# Patient Record
Sex: FEMALE | Race: WHITE | NOT HISPANIC OR LATINO | Employment: UNEMPLOYED | URBAN - METROPOLITAN AREA
[De-identification: names, ages, dates, MRNs, and addresses within clinical notes are randomized per-mention and may not be internally consistent; named-entity substitution may affect disease eponyms.]

---

## 2017-06-19 ENCOUNTER — ALLSCRIPTS OFFICE VISIT (OUTPATIENT)
Dept: OTHER | Facility: OTHER | Age: 27
End: 2017-06-19

## 2017-07-19 ENCOUNTER — ALLSCRIPTS OFFICE VISIT (OUTPATIENT)
Dept: OTHER | Facility: OTHER | Age: 27
End: 2017-07-19

## 2017-07-19 LAB
BILIRUB UR QL STRIP: NEGATIVE
CLARITY UR: NORMAL
COLOR UR: YELLOW
GLUCOSE (HISTORICAL): NORMAL
HGB UR QL STRIP.AUTO: NEGATIVE
KETONES UR STRIP-MCNC: NEGATIVE MG/DL
LEUKOCYTE ESTERASE UR QL STRIP: NEGATIVE
NITRITE UR QL STRIP: NEGATIVE
PH UR STRIP.AUTO: 7 [PH]
PROT UR STRIP-MCNC: NEGATIVE MG/DL
SP GR UR STRIP.AUTO: 1.01
UROBILINOGEN UR QL STRIP.AUTO: NORMAL

## 2018-01-11 NOTE — PROGRESS NOTES
Chief Complaint  Patient is here today to have a tetanus shot, L  Peoples/LPN      Active Problems    1  Adult body mass index 26 0-26 9 (V85 22) (Z68 26)   2  Allergic rhinitis (477 9) (J30 9)   3  Anxiety (300 00) (F41 9)   4  Back pain (724 5) (M54 9)   5  Chronic bilateral thoracic back pain (724 1,338 29) (M54 6,G89 29)   6  History of supraventricular tachycardia (V12 59) (Z86 79)   7  Need for diphtheria-tetanus-pertussis (Tdap) vaccine, adult/adolescent (V06 1) (Z23)   8  Palpitations (785 1) (R00 2)   9  Raynauds syndrome (443 0) (I73 00)    Current Meds   1  Meloxicam 15 MG Oral Tablet; take 1 tablet by mouth once daily; Therapy: 58DSK0517 to (Evaluate:23Nov2016); Last Rx:03Kaa5578 Ordered   2  Metoprolol Succinate ER 25 MG Oral Tablet Extended Release 24 Hour; TAKE 1 TABLET   ONCE DAILY; Therapy: 78YKC8008 to (Evaluate:22Jun2016); Last Rx:56Iva7327 Ordered   3  Oxycodone-Acetaminophen 5-325 MG Oral Tablet; TAKE 1 TABLET 4 times daily PRN for   severe pain; Therapy: 29PZP2264 to (Complete:31Jul2016); Last Rx:09Cje1961 Ordered   4  ValACYclovir HCl - 500 MG Oral Tablet; TAKE 1 TABLET TWICE DAILY; Therapy: 63ULM8301 to (Charanjit Abreu)  Requested for: 23Feb2016; Last   Rx:90Ese5781 Ordered   5  Vaniqa 13 9 % External Cream; use as directed; Therapy: 42KUQ2538 to (Evaluate:24Mar2016); Last Rx:86Hyh2907 Ordered    Allergies    1   PredniSONE TABS    Signatures   Electronically signed by : Mahad Carey MD; Jul 28 2016  4:57PM EST                       (Author)

## 2018-01-12 NOTE — RESULT NOTES
Message   PLEASE CALL   REVIEVED BW   BLOOD COUNT, LIVER FUNCTION, KIDNEY FUNCTION, THYROID CHECK WERE ALL NORMAL   CHOL WAS GOOD  [facility]        Verified Results  (1) COMPREHENSIVE METABOLIC PANEL 52CDW3933 00:50RQ [facility]     Test Name Result Flag Reference   GLUCOSE 92 mg/dL  65-99   Fasting reference interval   UREA NITROGEN (BUN) 12 mg/dL  7-25   CREATININE 0 69 mg/dL  0 50-1 10   eGFR NON-AFR  AMERICAN 123 mL/min/1 73m2  > OR = 60   eGFR AFRICAN AMERICAN 142 mL/min/1 73m2  > OR = 60   BUN/CREATININE RATIO   1-13   NOT APPLICABLE (calc)   SODIUM 139 mmol/L  135-146   POTASSIUM 3 8 mmol/L  3 5-5 3   CHLORIDE 106 mmol/L     CARBON DIOXIDE 21 mmol/L  19-30   CALCIUM 9 3 mg/dL  8 6-10 2   PROTEIN, TOTAL 7 1 g/dL  6 1-8 1   ALBUMIN 4 2 g/dL  3 6-5 1   GLOBULIN 2 9 g/dL (calc)  1 9-3 7   ALBUMIN/GLOBULIN RATIO 1 4 (calc)  1 0-2 5   BILIRUBIN, TOTAL 0 4 mg/dL  0 2-1 2   ALKALINE PHOSPHATASE 72 U/L     AST 14 U/L  10-30   ALT 9 U/L  6-29     (1) LIPID PANEL, FASTING 90Trf0470 12:00AM [facility]     Test Name Result Flag Reference   CHOLESTEROL, TOTAL 158 mg/dL  125-200   HDL CHOLESTEROL 43 mg/dL L > OR = 46   TRIGLICERIDES 441 mg/dL  <150   LDL-CHOLESTEROL 94 mg/dL (calc)  <130   Desirable range <100 mg/dL for patients with CHD or  diabetes and <70 mg/dL for diabetic patients with  known heart disease  CHOL/HDLC RATIO 3 7 (calc)  < OR = 5 0   NON HDL CHOLESTEROL 115 mg/dL (calc)     Target for non-HDL cholesterol is 30 mg/dL higher than   LDL cholesterol target       (Q) SED RATE BY MODIFIED Sagar Hays 67PHR2022 12:00AM [facility]     Test Name Result Flag Reference   SED RATE BY MODIFIEDJORDANA 6 mm/h  < OR = 20     (Q) CBC (H/H, RBC, INDICES, WBC, PLT) 39CGX1936 12:00AM [facility]     Test Name Result Flag Reference   WHITE BLOOD CELL COUNT 6 0 Thousand/uL  3 8-10 8   RED BLOOD CELL COUNT 5 19 Million/uL H 3 80-5 10   HEMOGLOBIN 13 3 g/dL  11 7-15 5   HEMATOCRIT 42 6 % 35 0-45 0   MCV 82 2 fL  80 0-100 0   MCH 25 7 pg L 27 0-33 0   MCHC 31 3 g/dL L 32 0-36 0   RDW 14 9 %  11 0-15 0   PLATELET COUNT 480 Thousand/uL  140-400   MPV 10 8 fL  7 5-11 5     *(Q) VITAMIN D, 25-HYDROXY, LC/MS/MS 76Sfo9922 12:00AM [facility]     Test Name Result Flag Reference   VITAMIN D, 25-OH, TOTAL 27 ng/mL L    Vitamin D Status         25-OH Vitamin D:     Deficiency:                    <20 ng/mL  Insufficiency:             20 - 29 ng/mL  Optimal:                 > or = 30 ng/mL     For 25-OH Vitamin D testing on patients on   D2-supplementation and patients for whom quantitation   of D2 and D3 fractions is required, the QuestAssureD(TM)  25-OH VIT D, (D2,D3), LC/MS/MS is recommended: order   code 71928 (patients >2yrs)  For more information on this test, go to:  http://[employer]/faq/YQS194  (This link is being provided for   informational/educational purposes only )     (1) C-REACTIVE PROTEIN 62Kjr8624 12:00AM [email or web address]     Test Name Result Flag Reference   C-REACTIVE PROTEIN <0 10 mg/dL  <0 80   Please be advised that patients taking Carboxypenicillins  may exhibit falsely decreased C-Reactive Protein levels  due to an analytical interference in this assay  (Q) TSH, 3RD GENERATION 94Fyi7274 12:00AM [employer]     Test Name Result Flag Reference   TSH 1 11 mIU/L     Reference Range                         > or = 20 Years  0 40-4 50                              Pregnancy Ranges            First trimester    0 26-2 66            Second trimester   0 55-2 73            Third trimester    0 43-2 91     (1) KIMMY SCREEN, (INC  PATTERN IF INDICATED) 22DXJ1750 12:00AM [email or web address]     Test Name Result Flag Reference   ANACHOICE(R) SCREEN NEGATIVE  NEGATIVE   A negative ANAchoice(R) indicates the absence of  detectable antibodies to component analytes  consisting of dsDNA, Chromatin, RNP, Sm/RNP, Sm, SSA,   SSB, Ly-1, Centromere B, Scl-70 and Ribosomal P       A negative ANAchoice(R) should be interpreted in the  context of the clinical and laboratory findings, and  does not rule out autoimmune disease characterized by   other autoantibody specificities including autoimmune   hepatitis and primary biliary cirrhosis

## 2018-01-13 NOTE — PROGRESS NOTES
Assessment   1  Encounter for preventive health examination (V70 0) (Z00 00)  2  Allergic rhinitis (477 9) (J30 9)  3  Raynauds syndrome (443 0) (I73 00)  4  Anxiety (300 00) (F41 9)  5  Palpitations (785 1) (R00 2)  6  History of supraventricular tachycardia (V12 59) (Z86 79)  7  Adult body mass index 26 0-26 9 (V85 22) (Z68 26)1      1 Amended By: Kay Torres; Feb 23 2016 1:26 PM EST    Plan  Health Maintenance    · (1) CBC/ PLT (NO DIFF); Status:Active; Requested for:97Yxd6303;    · (1) COMPREHENSIVE METABOLIC PANEL; Status:Active; Requested for:51Sjw7099;    · (1) LIPID PANEL, FASTING; Status:Active; Requested for:00Pgq8160;    · (1) TSH; Status:Active; Requested for:32Evr7243;    · (1) VITAMIN D 25-HYDROXY; Status:Active; Requested for:86Owb0156;    · EKG/ECG- POC; Status:Active; Requested for:03Kjp5773;    · Urine Dip Automated- POC; Status:Resulted - Requires Verification;   Done: 00VON7284  08:44AM   · Follow-up visit in 1 year Evaluation and Treatment  Follow-up  Status: Hold For -  Scheduling  Requested for: 22Feb2016   · Always use a seat belt and shoulder strap when riding or driving a motor vehicle ;  Status:Complete;   Done: 48RTA8329   · Begin a limited exercise program ; Status:Complete;   Done: 68ZBT2235   · Drink plenty of fluids ; Status:Complete;   Done: 59TDV5435   · Eat a low fat and low cholesterol diet ; Status:Complete;   Done: 13QIE3653   · Use a sun block product with an SPF of 15 or more ; Status:Complete;   Done:  54AKX8183   · We encourage all of our patients to exercise regularly    30 minutes of exercise or physical  activity five or more days a week is recommended for children and adults ;  Status:Complete;   Done: 18QCP6748   · We recommend routine visits to a dentist ; Status:Complete;   Done: 27DWH0357   · We recommend that you bring your body mass index down to 26 ; Status:Complete;    Done: 77AJI9787  History of supraventricular tachycardia, Palpitations    · Start: Metoprolol Succinate ER 25 MG Oral Tablet Extended Release 24 Hour; TAKE 1  TABLET ONCE DAILY   · Cardiology Referral Other Physician Referral  Consult  Status: Hold For - Scheduling   Requested for: 02EQG0370  YOU are Referring to a non- Preferred Provider : Established Patient  (MU) Care Summary provided  : Yes  PMH: History of herpes labialis    · Renew: ValACYclovir HCl - 500 MG Oral Tablet; TAKE 1 TABLET TWICE DAILY2   Raynauds syndrome    · (1) KIMMY SCREEN, (INC  PATTERN IF INDICATED); Status:Active; Requested  for:49Znz6453; 1    · (1) C-REACTIVE PROTEIN; Status:Active; Requested for:45Tyv1362; 1    · (1) SED RATE; Status:Active; Requested for:59Ouy7402; 1      1 Amended By: Rach Connolly; Feb 23 2016 9:08 AM EST   2 Amended By: Rach Connolly; Feb 23 2016 9:08 AM EST    Discussion/Summary  health maintenance visit Currently, she eats a healthy diet  cervical cancer screening is current Breast cancer screening: self breast exam technique was taught  Colorectal cancer screening: colorectal cancer screening is not indicated  Osteoporosis screening: bone mineral density testing is not indicated  Screening lab work includes hemoglobin, glucose, lipid profile, thyroid function testing, 25-hydroxyvitamin D and urinalysis  The immunizations are up to date  Advice and education were given regarding sunscreen use and seat belt use  Patient discussion: discussed with the patient  DISCUSSED HEALTH MAINTENANCE ISSUES  BW WILL BE OBTAINED  CARDIOLOGY CONSULT ORDERED  CONSIDER TONIC WATER WITH QUININE - 4 OZ BEFORE BED FOR RESTLESS LEGS  REVISIT IN 1 YR FOR ANNUAL EXAM       Chief Complaint  Patient is here today for a complete physical exam; no PAP  lb/lpn      History of Present Illness  HM, Adult Female: The patient is being seen for a health maintenance evaluation  General Health: The patient's health since the last visit is described as good  She has regular dental visits  She denies vision problems   She denies hearing loss  Immunizations status: up to date  Lifestyle:  She consumes a diverse and healthy diet  She does not have any weight concerns  She does not exercise regularly  She does not use tobacco  She denies alcohol use  Screening: cancer screening reviewed and current  metabolic screening reviewed and current  risk screening reviewed and current  HPI: [address] [place] [address] Rd RECORD  NO MAJOR CONCERNS  RESTLESS LEGS  PALPITATIONS      Review of Systems    Constitutional: no fever, no chills and not feeling tired  Eyes: no eyesight problems  ENT: no sore throat and no nasal discharge  Cardiovascular: palpitations, but no chest pain, the heart rate was not fast and no lower extremity edema  Respiratory: no shortness of breath, no cough, no wheezing and no shortness of breath during exertion  Gastrointestinal: no abdominal pain, no nausea, no vomiting, no diarrhea and no blood in stools  Genitourinary: no dysuria, no incontinence and no unexplained vaginal bleeding  Musculoskeletal: no arthralgias, no joint swelling, no myalgias and no joint stiffness  Integumentary: no rashes  Neurological: no headache, no numbness, no tingling and no dizziness  Psychiatric: no anxiety and no depression  Endocrine: no muscle weakness  Hematologic/Lymphatic: no swollen glands in the neck  Active Problems   1  Allergic rhinitis (477 9) (J30 9)  2   Anxiety (300 00) (F41 9)  3  Raynauds syndrome (443 0) (I73 00)    Past Medical History    · History of Abdominal pain (789 00) (R10 9)   · History of Abscess or cellulitis of face (682 0) (L03 211,L02 01)   · History of Backache (724 5) (M54 9)   · History of Bunion of great toe of right foot (727 1) (M20 11)   · History of Contact dermatitis due to plants, except food (692 6) (L25 5)   · History of Disorder of female genital organs (629 9) (N94 9)   · History of Flatulence, eructation and gas pain (787 3) (R14 3,R14 1,R14 2) · History of acne (V13 3) (Z87 2)   · History of atopic dermatitis (V13 3) (Z87 2)   · History of chronic sinusitis (V12 69) (Z87 09)   · History of constipation (V12 79) (Z87 19)   · History of fatigue (V13 89) (N34 278)   · History of headache (V13 89) (T55 259)   · History of herpes labialis (V12 09) (Z86 19)   · History of pregnancy (V13 29)   · History of urinary incontinence (V13 09) (T74 736)   · History of Kidney stone (592 0) (N20 0)   · History of Lower leg pain (729 5) (M79 669)   · History of Myalgia and myositis (729 1) (M79 1,M60 9)   · History of Other ovarian cyst (620 2) (N83 29)   · History of Otitis externa (380 10) (H60 90)   · History of Peptic ulcer (533 90) (K27 9)   · History of PPD screening test (V74 1) (Z11 1)   · History of Pyuria (791 9) (N39 0)   · History of Restless legs (333 94) (G25 81)   · History of Seen in hospital outpatient department   · History of Thoracic back pain (724 1) (M54 6)   · History of Weight gain (783 1) (R63 5)    Surgical History    · History of  Section   · History of Dental Surgery   · History of Sinus Surgery    Family History    · Family history of hyperlipidemia (V18 19) (Z83 49)   · Family history of hypertension (V17 49) (Z82 49)    Social History    · Caffeine use (V4 89) (F15 90)   · Cultural background   · NON-   · Never a smoker   · Occasional alcohol use   · Patient ingests cola containing caffeine (V49 89) (Z78 9)   · Primary spoken language English   · Tea    Current Meds  1  1   2  Metoprolol Succinate ER 25 MG Oral Tablet Extended Release 24 Hour; TAKE 1 TABLET   ONCE DAILY; Therapy: 10ISF0222 to (Evaluate:2016); Last Rx:68Trw4646 Ordered1   3  ValACYclovir HCl - 500 MG Oral Tablet; TAKE 1 TABLET TWICE DAILY;    Therapy: 64TCR3713 to ([facility])  Requested for:1  84VXW1332;2  Last Rx:10Djh1759 Ordered1      1 Amended By: Prisca Sanders; 2016 9:08 AM EST   2 Amended By: Prisca Sanders; 2016 9:09 AM EST    Allergies   1  PredniSONE TABS    Vitals   Recorded: 84Jex3067 08:23AM   Temperature 97 2 F, Tympanic    Heart Rate 76, L Radial    Pulse Quality Normal, L Radial    Respiration 16    Respiration Quality Normal    Systolic 568, LUE, Sitting    Diastolic 82, LUE, Sitting    Height 5 ft 4 5 in    Weight 159 lb     BMI Calculated 26 87    BSA Calculated 1 78    Patient Refused Height No No   Patient Refused Weight No No   LMP 61ERI9615      Physical Exam    Constitutional   General appearance: No acute distress, well appearing and well nourished  Head and Face   Head and face: Normal     Palpation of the face and sinuses: No sinus tenderness  Eyes   Conjunctiva and lids: No swelling, erythema or discharge  Pupils and irises: Equal, round, reactive to light  Ophthalmoscopic examination: Normal fundi and optic discs  Ears, Nose, Mouth, and Throat   External inspection of ears and nose: Normal     Otoscopic examination: Tympanic membranes translucent with normal light reflex  Canals patent without erythema  Nasal mucosa, septum, and turbinates: Normal without edema or erythema  Lips, teeth, and gums: Normal, good dentition  Oropharynx: Normal with no erythema, edema, exudate or lesions  Neck   Neck: Supple, symmetric, trachea midline, no masses  Thyroid: Normal, no thyromegaly  Pulmonary   Respiratory effort: No increased work of breathing or signs of respiratory distress  Auscultation of lungs: Clear to auscultation  Cardiovascular   Auscultation of heart: Normal rate and rhythm, normal S1 and S2, no murmurs  Carotid pulses: 2+ bilaterally  Abdominal aorta: Normal     Femoral pulses: 2+ bilaterally  Pedal pulses: 2+ bilaterally  Peripheral vascular exam: Normal     Examination of extremities for edema and/or varicosities: Normal     Chest   Palpation of breasts and axillae: Normal, no masses palpated  Abdomen   Abdomen: Non-tender, no masses      Liver and spleen: No hepatomegaly or splenomegaly  Examination for hernias: No hernia appreciated  Lymphatic   Palpation of lymph nodes in neck: No lymphadenopathy  Palpation of lymph nodes in axillae: No lymphadenopathy  Musculoskeletal   Gait and station: Normal     Digits and nails: Normal without clubbing or cyanosis  Joints, bones, and muscles: Normal     Range of motion: Normal     Stability: Normal     Muscle strength/tone: Normal     Skin   Skin and subcutaneous tissue: Normal without rashes or lesions  Palpation of skin and subcutaneous tissue: Normal turgor  Neurologic   Cranial nerves: Cranial nerves II-XII intact  Cortical function: Normal mental status  Reflexes: 2+ and symmetric  Sensation: No sensory loss  Coordination: Normal finger to nose and heel to shin      Psychiatric   Judgment and insight: Normal     Orientation to person, place, and time: Normal     Mood and affect: Normal        Results/Data  Urine Dip Automated- POC 82LLO5075 08:44AM Yuri George     Test Name Result Flag Reference   Color Yellow     Clarity Transparent     Leukocytes Negative     Nitrite Negative     Blood Negative     Bilirubin Negative     Urobilinogen Normal     Protein Negative     Ph 5 0     Specific Gravity 1 030     Ketone Negative     Glucose Normal       PHQ-9 Adult Depression Screening 31Sse1490 08:21AM User, [facility]     Test Name Result Flag Reference   PHQ-9 Adult Depression Score 5     Q1: 0, Q2: 1, Q3: 0, Q4: 3, Q5: 0, Q6: 1, Q7: 0, Q8: 0, Q9: 0   PHQ-9 Adult Depression Screening Negative     PHQ-9 Difficulty Level Somewhat difficult     PHQ-9 Severity Mild Depression       Falls Risk Assessment (Dx V80 09 Screen for Neurologic Disorder) 33ETG2700 08:19AM User, [facility]     Test Name Result Flag Reference   Falls Risk      No falls in the past year       Signatures   Electronically signed by : Melissa Davis MD; Feb 23 2016  1:26PM EST                       (Author)

## 2018-01-14 VITALS
HEIGHT: 65 IN | DIASTOLIC BLOOD PRESSURE: 70 MMHG | OXYGEN SATURATION: 99 % | WEIGHT: 170 LBS | SYSTOLIC BLOOD PRESSURE: 110 MMHG | BODY MASS INDEX: 28.32 KG/M2 | TEMPERATURE: 98.3 F | RESPIRATION RATE: 16 BRPM | HEART RATE: 94 BPM

## 2018-01-14 VITALS
OXYGEN SATURATION: 99 % | TEMPERATURE: 98.4 F | HEART RATE: 84 BPM | SYSTOLIC BLOOD PRESSURE: 118 MMHG | HEIGHT: 65 IN | RESPIRATION RATE: 16 BRPM | DIASTOLIC BLOOD PRESSURE: 70 MMHG | BODY MASS INDEX: 27.99 KG/M2 | WEIGHT: 168 LBS

## 2018-01-17 NOTE — RESULT NOTES
Message   PLEASE CALL   BACK X RAY WAS OK   SOME MILD SCOLIOSIS     Verified Results  XR SPINE THORACIC 2 VIEW 13Wte5827 11:01AM Jaz [place] Order Number: [id number]     Test Name Result Flag Reference   XR SPINE THORACIC 2 VW (Report)     THORACIC SPINE     INDICATION: Back pain  COMPARISON: None     VIEWS: AP and lateral projections; 3 images     FINDINGS:     Thoracic vertebrae demonstrate normal stature  A mild scoliosis of the thoracic spine is present convexity to the right  There is no fracture or pathologic bone lesion  There is no displacement of the paraspinal line  The pedicles are intact  IMPRESSION:     Mild scoliosis of the thoracic spine convexity to the right         Workstation performed: [id number]     Signed by:   Karthik Devine MD   7/28/16

## 2018-01-18 NOTE — PROGRESS NOTES
Assessment    1  Encounter for preventive health examination (V70 0) (Z00 00)   2  Raynauds syndrome (443 0) (I73 00)   3  Chronic bilateral thoracic back pain (724 1,338 29) (M54 6,G89 29)   4  Allergic rhinitis (477 9) (J30 9)   5  History of supraventricular tachycardia (V12 59) (Z86 79)    Plan  Allergic rhinitis, Chronic bilateral thoracic back pain, Health Maintenance, History of  supraventricular tachycardia, Raynauds syndrome    · Urine Dip Automated- POC; Status:Active - Perform Order; Requested for:[id number]; Health Maintenance    · Follow-up visit in 1 year Evaluation and Treatment  Follow-up  Status: Hold For -  Scheduling  Requested for: 76SQN1815   · Always use a seat belt and shoulder strap when riding or driving a motor vehicle ;  Status:Complete;   Done: 44CNF3113   · Begin a limited exercise program ; Status:Complete;   Done: 73KXU4573   · Drink plenty of fluids ; Status:Complete;   Done: 77NRA9381   · Eat a low fat and low cholesterol diet ; Status:Complete;   Done: 28PSY9794   · Eat a normal well-balanced diet ; Status:Complete;   Done: 65FYF9290   · Use a sun block product with an SPF of 15 or more ; Status:Complete;   Done: 12FQJ5387   · We encourage all of our patients to exercise regularly  30 minutes of exercise or physical  activity five or more days a week is recommended for children and adults ;  Status:Complete;   Done: 79GQD7828   · We recommend routine visits to a dentist ; Status:Complete;   Done: 98SHM4243   · We recommend that you bring your body mass index down to 26 ; Status:Complete;    Done: 68THS1984    Discussion/Summary  health maintenance visit Currently, she eats a healthy diet  cervical cancer screening is current Breast cancer screening: self breast exam technique was taught  Colorectal cancer screening: colorectal cancer screening is not indicated  Osteoporosis screening: bone mineral density testing is not indicated  The immunizations are up to date   Advice and education were given regarding sunscreen use and seat belt use  Patient discussion: discussed with the patient  DISCUSSED HEALTH MAINTENANCE ISSUES  BW WILL BE OBTAINED AFTER PREGNANCY  REVISIT IN 1 YR FOR ANNUAL EXAM    The treatment plan was reviewed with the patient/guardian  The patient/guardian understands and agrees with the treatment plan      Chief Complaint  Patient is here today for her annual physical exam, L  Peoples/VOLODYMYR      History of Present Illness  HM, Adult Female: The patient is being seen for a health maintenance evaluation  General Health: The patient's health since the last visit is described as good  She has regular dental visits  She denies vision problems  She denies hearing loss  Immunizations status: up to date  Lifestyle:  She consumes a diverse and healthy diet  She does not have any weight concerns  She does not exercise regularly  She does not use tobacco  She denies alcohol use  Screening: cancer screening reviewed and current  metabolic screening reviewed and current  risk screening reviewed and current  HPI: [address] [place] [address] Rd RECORD  NO MAJOR CONCERNS       Review of Systems    Constitutional: no fever, no chills and not feeling tired  Eyes: no eyesight problems  ENT: no sore throat and no nasal discharge  Cardiovascular: no chest pain, the heart rate was not fast, no palpitations and no lower extremity edema  Respiratory: no shortness of breath, no cough, no wheezing and no shortness of breath during exertion  Gastrointestinal: no abdominal pain, no nausea, no vomiting, no diarrhea and no blood in stools  Genitourinary: no dysuria, no incontinence and no unexplained vaginal bleeding  Musculoskeletal: no arthralgias, no joint swelling, no myalgias and no joint stiffness  Integumentary: no rashes  Neurological: no headache, no numbness, no tingling and no dizziness  Psychiatric: no anxiety and no depression     Endocrine: no muscle weakness  Hematologic/Lymphatic: no swollen glands in the neck  ROS reviewed  Active Problems    1  Adult body mass index 26 0-26 9 (V85 22) (Z68 26)   2  Allergic rhinitis (477 9) (J30 9)   3  Anxiety (300 00) (F41 9)   4  Chronic bilateral thoracic back pain (724 1,338 29) (M54 6,G89 29)   5  History of supraventricular tachycardia (V12 59) (Z86 79)   6  Raynauds syndrome (443 0) (I73 00)    Past Medical History    · History of Bunion of great toe of right foot (727 1) (M21 611)   · History of atopic dermatitis (V13 3) (Z87 2)   · History of chronic sinusitis (V12 69) (Z87 09)   · History of herpes labialis (V12 09) (Z86 19)   · History of pregnancy (V13 29)   · History of Kidney stone (592 0) (N20 0)   · History of Other ovarian cyst (620 2) (N83 299)   · History of Peptic ulcer (533 90) (K27 9)   · History of Thoracic back pain (724 1) (M54 6)    Surgical History    · History of  Section   · History of Dental Surgery   · History of Sinus Surgery    Family History  Father    · Family history of hyperlipidemia (V18 19) (Z83 49)   · Family history of hypertension (V17 49) (Z82 49)  Family History    · Denied: FH: mental illness    Social History    · Caffeine use (V49 89) (F15 90)   · Cultural background   · NON-   · Dental care, regularly   · Never a smoker   · Occasional alcohol use   · Patient ingests cola containing caffeine (V49 89) (Z78 9)   · Primary spoken language English   · Tea    Current Meds   1  No Reported Medications Recorded    Allergies    1  PredniSONE TABS   2   Vicodin TABS    Vitals   Recorded: 88QYB3416 01:15PM   Temperature 98 3 F, Temporal   Heart Rate 94, R Radial   Pulse Quality Normal, R Radial   Respiration Quality Normal   Respiration 16   Systolic 060, LUE, Sitting   Diastolic 70, LUE, Sitting   Height 5 ft 5 in   Weight 170 lb    BMI Calculated 28 29   BSA Calculated 1 85   O2 Saturation 99     Physical Exam    Constitutional   General appearance: No acute distress, well appearing and well nourished  Head and Face   Head and face: Normal     Eyes   Conjunctiva and lids: No swelling, erythema or discharge  Pupils and irises: Equal, round, reactive to light  Ophthalmoscopic examination: Normal fundi and optic discs  Ears, Nose, Mouth, and Throat   External inspection of ears and nose: Normal     Otoscopic examination: Tympanic membranes translucent with normal light reflex  Canals patent without erythema  Nasal mucosa, septum, and turbinates: Normal without edema or erythema  Lips, teeth, and gums: Normal, good dentition  Oropharynx: Normal with no erythema, edema, exudate or lesions  Neck   Neck: Supple, symmetric, trachea midline, no masses  Thyroid: Normal, no thyromegaly  Pulmonary   Respiratory effort: No increased work of breathing or signs of respiratory distress  Auscultation of lungs: Clear to auscultation  Cardiovascular   Auscultation of heart: Normal rate and rhythm, normal S1 and S2, no murmurs  Carotid pulses: 2+ bilaterally  Abdominal aorta: Normal     Femoral pulses: 2+ bilaterally  Pedal pulses: 2+ bilaterally  Peripheral vascular exam: Normal     Examination of extremities for edema and/or varicosities: Normal     Abdomen   Abdomen: Non-tender, no masses  IUP AT 28 WEEKS  Liver and spleen: No hepatomegaly or splenomegaly  Examination for hernias: No hernia appreciated  Lymphatic   Palpation of lymph nodes in neck: No lymphadenopathy  Musculoskeletal   Gait and station: Normal     Digits and nails: Normal without clubbing or cyanosis  Joints, bones, and muscles: Normal     Range of motion: Normal     Stability: Normal     Muscle strength/tone: Normal     Skin   Skin and subcutaneous tissue: Normal without rashes or lesions  Palpation of skin and subcutaneous tissue: Normal turgor  Neurologic   Cranial nerves: Cranial nerves II-XII intact      Cortical function: Normal mental status  Reflexes: 2+ and symmetric  Sensation: No sensory loss  Coordination: Normal finger to nose and heel to shin      Psychiatric   Judgment and insight: Normal     Orientation to person, place, and time: Normal     Mood and affect: Normal        Signatures   Electronically signed by : Madonna Ignacio MD; Jul 19 2017  1:43PM EST                       (Author)

## 2018-02-02 ENCOUNTER — OFFICE VISIT (OUTPATIENT)
Dept: FAMILY MEDICINE CLINIC | Facility: CLINIC | Age: 28
End: 2018-02-02
Payer: COMMERCIAL

## 2018-02-02 VITALS
TEMPERATURE: 99.6 F | OXYGEN SATURATION: 99 % | BODY MASS INDEX: 24.92 KG/M2 | WEIGHT: 146 LBS | HEART RATE: 72 BPM | DIASTOLIC BLOOD PRESSURE: 70 MMHG | HEIGHT: 64 IN | SYSTOLIC BLOOD PRESSURE: 122 MMHG | RESPIRATION RATE: 16 BRPM

## 2018-02-02 DIAGNOSIS — G89.29 CHRONIC BILATERAL THORACIC BACK PAIN: Primary | ICD-10-CM

## 2018-02-02 DIAGNOSIS — M54.6 CHRONIC BILATERAL THORACIC BACK PAIN: Primary | ICD-10-CM

## 2018-02-02 DIAGNOSIS — F41.9 ANXIETY: ICD-10-CM

## 2018-02-02 DIAGNOSIS — L98.9 FACIAL SKIN LESION: ICD-10-CM

## 2018-02-02 PROCEDURE — 99214 OFFICE O/P EST MOD 30 MIN: CPT | Performed by: FAMILY MEDICINE

## 2018-02-02 RX ORDER — OXYCODONE HYDROCHLORIDE AND ACETAMINOPHEN 5; 325 MG/1; MG/1
1 TABLET ORAL EVERY 4 HOURS PRN
COMMUNITY
End: 2018-05-21

## 2018-02-02 RX ORDER — METAXALONE 800 MG/1
800 TABLET ORAL 3 TIMES DAILY
Qty: 90 TABLET | Refills: 1 | Status: SHIPPED | OUTPATIENT
Start: 2018-02-02 | End: 2019-07-01 | Stop reason: ALTCHOICE

## 2018-02-02 NOTE — PATIENT INSTRUCTIONS
REST  WARM SHOWERS  BACK STRETCHING EXERCISES  TRIAL OF SKELAXIN  CALL IN 1 MONTH WITH UPDATE, SOONER PRN    RV IF SHE WOULD LIKE SKIN LESION REMOVED    MAY NEED MRI AND ORTHO CONSULT FOR FURTHER EVAL

## 2018-02-02 NOTE — ASSESSMENT & PLAN NOTE
SMALL AREA ON HER UPPER LIP  PRESENT FOR MONTHS  NOT IMPROVING  DENIES ANY PAIN OR ITCH  NO BLEEDING

## 2018-02-02 NOTE — ASSESSMENT & PLAN NOTE
HAS BEEN ANXIOUS LATELY  NO REAL STRESSORS  SLEEPING OK EXCEPT FOR THE PAST SEVERAL NIGHTS  DISCUSSED OPTIONS FOR CARE

## 2018-02-02 NOTE — PROGRESS NOTES
Assessment/Plan:    Anxiety  HAS BEEN ANXIOUS LATELY  NO REAL STRESSORS  SLEEPING OK EXCEPT FOR THE PAST SEVERAL NIGHTS  DISCUSSED OPTIONS FOR CARE    Chronic bilateral thoracic back pain  BACK CONTINUES TO GIVE ISSUES  SEEMS TO FLARE UP  UPPER BACK  RADIATES TO THE FRONT  DENIES ANY NUMBNESS OR TINGLING  OCC FEELS NAUSEOUS    REVIEWED PREVIOUS X RAY RESULTS - MILD SCOLIOSIS  NO RECENT OR PREV TRAUMA  NO CHANGE IN STOOLS  DENIES ANY URINARY FREQ OR BURNING    Facial skin lesion  SMALL AREA ON HER UPPER LIP  PRESENT FOR MONTHS  NOT IMPROVING  DENIES ANY PAIN OR ITCH  NO BLEEDING       Diagnoses and all orders for this visit:    Chronic bilateral thoracic back pain  -     metaxalone (SKELAXIN) 800 mg tablet; Take 1 tablet (800 mg total) by mouth 3 (three) times a day    Anxiety    Facial skin lesion    Other orders  -     oxyCODONE-acetaminophen (PERCOCET) 5-325 mg per tablet; Take 1 tablet by mouth every 4 (four) hours as needed for moderate pain          Subjective:      Patient ID: Kelly Alex is a 24 y o  female  HPI    The following portions of the patient's history were reviewed and updated as appropriate: allergies, current medications, past family history, past medical history, past social history, past surgical history and problem list     Review of Systems   Constitutional: Negative for chills, fatigue and fever  HENT: Negative for congestion, ear discharge, ear pain, mouth sores, postnasal drip, sore throat and trouble swallowing  Eyes: Negative for pain, discharge and visual disturbance  Respiratory: Negative for cough, shortness of breath and wheezing  Cardiovascular: Negative for chest pain, palpitations and leg swelling  Gastrointestinal: Positive for nausea  Negative for abdominal distention, abdominal pain, blood in stool, diarrhea and vomiting  Endocrine: Negative for polydipsia, polyphagia and polyuria  Genitourinary: Negative for dysuria, frequency, hematuria and urgency  Musculoskeletal: Positive for arthralgias, back pain and neck stiffness  Negative for gait problem and joint swelling  Skin: Negative for pallor and rash  SKIN LESION ON UPPER LIP   Neurological: Negative for dizziness, syncope, speech difficulty, weakness, light-headedness, numbness and headaches  Hematological: Negative for adenopathy  Psychiatric/Behavioral: Negative for behavioral problems, confusion and sleep disturbance  The patient is not nervous/anxious  Objective:     Physical Exam   Constitutional: She is oriented to person, place, and time  She appears well-developed and well-nourished  HENT:   Head: Normocephalic and atraumatic  Right Ear: External ear normal    Left Ear: External ear normal    Nose: Nose normal    Mouth/Throat: Oropharynx is clear and moist    Eyes: Conjunctivae and EOM are normal  Pupils are equal, round, and reactive to light  Right eye exhibits no discharge  Left eye exhibits no discharge  Neck: Normal range of motion  Neck supple  No thyromegaly present  Cardiovascular: Normal rate, regular rhythm, normal heart sounds and intact distal pulses  No murmur heard  Pulmonary/Chest: Effort normal and breath sounds normal  She has no wheezes  She has no rales  Abdominal: Soft  Bowel sounds are normal  She exhibits no distension and no mass  There is no tenderness  There is no rebound and no guarding  Genitourinary: No breast swelling, tenderness or discharge  Musculoskeletal: Normal range of motion  She exhibits tenderness  She exhibits no edema or deformity  MILD TENDERNESS UPPER THORACIC SPINE, MINIMAL PARAVERTEBRAL TENDERNESS   Lymphadenopathy:     She has no cervical adenopathy  Neurological: She is alert and oriented to person, place, and time  No cranial nerve deficit  She exhibits normal muscle tone  Coordination normal    Skin: Skin is warm and dry  No rash noted  No erythema     SMALL PAPULAR LESION UPPER R LIP  APPROX 2 MM IN DIAMETER  NO SCALE OR BLEEDING   Psychiatric: She has a normal mood and affect  Her behavior is normal  Judgment and thought content normal    Vitals reviewed

## 2018-02-02 NOTE — ASSESSMENT & PLAN NOTE
BACK CONTINUES TO GIVE ISSUES  SEEMS TO FLARE UP  UPPER BACK  RADIATES TO THE FRONT  DENIES ANY NUMBNESS OR TINGLING  OCC FEELS NAUSEOUS    REVIEWED PREVIOUS X RAY RESULTS - MILD SCOLIOSIS  NO RECENT OR PREV TRAUMA  NO CHANGE IN STOOLS  DENIES ANY URINARY FREQ OR BURNING

## 2018-02-13 ENCOUNTER — TELEPHONE (OUTPATIENT)
Dept: FAMILY MEDICINE CLINIC | Facility: CLINIC | Age: 28
End: 2018-02-13

## 2018-03-12 ENCOUNTER — OFFICE VISIT (OUTPATIENT)
Dept: FAMILY MEDICINE CLINIC | Facility: CLINIC | Age: 28
End: 2018-03-12
Payer: COMMERCIAL

## 2018-03-12 VITALS
WEIGHT: 149 LBS | OXYGEN SATURATION: 99 % | HEART RATE: 64 BPM | DIASTOLIC BLOOD PRESSURE: 68 MMHG | RESPIRATION RATE: 16 BRPM | HEIGHT: 65 IN | SYSTOLIC BLOOD PRESSURE: 104 MMHG | BODY MASS INDEX: 24.83 KG/M2 | TEMPERATURE: 99 F

## 2018-03-12 DIAGNOSIS — M54.6 CHRONIC BILATERAL THORACIC BACK PAIN: Primary | ICD-10-CM

## 2018-03-12 DIAGNOSIS — F41.9 ANXIETY: ICD-10-CM

## 2018-03-12 DIAGNOSIS — L98.9 FACIAL SKIN LESION: ICD-10-CM

## 2018-03-12 DIAGNOSIS — G89.29 CHRONIC BILATERAL THORACIC BACK PAIN: Primary | ICD-10-CM

## 2018-03-12 LAB
SL AMB  POCT GLUCOSE, UA: NORMAL
SL AMB LEUKOCYTE ESTERASE,UA: 0
SL AMB POCT BILIRUBIN,UA: 0
SL AMB POCT BLOOD,UA: 250
SL AMB POCT CLARITY,UA: CLEAR
SL AMB POCT COLOR,UA: YELLOW
SL AMB POCT KETONES,UA: 0
SL AMB POCT NITRITE,UA: 0
SL AMB POCT PH,UA: 6.5
SL AMB POCT SPECIFIC GRAVITY,UA: 1.03
SL AMB POCT URINE PROTEIN: 0
SL AMB POCT UROBILINOGEN: NORMAL

## 2018-03-12 PROCEDURE — 99213 OFFICE O/P EST LOW 20 MIN: CPT | Performed by: FAMILY MEDICINE

## 2018-03-12 PROCEDURE — 87086 URINE CULTURE/COLONY COUNT: CPT | Performed by: FAMILY MEDICINE

## 2018-03-12 PROCEDURE — 81003 URINALYSIS AUTO W/O SCOPE: CPT | Performed by: FAMILY MEDICINE

## 2018-03-12 NOTE — PROGRESS NOTES
Assessment/Plan:    Facial skin lesion  NO REAL CHANGES  STABLE    Anxiety  IMPROVED      Chronic bilateral thoracic back pain  BACK PAIN IS IMPROVED  SKELAXIN SEEMS TO BE HELPING WELL    ENCOURAGE PATIENT TO CONTINUE BACK EXERCISES  PATIENT HAPPY WITH PROGRESS AT THIS TIME       Diagnoses and all orders for this visit:    Chronic bilateral thoracic back pain    Facial skin lesion    Anxiety          Subjective:      Patient ID: Randi Ahuja is a 24 y o  female      HPI    The following portions of the patient's history were reviewed and updated as appropriate: allergies, current medications, past family history, past medical history, past social history, past surgical history and problem list     Review of Systems      Objective:      /68 (BP Location: Left arm, Patient Position: Sitting, Cuff Size: Standard)   Pulse 64   Temp 99 °F (37 2 °C) (Tympanic)   Resp 16   Ht 5' 5" (1 651 m)   Wt 67 6 kg (149 lb)   SpO2 99%   BMI 24 79 kg/m²          Physical Exam      NO PE WAS PREFORMED  LENGTH OF VISIT - 20 MIN  LENGTH OF  - 20 MIN

## 2018-03-12 NOTE — ASSESSMENT & PLAN NOTE
BACK PAIN IS IMPROVED  SKELAXIN SEEMS TO BE HELPING WELL    ENCOURAGE PATIENT TO CONTINUE BACK EXERCISES  PATIENT HAPPY WITH PROGRESS AT THIS TIME

## 2018-03-14 ENCOUNTER — TELEPHONE (OUTPATIENT)
Dept: FAMILY MEDICINE CLINIC | Facility: CLINIC | Age: 28
End: 2018-03-14

## 2018-03-14 NOTE — TELEPHONE ENCOUNTER
Please call Mercedes back from [facility] re the order that was placed for Skippy Clamp      [phone]  Thanks

## 2018-03-15 LAB — BACTERIA UR CULT: NORMAL

## 2018-05-21 ENCOUNTER — OFFICE VISIT (OUTPATIENT)
Dept: FAMILY MEDICINE CLINIC | Facility: CLINIC | Age: 28
End: 2018-05-21
Payer: COMMERCIAL

## 2018-05-21 VITALS
RESPIRATION RATE: 16 BRPM | SYSTOLIC BLOOD PRESSURE: 108 MMHG | HEART RATE: 78 BPM | HEIGHT: 65 IN | TEMPERATURE: 98.7 F | WEIGHT: 147 LBS | DIASTOLIC BLOOD PRESSURE: 70 MMHG | OXYGEN SATURATION: 99 % | BODY MASS INDEX: 24.49 KG/M2

## 2018-05-21 DIAGNOSIS — F41.9 ANXIETY: ICD-10-CM

## 2018-05-21 DIAGNOSIS — R51.9 ACUTE NONINTRACTABLE HEADACHE, UNSPECIFIED HEADACHE TYPE: Primary | ICD-10-CM

## 2018-05-21 PROCEDURE — 99213 OFFICE O/P EST LOW 20 MIN: CPT | Performed by: FAMILY MEDICINE

## 2018-05-21 RX ORDER — NAPROXEN 500 MG/1
500 TABLET ORAL 2 TIMES DAILY WITH MEALS
Qty: 42 TABLET | Refills: 1 | Status: SHIPPED | OUTPATIENT
Start: 2018-05-21 | End: 2018-12-11

## 2018-05-21 NOTE — PROGRESS NOTES
Assessment/Plan:    Problem List Items Addressed This Visit     Anxiety    Relevant Medications    sertraline (ZOLOFT) 50 mg tablet    Acute nonintractable headache - Primary    Relevant Medications    naproxen (EC NAPROSYN) 500 MG EC tablet          Patient Instructions   PLENTY OF FLUIDS  SYMPTOM DIARY  MEDICATIONS AS DIRECTED  RV 2 WEEKS, SOONER PRN  MAY NEED TO CONSIDER BW THEN      Return in about 2 weeks (around 6/4/2018) for Recheck  Subjective:      Patient ID: Wilfrid Lindquist is a 24 y o  female  Chief Complaint   Patient presents with    Headache    Fatigue       PATIENT RETURNS  COMPLAINS OF NOT FEELING WELL  HAD COLD LIKE SYMPTOMS 2 WEEKS AGO  NOW FEELS TIRED  OCC NAUSEOUS  HEADACHES OFF AND ON THROUGHOUT THE DAY  DENIES ANY FEVER OR CHILLS  NO VD  NO RASHES    HAS BEEN FEELING SOMEWHAT ANXIOUS AND BLUE  HAPPENED AFTER HER LAST PREGNANCY ALSO      Fatigue   Associated symptoms include fatigue, headaches and nausea  Pertinent negatives include no abdominal pain, arthralgias, chest pain, congestion, fever, joint swelling, numbness, sore throat or vomiting  The following portions of the patient's history were reviewed and updated as appropriate: allergies, current medications, past family history, past medical history, past social history, past surgical history and problem list     Review of Systems   Constitutional: Positive for fatigue  Negative for fever  HENT: Negative for congestion and sore throat  Eyes: Negative for discharge  Respiratory: Negative for chest tightness  Cardiovascular: Negative for chest pain and palpitations  Gastrointestinal: Positive for nausea  Negative for abdominal pain, diarrhea and vomiting  Musculoskeletal: Negative for arthralgias and joint swelling  Neurological: Positive for headaches  Negative for numbness  Psychiatric/Behavioral: Positive for dysphoric mood  The patient is nervous/anxious            Current Outpatient Prescriptions   Medication Sig Dispense Refill    metaxalone (SKELAXIN) 800 mg tablet Take 1 tablet (800 mg total) by mouth 3 (three) times a day 90 tablet 1    naproxen (EC NAPROSYN) 500 MG EC tablet Take 1 tablet (500 mg total) by mouth 2 (two) times a day with meals 42 tablet 1    sertraline (ZOLOFT) 50 mg tablet Take 1 tablet (50 mg total) by mouth daily 30 tablet 1     No current facility-administered medications for this visit  Objective:    /70   Pulse 78   Temp 98 7 °F (37 1 °C) (Temporal)   Resp 16   Ht 5' 4 5" (1 638 m)   Wt 66 7 kg (147 lb)   SpO2 99%   BMI 24 84 kg/m²        Physical Exam   Constitutional: She is oriented to person, place, and time  She appears well-developed and well-nourished  HENT:   Head: Normocephalic and atraumatic  Eyes: Conjunctivae and EOM are normal  Pupils are equal, round, and reactive to light  Right eye exhibits no discharge  Left eye exhibits no discharge  FUNDI WNL   Neck: Normal range of motion  Neck supple  No thyromegaly present  Cardiovascular: Normal rate, regular rhythm and normal heart sounds  No murmur heard  Pulmonary/Chest: Effort normal and breath sounds normal  No respiratory distress  She has no wheezes  She has no rales  Abdominal: Soft  Bowel sounds are normal  There is no tenderness  Musculoskeletal: Normal range of motion  She exhibits no edema or tenderness  Lymphadenopathy:     She has no cervical adenopathy  Neurological: She is alert and oriented to person, place, and time  She has normal reflexes  She displays normal reflexes  No cranial nerve deficit  She exhibits normal muscle tone  Coordination normal    Skin: Skin is warm and dry  No rash noted  No erythema  Psychiatric: She has a normal mood and affect   Her behavior is normal  Judgment and thought content normal               Juni Silva MD

## 2018-05-21 NOTE — PATIENT INSTRUCTIONS
PLENTY OF FLUIDS  SYMPTOM DIARY  MEDICATIONS AS DIRECTED  RV 2 WEEKS, SOONER PRN  MAY NEED TO CONSIDER BW THEN

## 2018-06-08 DIAGNOSIS — F41.9 ANXIETY: Primary | ICD-10-CM

## 2018-06-08 RX ORDER — ALPRAZOLAM 0.25 MG/1
0.25 TABLET ORAL DAILY PRN
Qty: 30 TABLET | Refills: 0 | Status: SHIPPED | OUTPATIENT
Start: 2018-06-08 | End: 2018-12-07 | Stop reason: SDUPTHER

## 2018-06-28 ENCOUNTER — TELEPHONE (OUTPATIENT)
Dept: FAMILY MEDICINE CLINIC | Facility: CLINIC | Age: 28
End: 2018-06-28

## 2018-07-24 ENCOUNTER — OFFICE VISIT (OUTPATIENT)
Dept: FAMILY MEDICINE CLINIC | Facility: CLINIC | Age: 28
End: 2018-07-24
Payer: COMMERCIAL

## 2018-07-24 VITALS
RESPIRATION RATE: 16 BRPM | SYSTOLIC BLOOD PRESSURE: 118 MMHG | HEART RATE: 84 BPM | OXYGEN SATURATION: 99 % | WEIGHT: 151.8 LBS | BODY MASS INDEX: 25.29 KG/M2 | DIASTOLIC BLOOD PRESSURE: 70 MMHG | HEIGHT: 65 IN | TEMPERATURE: 98.9 F

## 2018-07-24 DIAGNOSIS — I73.00 RAYNAUD'S DISEASE WITHOUT GANGRENE: ICD-10-CM

## 2018-07-24 DIAGNOSIS — Z13.29 SCREENING FOR HYPOTHYROIDISM: ICD-10-CM

## 2018-07-24 DIAGNOSIS — J30.1 SEASONAL ALLERGIC RHINITIS DUE TO POLLEN: ICD-10-CM

## 2018-07-24 DIAGNOSIS — M54.6 CHRONIC BILATERAL THORACIC BACK PAIN: ICD-10-CM

## 2018-07-24 DIAGNOSIS — Z00.00 ROUTINE GENERAL MEDICAL EXAMINATION AT A HEALTH CARE FACILITY: Primary | ICD-10-CM

## 2018-07-24 DIAGNOSIS — G89.29 CHRONIC BILATERAL THORACIC BACK PAIN: ICD-10-CM

## 2018-07-24 DIAGNOSIS — Z13.6 SCREENING FOR HYPERTENSION: ICD-10-CM

## 2018-07-24 DIAGNOSIS — Z13.220 SCREENING FOR HYPERLIPIDEMIA: ICD-10-CM

## 2018-07-24 DIAGNOSIS — N92.6 MENSTRUAL ABNORMALITY: ICD-10-CM

## 2018-07-24 PROCEDURE — 36415 COLL VENOUS BLD VENIPUNCTURE: CPT | Performed by: FAMILY MEDICINE

## 2018-07-24 PROCEDURE — 93000 ELECTROCARDIOGRAM COMPLETE: CPT | Performed by: FAMILY MEDICINE

## 2018-07-24 PROCEDURE — 99395 PREV VISIT EST AGE 18-39: CPT | Performed by: FAMILY MEDICINE

## 2018-07-24 NOTE — PATIENT INSTRUCTIONS
DISCUSSED HEALTH ISSUES  HEALTHY DIET AND EXERCISE  BW WILL BE OBTAINED  RECOMMEND CALCIUM 9274-5147 MG DAILY  VITAMIN D3  1000 IU DAILY  RV IN 1 YEAR FOR ANNUAL EXAM, SOONER IF NEEDED

## 2018-07-24 NOTE — PROGRESS NOTES
FAMILY PRACTICE HEALTH MAINTENANCE OFFICE VISIT  [facility]    NAME: Carl Smith  AGE: 24 y o  SEX: female  : 1990     DATE: 2018    Assessment and Plan     Problem List Items Addressed This Visit        Respiratory    Allergic rhinitis       Cardiovascular and Mediastinum    Raynauds syndrome    Relevant Orders    Sedimentation rate, automated    C-reactive protein       Other    Chronic bilateral thoracic back pain    Routine general medical examination at a health care facility - Primary    Relevant Orders    POCT ECG    POCT urine dip auto non-scope    CBC and differential    Comprehensive metabolic panel    Lipid panel    TSH, 3rd generation with Free T4 reflex    Sedimentation rate, automated    C-reactive protein    FSH and LH    Screening for hypothyroidism    Relevant Orders    Lipid panel    TSH, 3rd generation with Free T4 reflex    Screening for hyperlipidemia    Relevant Orders    Lipid panel    TSH, 3rd generation with Free T4 reflex    Screening for hypertension    Relevant Orders    POCT ECG    POCT urine dip auto non-scope    Comprehensive metabolic panel      Other Visit Diagnoses     Menstrual abnormality        Relevant Orders    CBC and differential    TSH, 3rd generation with Free T4 reflex    FSH and LH               Return in about 1 year (around 2019) for Annual physical         Chief Complaint     Chief Complaint   Patient presents with    Physical Exam       History of Present Illness     Julia Gonzalez  NO CONCERNS AT THIS TIME          Well Adult Physical   Patient here for a comprehensive physical exam       Diet and Physical Activity  Diet: well balanced diet  Weight concerns: Patient is overweight (BMI 25 0-29  9)  Exercise: frequently      Depression Screen  PHQ-9 Depression Screening    PHQ-9:    Frequency of the following problems over the past two weeks:       Little interest or pleasure in doing things:  1 - several days  Feeling down, depressed, or hopeless:  1 - several days  PHQ-2 Score:  2          General Health  Hearing: Normal:  bilateral  Vision: no vision problems  Dental: regular dental visits    Reproductive Health          The following portions of the patient's history were reviewed and updated as appropriate: allergies, current medications, past family history, past medical history, past social history, past surgical history and problem list     Review of Systems     Review of Systems   Constitutional: Negative for chills, fatigue and fever  HENT: Negative for congestion, ear discharge, ear pain, mouth sores, postnasal drip, sore throat and trouble swallowing  Eyes: Negative for pain, discharge and visual disturbance  Respiratory: Negative for cough, shortness of breath and wheezing  Cardiovascular: Negative for chest pain, palpitations and leg swelling  Gastrointestinal: Negative for abdominal distention, abdominal pain, blood in stool, diarrhea and nausea  Endocrine: Negative for polydipsia, polyphagia and polyuria  Genitourinary: Negative for dysuria, frequency, hematuria and urgency  Musculoskeletal: Negative for arthralgias, gait problem and joint swelling  Skin: Negative for pallor and rash  Neurological: Negative for dizziness, syncope, speech difficulty, weakness, light-headedness, numbness and headaches  Hematological: Negative for adenopathy  Psychiatric/Behavioral: Negative for behavioral problems, confusion and sleep disturbance  The patient is not nervous/anxious          Past Medical History     Past Medical History:   Diagnosis Date    Atopic dermatitis     Resolved: 22Feb2016    Bunion of great toe of right foot     Last Assessed: 28Sep2015    Chronic sinusitis     Resolved: 22Feb2016    Herpes labialis     Resolved: 22Feb2016    Kidney stone     Ovarian cyst     Peptic ulcer        Past Surgical History     Past Surgical History:   Procedure Laterality Date     SECTION  2015     SECTION  10/20/2017    SINUS SURGERY      WISDOM TOOTH EXTRACTION         Social History     Social History     Social History    Marital status: /Civil Union     Spouse name: N/A    Number of children: N/A    Years of education: N/A     Social History Main Topics    Smoking status: Never Smoker    Smokeless tobacco: Never Used    Alcohol use Yes      Comment: occasional    Drug use: No    Sexual activity: Not Asked     Other Topics Concern    None     Social History Narrative    Caffeine use    Cultural backgroun: Non-    Dental care, regularly    Patient ingests cola containing caffeine    Primary spoken language English    Tea       Family History     Family History   Problem Relation Age of Onset    Hyperlipidemia Father     Hypertension Father     Asthma Mother     Substance Abuse Neg Hx     Mental illness Neg Hx        Current Medications       Current Outpatient Prescriptions:     ALPRAZolam (XANAX) 0 25 mg tablet, Take 1 tablet (0 25 mg total) by mouth daily as needed for anxiety, Disp: 30 tablet, Rfl: 0    metaxalone (SKELAXIN) 800 mg tablet, Take 1 tablet (800 mg total) by mouth 3 (three) times a day, Disp: 90 tablet, Rfl: 1    naproxen (EC NAPROSYN) 500 MG EC tablet, Take 1 tablet (500 mg total) by mouth 2 (two) times a day with meals, Disp: 42 tablet, Rfl: 1    sertraline (ZOLOFT) 50 mg tablet, Take 1 tablet (50 mg total) by mouth daily, Disp: 30 tablet, Rfl: 1     Allergies     Allergies   Allergen Reactions    Prednisone GI Intolerance    Tramadol Itching    Vicodin [Hydrocodone-Acetaminophen] Itching       Objective     /70 (BP Location: Left arm, Patient Position: Sitting, Cuff Size: Standard)   Pulse 84   Temp 98 9 °F (37 2 °C) (Temporal)   Resp 16   Ht 5' 4 5" (1 638 m)   Wt 68 9 kg (151 lb 12 8 oz)   SpO2 99%   BMI 25 65 kg/m²      Physical Exam   Constitutional: She is oriented to person, place, and time  She appears well-developed and well-nourished  HENT:   Head: Normocephalic and atraumatic  Right Ear: External ear normal    Left Ear: External ear normal    Nose: Nose normal    Mouth/Throat: Oropharynx is clear and moist    Eyes: Conjunctivae and EOM are normal  Pupils are equal, round, and reactive to light  Right eye exhibits no discharge  Left eye exhibits no discharge  Neck: Normal range of motion  Neck supple  No thyromegaly present  Cardiovascular: Normal rate, regular rhythm, normal heart sounds and intact distal pulses  No murmur heard  Pulmonary/Chest: Effort normal and breath sounds normal  She has no wheezes  She has no rales  Abdominal: Soft  Bowel sounds are normal  She exhibits no distension and no mass  There is no tenderness  There is no rebound and no guarding  Genitourinary: No breast swelling, tenderness or discharge  Musculoskeletal: Normal range of motion  She exhibits no edema, tenderness or deformity  Lymphadenopathy:     She has no cervical adenopathy  Neurological: She is alert and oriented to person, place, and time  She has normal reflexes  No cranial nerve deficit  She exhibits normal muscle tone  Coordination normal    Skin: Skin is warm and dry  No rash noted  No erythema  Psychiatric: She has a normal mood and affect  Her behavior is normal  Judgment and thought content normal    Vitals reviewed          No exam data present    Health Maintenance     Health Maintenance   Topic Date Due    Depression Screening PHQ-9  1990    INFLUENZA VACCINE  09/01/2018    HIV SCREENING  03/12/2021    DTaP,Tdap,and Td Vaccines (7 - Td) 07/28/2026     Immunization History   Administered Date(s) Administered    DTP 02/08/1993, 04/07/1993, 06/28/1993, 06/14/1994, 08/25/1998    Hib (PRP-OMP) 02/08/1993, 04/07/1993, 12/10/1993    IPV 02/08/1993, 04/07/1993, 06/14/1994, 08/25/1998    MMR 03/17/1994, 08/25/1998    Meningococcal, Unknown Serogroups 05/04/2010    Td (adult), adsorbed 06/21/2007    Tdap 07/28/2016    Tuberculin Skin Test 09/09/1993    Tuberculin Skin Test-PPD Intradermal 12/04/2014       Piter Sigala MD  [facility]

## 2018-07-26 LAB
ALBUMIN SERPL-MCNC: 4.5 G/DL (ref 3.6–5.1)
ALBUMIN/GLOB SERPL: 1.6 (CALC) (ref 1–2.5)
ALP SERPL-CCNC: 68 U/L (ref 33–115)
ALT SERPL-CCNC: 10 U/L (ref 6–29)
AST SERPL-CCNC: 17 U/L (ref 10–30)
BASOPHILS # BLD AUTO: 42 CELLS/UL (ref 0–200)
BASOPHILS NFR BLD AUTO: 0.8 %
BILIRUB SERPL-MCNC: 0.4 MG/DL (ref 0.2–1.2)
BUN SERPL-MCNC: 15 MG/DL (ref 7–25)
BUN/CREAT SERPL: ABNORMAL (CALC) (ref 6–22)
CALCIUM SERPL-MCNC: 9.6 MG/DL (ref 8.6–10.2)
CHLORIDE SERPL-SCNC: 101 MMOL/L (ref 98–110)
CHOLEST SERPL-MCNC: 186 MG/DL
CHOLEST/HDLC SERPL: 3.5 (CALC)
CO2 SERPL-SCNC: 19 MMOL/L (ref 20–31)
CREAT SERPL-MCNC: 0.75 MG/DL (ref 0.5–1.1)
CRP SERPL-MCNC: 1 MG/L
EOSINOPHIL # BLD AUTO: 80 CELLS/UL (ref 15–500)
EOSINOPHIL NFR BLD AUTO: 1.5 %
ERYTHROCYTE [DISTWIDTH] IN BLOOD BY AUTOMATED COUNT: 13.6 % (ref 11–15)
ERYTHROCYTE [SEDIMENTATION RATE] IN BLOOD BY WESTERGREN METHOD: 11 MM/H
FSH SERPL-ACNC: 4.5 MIU/ML
GLOBULIN SER CALC-MCNC: 2.9 G/DL (CALC) (ref 1.9–3.7)
GLUCOSE SERPL-MCNC: 88 MG/DL (ref 65–99)
HCT VFR BLD AUTO: 38 % (ref 35–45)
HDLC SERPL-MCNC: 53 MG/DL
HGB BLD-MCNC: 12 G/DL (ref 11.7–15.5)
LDLC SERPL CALC-MCNC: 113 MG/DL (CALC)
LH SERPL-ACNC: 6 MIU/ML
LYMPHOCYTES # BLD AUTO: 1844 CELLS/UL (ref 850–3900)
LYMPHOCYTES NFR BLD AUTO: 34.8 %
MCH RBC QN AUTO: 25.5 PG (ref 27–33)
MCHC RBC AUTO-ENTMCNC: 31.6 G/DL (ref 32–36)
MCV RBC AUTO: 80.9 FL (ref 80–100)
MONOCYTES # BLD AUTO: 360 CELLS/UL (ref 200–950)
MONOCYTES NFR BLD AUTO: 6.8 %
NEUTROPHILS # BLD AUTO: 2973 CELLS/UL (ref 1500–7800)
NEUTROPHILS NFR BLD AUTO: 56.1 %
NONHDLC SERPL-MCNC: 133 MG/DL (CALC)
PLATELET # BLD AUTO: 345 THOUSAND/UL (ref 140–400)
PMV BLD REES-ECKER: 12 FL (ref 7.5–12.5)
POTASSIUM SERPL-SCNC: 4 MMOL/L (ref 3.5–5.3)
PROT SERPL-MCNC: 7.4 G/DL (ref 6.1–8.1)
RBC # BLD AUTO: 4.7 MILLION/UL (ref 3.8–5.1)
SL AMB EGFR AFRICAN AMERICAN: 128 ML/MIN/1.73M2
SL AMB EGFR NON AFRICAN AMERICAN: 111 ML/MIN/1.73M2
SODIUM SERPL-SCNC: 139 MMOL/L (ref 135–146)
TRIGL SERPL-MCNC: 102 MG/DL
TSH SERPL-ACNC: 1.39 MIU/L
WBC # BLD AUTO: 5.3 THOUSAND/UL (ref 3.8–10.8)

## 2018-07-29 DIAGNOSIS — B37.49 CANDIDA INFECTION OF GENITAL REGION: Primary | ICD-10-CM

## 2018-07-29 RX ORDER — FLUCONAZOLE 150 MG/1
TABLET ORAL
Qty: 2 TABLET | Refills: 0 | Status: SHIPPED | OUTPATIENT
Start: 2018-07-29 | End: 2018-08-01

## 2018-08-07 DIAGNOSIS — F41.9 ANXIETY: Primary | ICD-10-CM

## 2018-08-07 RX ORDER — ESCITALOPRAM OXALATE 10 MG/1
10 TABLET ORAL DAILY
Qty: 30 TABLET | Refills: 3 | Status: SHIPPED | OUTPATIENT
Start: 2018-08-07 | End: 2018-12-11

## 2018-09-08 DIAGNOSIS — A09 DIARRHEA OF INFECTIOUS ORIGIN: Primary | ICD-10-CM

## 2018-10-22 ENCOUNTER — OFFICE VISIT (OUTPATIENT)
Dept: FAMILY MEDICINE CLINIC | Facility: CLINIC | Age: 28
End: 2018-10-22
Payer: COMMERCIAL

## 2018-10-22 VITALS
HEIGHT: 64 IN | OXYGEN SATURATION: 98 % | RESPIRATION RATE: 14 BRPM | TEMPERATURE: 98.4 F | WEIGHT: 154.02 LBS | HEART RATE: 86 BPM | SYSTOLIC BLOOD PRESSURE: 118 MMHG | BODY MASS INDEX: 26.29 KG/M2 | DIASTOLIC BLOOD PRESSURE: 84 MMHG

## 2018-10-22 DIAGNOSIS — Z23 NEED FOR INFLUENZA VACCINATION: ICD-10-CM

## 2018-10-22 DIAGNOSIS — N60.01 CYST OF RIGHT BREAST: ICD-10-CM

## 2018-10-22 DIAGNOSIS — M79.604 PAIN OF RIGHT LOWER EXTREMITY: Primary | ICD-10-CM

## 2018-10-22 PROBLEM — Z00.00 ROUTINE GENERAL MEDICAL EXAMINATION AT A HEALTH CARE FACILITY: Status: RESOLVED | Noted: 2018-07-24 | Resolved: 2018-10-22

## 2018-10-22 PROBLEM — Z13.6 SCREENING FOR HYPERTENSION: Status: RESOLVED | Noted: 2018-07-24 | Resolved: 2018-10-22

## 2018-10-22 PROBLEM — Z13.220 SCREENING FOR HYPERLIPIDEMIA: Status: RESOLVED | Noted: 2018-07-24 | Resolved: 2018-10-22

## 2018-10-22 PROBLEM — Z13.29 SCREENING FOR HYPOTHYROIDISM: Status: RESOLVED | Noted: 2018-07-24 | Resolved: 2018-10-22

## 2018-10-22 PROCEDURE — 90686 IIV4 VACC NO PRSV 0.5 ML IM: CPT

## 2018-10-22 PROCEDURE — 99213 OFFICE O/P EST LOW 20 MIN: CPT | Performed by: FAMILY MEDICINE

## 2018-10-22 PROCEDURE — 90471 IMMUNIZATION ADMIN: CPT

## 2018-10-22 PROCEDURE — 3008F BODY MASS INDEX DOCD: CPT | Performed by: FAMILY MEDICINE

## 2018-10-22 PROCEDURE — 96372 THER/PROPH/DIAG INJ SC/IM: CPT | Performed by: FAMILY MEDICINE

## 2018-10-22 RX ORDER — METHYLPREDNISOLONE ACETATE 40 MG/ML
40 INJECTION, SUSPENSION INTRA-ARTICULAR; INTRALESIONAL; INTRAMUSCULAR; SOFT TISSUE ONCE
Status: COMPLETED | OUTPATIENT
Start: 2018-10-22 | End: 2018-10-22

## 2018-10-22 RX ORDER — DICLOFENAC POTASSIUM 50 MG/1
TABLET, FILM COATED ORAL
Refills: 2 | COMMUNITY
Start: 2018-09-11 | End: 2019-07-01

## 2018-10-22 RX ORDER — NALTREXONE HYDROCHLORIDE 50 MG/1
TABLET, FILM COATED ORAL
Refills: 1 | COMMUNITY
Start: 2018-09-11 | End: 2018-12-11

## 2018-10-22 RX ADMIN — METHYLPREDNISOLONE ACETATE 40 MG: 40 INJECTION, SUSPENSION INTRA-ARTICULAR; INTRALESIONAL; INTRAMUSCULAR; SOFT TISSUE at 10:33

## 2018-10-22 NOTE — PROGRESS NOTES
Assessment/Plan:    Problem List Items Addressed This Visit        Other    Cyst of right breast    Relevant Orders    US breast right limited (diagnostic)    Pain of right lower extremity - Primary    Relevant Medications    methylPREDNISolone acetate (DEPO-MEDROL) injection 40 mg      Other Visit Diagnoses     Need for influenza vaccination        Relevant Orders    SYRINGE/SINGLE-DOSE VIAL: influenza vaccine, 9640-8167, quadrivalent, 0 5 mL, preservative-free, for patients 3+ yr (FLUZONE)          Patient Instructions   REST  WARM COMPRESS TO LEG  TRIAL OF DEPOMEDROL  MAY NEED FURTHER IMAGING IN NOT BETTER    R BREAST US FOR SMALL CYST    CALL IN 2-3 WEEKS WITH UPDATE, SOONER PRN          Return if symptoms worsen or fail to improve  Subjective:      Patient ID: Umair June is a 24 y o  female  Chief Complaint   Patient presents with    calf     Lt, x1 month ago    Flu Vaccine       CINTIA RETURNS FOR 2 ISSUES    APPROX 1 MONTH AGO  NOTED SENSATION IN HER L CALF    DEVELOPED PAIN - WENT TO THE ED  WORKUP INCLUDED US TO R/O DVT    PAIN HAD IMPROVED  SENSATION REMAINS  NOT POSITIONAL  COMES AND GOES    CONCERNED ABOUT SMALL LUMP IN R BREAST  NO NIPPLE DISCHARGE          The following portions of the patient's history were reviewed and updated as appropriate: allergies, current medications, past family history, past medical history, past social history, past surgical history and problem list     Review of Systems   Constitutional: Negative for fever  HENT: Negative for congestion and sore throat  Eyes: Negative for discharge  Respiratory: Negative for chest tightness  Cardiovascular: Negative for chest pain and palpitations  Gastrointestinal: Negative for abdominal pain, diarrhea, nausea and vomiting  Musculoskeletal: Negative for arthralgias and joint swelling  Neurological: Negative for numbness  Psychiatric/Behavioral: The patient is not nervous/anxious            Current Outpatient Prescriptions   Medication Sig Dispense Refill    ALPRAZolam (XANAX) 0 25 mg tablet Take 1 tablet (0 25 mg total) by mouth daily as needed for anxiety 30 tablet 0    diclofenac potassium (CATAFLAM) 50 mg tablet TK 1 T TID FOR UP TO 5 DAYS DURING MENSES  MAY TAKE 2 TS AS INITIAL DOSE  2    metaxalone (SKELAXIN) 800 mg tablet Take 1 tablet (800 mg total) by mouth 3 (three) times a day 90 tablet 1    naltrexone (REVIA) 50 mg tablet TK 1 T PO QD  1    RaNITidine HCl (ZANTAC 75 PO) Take by mouth as needed      escitalopram (LEXAPRO) 10 mg tablet Take 1 tablet (10 mg total) by mouth daily (Patient not taking: Reported on 10/22/2018 ) 30 tablet 3    naproxen (EC NAPROSYN) 500 MG EC tablet Take 1 tablet (500 mg total) by mouth 2 (two) times a day with meals (Patient not taking: Reported on 10/22/2018 ) 42 tablet 1     Current Facility-Administered Medications   Medication Dose Route Frequency Provider Last Rate Last Dose    methylPREDNISolone acetate (DEPO-MEDROL) injection 40 mg  40 mg Intramuscular Once Leo Collins MD           Objective:    /84 (BP Location: Left arm, Patient Position: Sitting, Cuff Size: Standard)   Pulse 86   Temp 98 4 °F (36 9 °C) (Temporal)   Resp 14   Ht 5' 4" (1 626 m)   Wt 69 9 kg (154 lb 0 3 oz)   SpO2 98%   BMI 26 44 kg/m²        Physical Exam   Constitutional: She is oriented to person, place, and time  She appears well-developed and well-nourished  HENT:   Head: Normocephalic and atraumatic  Eyes: Pupils are equal, round, and reactive to light  Conjunctivae and EOM are normal  Right eye exhibits no discharge  Left eye exhibits no discharge  Neck: Normal range of motion  Neck supple  No thyromegaly present  Cardiovascular: Normal rate, regular rhythm and normal heart sounds  No murmur heard  Pulmonary/Chest: Effort normal and breath sounds normal  No respiratory distress  She has no wheezes  She has no rales  Abdominal: Soft   Bowel sounds are normal  There is no tenderness  Musculoskeletal: Normal range of motion  She exhibits no edema or tenderness  Lymphadenopathy:     She has no cervical adenopathy  Neurological: She is alert and oriented to person, place, and time  Skin: Skin is warm and dry  No rash noted  No erythema  SMALL CYST LIKE STRUCTURE  R UPPER OUTER QUADRANT OF BREAST  APPROX 3-4 MM  FREELY MOBILE  NON TENDER   Psychiatric: She has a normal mood and affect   Her behavior is normal  Judgment and thought content normal               Roger Garcia MD

## 2018-10-22 NOTE — PROGRESS NOTES
Seen at [facility] ED 1 5 weeks ago, Ultrasound Negative,   Pinpoint pains, tingling, bubble sensation  No redness, No swelling, No injury

## 2018-10-24 DIAGNOSIS — B37.9 YEAST INFECTION: Primary | ICD-10-CM

## 2018-10-24 RX ORDER — FLUCONAZOLE 150 MG/1
150 TABLET ORAL ONCE
Qty: 1 TABLET | Refills: 0 | Status: SHIPPED | OUTPATIENT
Start: 2018-10-24 | End: 2018-10-24

## 2018-11-13 DIAGNOSIS — N64.4 BREAST TENDERNESS IN FEMALE: Primary | ICD-10-CM

## 2018-11-28 ENCOUNTER — TELEPHONE (OUTPATIENT)
Dept: FAMILY MEDICINE CLINIC | Facility: CLINIC | Age: 28
End: 2018-11-28

## 2018-11-28 NOTE — TELEPHONE ENCOUNTER
Spoke with pt  She did have US done at [facility] 2 weeks ago  Called her GYN, Stalin Humphrey MD at [phone]  They are faxing US breast results

## 2018-11-30 DIAGNOSIS — N64.4 BREAST TENDERNESS IN FEMALE: ICD-10-CM

## 2018-12-07 DIAGNOSIS — F41.9 ANXIETY: ICD-10-CM

## 2018-12-07 RX ORDER — ALPRAZOLAM 0.25 MG/1
TABLET ORAL
Qty: 30 TABLET | Refills: 0 | Status: SHIPPED | OUTPATIENT
Start: 2018-12-07 | End: 2019-01-04 | Stop reason: SDUPTHER

## 2018-12-11 ENCOUNTER — TELEPHONE (OUTPATIENT)
Dept: FAMILY MEDICINE CLINIC | Facility: CLINIC | Age: 28
End: 2018-12-11

## 2018-12-11 ENCOUNTER — OFFICE VISIT (OUTPATIENT)
Dept: FAMILY MEDICINE CLINIC | Facility: CLINIC | Age: 28
End: 2018-12-11
Payer: COMMERCIAL

## 2018-12-11 VITALS
WEIGHT: 161 LBS | TEMPERATURE: 99.2 F | BODY MASS INDEX: 27.49 KG/M2 | RESPIRATION RATE: 16 BRPM | HEART RATE: 78 BPM | SYSTOLIC BLOOD PRESSURE: 124 MMHG | HEIGHT: 64 IN | DIASTOLIC BLOOD PRESSURE: 80 MMHG

## 2018-12-11 DIAGNOSIS — R07.9 CHEST PAIN, UNSPECIFIED TYPE: ICD-10-CM

## 2018-12-11 DIAGNOSIS — F41.9 ANXIETY: ICD-10-CM

## 2018-12-11 DIAGNOSIS — R00.2 PALPITATIONS: Primary | ICD-10-CM

## 2018-12-11 PROBLEM — L98.9 FACIAL SKIN LESION: Status: RESOLVED | Noted: 2018-02-02 | Resolved: 2018-12-11

## 2018-12-11 PROBLEM — M79.604 PAIN OF RIGHT LOWER EXTREMITY: Status: RESOLVED | Noted: 2018-10-22 | Resolved: 2018-12-11

## 2018-12-11 PROBLEM — R51.9 ACUTE NONINTRACTABLE HEADACHE: Status: RESOLVED | Noted: 2018-05-21 | Resolved: 2018-12-11

## 2018-12-11 PROBLEM — Z23 NEED FOR INFLUENZA VACCINATION: Status: RESOLVED | Noted: 2018-10-22 | Resolved: 2018-12-11

## 2018-12-11 LAB — ECG INTERP DURING EX: NORMAL MS

## 2018-12-11 PROCEDURE — 36415 COLL VENOUS BLD VENIPUNCTURE: CPT | Performed by: FAMILY MEDICINE

## 2018-12-11 PROCEDURE — 1036F TOBACCO NON-USER: CPT | Performed by: FAMILY MEDICINE

## 2018-12-11 PROCEDURE — 3008F BODY MASS INDEX DOCD: CPT | Performed by: FAMILY MEDICINE

## 2018-12-11 PROCEDURE — 93000 ELECTROCARDIOGRAM COMPLETE: CPT | Performed by: FAMILY MEDICINE

## 2018-12-11 PROCEDURE — 99214 OFFICE O/P EST MOD 30 MIN: CPT | Performed by: FAMILY MEDICINE

## 2018-12-11 RX ORDER — OXYCODONE HYDROCHLORIDE AND ACETAMINOPHEN 5; 325 MG/1; MG/1
TABLET ORAL
Refills: 0 | COMMUNITY
Start: 2018-12-05 | End: 2019-08-12 | Stop reason: SDUPTHER

## 2018-12-11 NOTE — PROGRESS NOTES
Assessment/Plan:    Problem List Items Addressed This Visit        Other    Anxiety    Relevant Orders    TSH, 3rd generation    CBC and differential    Comprehensive metabolic panel    Lyme disease, western blot    Palpitations - Primary    Relevant Orders    POCT ECG (Completed)    TSH, 3rd generation    CBC and differential    Comprehensive metabolic panel    Lyme disease, western blot    Chest pain    Relevant Orders    TSH, 3rd generation    CBC and differential    Comprehensive metabolic panel    Lyme disease, western blot          Patient Instructions   PLENTY OF FLUIDS  HYDRATION  BW WILL BE OBTAINED  MEDICATION AS DIRECTED    WILL TRY TO OBTAIN ECHO RESULT FROM 2 YRS AGO  RV 2 WEEKS FOR FOLLOW UP      No Follow-up on file  Subjective:      Patient ID: Bhavana Regalado is a 34 y o  female  Chief Complaint   Patient presents with    palpatations     x 2 months       PATIENT FOR THE LAST 2 MONTHS HAS COMPLAINED OF LOCALIZED L CHEST DISCOMFORT  USUALLY NOT ASSOCIATED WITH EXERTION  SOME PALPITATIONS WHICH LAST FOR SEVERAL MINUTES  AND SOME PATEL  NOTES NO DIZZINESS  NO NVD  NO EDEMA    HAS BEEN UNDER SOME STRESS, NOT OVERWHELMING  HAS HAD A HX OF ANXIETY          The following portions of the patient's history were reviewed and updated as appropriate: allergies, current medications, past family history, past medical history, past social history, past surgical history and problem list     Review of Systems   Constitutional: Negative for chills, fatigue and fever  HENT: Negative for congestion, ear discharge, ear pain, mouth sores, postnasal drip, sore throat and trouble swallowing  Eyes: Negative for pain, discharge and visual disturbance  Respiratory: Positive for shortness of breath  Negative for cough and wheezing  Cardiovascular: Positive for chest pain and palpitations  Negative for leg swelling     Gastrointestinal: Negative for abdominal distention, abdominal pain, blood in stool, diarrhea and nausea  Endocrine: Negative for polydipsia, polyphagia and polyuria  Genitourinary: Negative for dysuria, frequency, hematuria and urgency  Musculoskeletal: Negative for arthralgias, gait problem and joint swelling  Skin: Negative for pallor and rash  Neurological: Negative for dizziness, syncope, speech difficulty, weakness, light-headedness, numbness and headaches  Hematological: Negative for adenopathy  Psychiatric/Behavioral: Negative for behavioral problems, confusion and sleep disturbance  The patient is nervous/anxious  Current Outpatient Prescriptions   Medication Sig Dispense Refill    ALPRAZolam (XANAX) 0 25 mg tablet TAKE 1 TABLET(0 25 MG) BY MOUTH DAILY AS NEEDED FOR ANXIETY 30 tablet 0    metaxalone (SKELAXIN) 800 mg tablet Take 1 tablet (800 mg total) by mouth 3 (three) times a day 90 tablet 1    oxyCODONE-acetaminophen (PERCOCET) 5-325 mg per tablet TK 1 T PO Q 4 H PRN  0    RaNITidine HCl (ZANTAC 75 PO) Take by mouth as needed      diclofenac potassium (CATAFLAM) 50 mg tablet TK 1 T TID FOR UP TO 5 DAYS DURING MENSES  MAY TAKE 2 TS AS INITIAL DOSE  2     No current facility-administered medications for this visit  Objective:    /80   Pulse 78   Temp 99 2 °F (37 3 °C) (Temporal)   Resp 16   Ht 5' 4" (1 626 m)   Wt 73 kg (161 lb)   BMI 27 64 kg/m²        Physical Exam   Constitutional: She is oriented to person, place, and time  She appears well-developed and well-nourished  HENT:   Head: Normocephalic and atraumatic  Eyes: Pupils are equal, round, and reactive to light  Conjunctivae and EOM are normal  Right eye exhibits no discharge  Left eye exhibits no discharge  Neck: Normal range of motion  Neck supple  No thyromegaly present  Cardiovascular: Normal rate, regular rhythm and normal heart sounds  No murmur heard  Pulmonary/Chest: Effort normal and breath sounds normal  No respiratory distress  She has no wheezes  She has no rales  Abdominal: Soft  Bowel sounds are normal  There is no tenderness  Musculoskeletal: Normal range of motion  She exhibits no edema or tenderness  Lymphadenopathy:     She has no cervical adenopathy  Neurological: She is alert and oriented to person, place, and time  Skin: Skin is warm and dry  No rash noted  No erythema  Psychiatric: She has a normal mood and affect   Her behavior is normal  Judgment and thought content normal               Alley Burns MD

## 2018-12-11 NOTE — PATIENT INSTRUCTIONS
PLENTY OF FLUIDS  HYDRATION  BW WILL BE OBTAINED  MEDICATION AS DIRECTED    WILL TRY TO OBTAIN ECHO RESULT FROM 2 YRS AGO  RV 2 WEEKS FOR FOLLOW UP

## 2018-12-11 NOTE — TELEPHONE ENCOUNTER
Her cardiologist is Abigail Rey in [facility] [phone] and fax [phone]

## 2018-12-12 ENCOUNTER — TELEPHONE (OUTPATIENT)
Dept: FAMILY MEDICINE CLINIC | Facility: CLINIC | Age: 28
End: 2018-12-12

## 2018-12-12 DIAGNOSIS — F41.9 ANXIETY: Primary | ICD-10-CM

## 2018-12-12 RX ORDER — ESCITALOPRAM OXALATE 10 MG/1
5 TABLET ORAL DAILY
Qty: 30 TABLET | Refills: 3 | Status: SHIPPED | OUTPATIENT
Start: 2018-12-12 | End: 2019-07-01 | Stop reason: ALTCHOICE

## 2018-12-12 NOTE — TELEPHONE ENCOUNTER
Was in yesterday to see you  Patient states you were going to prescribe her Lexipro? Nothing has been sent yet  [facility] in [place]

## 2018-12-13 LAB
ALBUMIN SERPL-MCNC: 4.4 G/DL (ref 3.6–5.1)
ALBUMIN/GLOB SERPL: 1.5 (CALC) (ref 1–2.5)
ALP SERPL-CCNC: 55 U/L (ref 33–115)
ALT SERPL-CCNC: 10 U/L (ref 6–29)
AST SERPL-CCNC: 15 U/L (ref 10–30)
B BURGDOR IGG SER QL IB: NEGATIVE
B BURGDOR IGM SER QL IB: NEGATIVE
B BURGDOR18KD IGG SER QL IB: ABNORMAL
B BURGDOR23KD IGG SER QL IB: ABNORMAL
B BURGDOR23KD IGM SER QL IB: ABNORMAL
B BURGDOR28KD IGG SER QL IB: ABNORMAL
B BURGDOR30KD IGG SER QL IB: ABNORMAL
B BURGDOR39KD IGG SER QL IB: ABNORMAL
B BURGDOR39KD IGM SER QL IB: ABNORMAL
B BURGDOR41KD IGG SER QL IB: REACTIVE
B BURGDOR41KD IGM SER QL IB: REACTIVE
B BURGDOR45KD IGG SER QL IB: ABNORMAL
B BURGDOR58KD IGG SER QL IB: ABNORMAL
B BURGDOR66KD IGG SER QL IB: ABNORMAL
B BURGDOR93KD IGG SER QL IB: ABNORMAL
BASOPHILS # BLD AUTO: 51 CELLS/UL (ref 0–200)
BASOPHILS NFR BLD AUTO: 0.7 %
BILIRUB SERPL-MCNC: 0.4 MG/DL (ref 0.2–1.2)
BUN SERPL-MCNC: 15 MG/DL (ref 7–25)
BUN/CREAT SERPL: NORMAL (CALC) (ref 6–22)
CALCIUM SERPL-MCNC: 9.6 MG/DL (ref 8.6–10.2)
CHLORIDE SERPL-SCNC: 106 MMOL/L (ref 98–110)
CO2 SERPL-SCNC: 25 MMOL/L (ref 20–32)
CREAT SERPL-MCNC: 0.61 MG/DL (ref 0.5–1.1)
EOSINOPHIL # BLD AUTO: 58 CELLS/UL (ref 15–500)
EOSINOPHIL NFR BLD AUTO: 0.8 %
ERYTHROCYTE [DISTWIDTH] IN BLOOD BY AUTOMATED COUNT: 13.8 % (ref 11–15)
GLOBULIN SER CALC-MCNC: 2.9 G/DL (CALC) (ref 1.9–3.7)
GLUCOSE SERPL-MCNC: 84 MG/DL (ref 65–99)
HCT VFR BLD AUTO: 37.6 % (ref 35–45)
HGB BLD-MCNC: 12.5 G/DL (ref 11.7–15.5)
LYMPHOCYTES # BLD AUTO: 1935 CELLS/UL (ref 850–3900)
LYMPHOCYTES NFR BLD AUTO: 26.5 %
MCH RBC QN AUTO: 26.7 PG (ref 27–33)
MCHC RBC AUTO-ENTMCNC: 33.2 G/DL (ref 32–36)
MCV RBC AUTO: 80.2 FL (ref 80–100)
MONOCYTES # BLD AUTO: 358 CELLS/UL (ref 200–950)
MONOCYTES NFR BLD AUTO: 4.9 %
NEUTROPHILS # BLD AUTO: 4898 CELLS/UL (ref 1500–7800)
NEUTROPHILS NFR BLD AUTO: 67.1 %
PLATELET # BLD AUTO: 296 THOUSAND/UL (ref 140–400)
PMV BLD REES-ECKER: 12.1 FL (ref 7.5–12.5)
POTASSIUM SERPL-SCNC: 4.2 MMOL/L (ref 3.5–5.3)
PROT SERPL-MCNC: 7.3 G/DL (ref 6.1–8.1)
RBC # BLD AUTO: 4.69 MILLION/UL (ref 3.8–5.1)
SL AMB EGFR AFRICAN AMERICAN: 145 ML/MIN/1.73M2
SL AMB EGFR NON AFRICAN AMERICAN: 125 ML/MIN/1.73M2
SODIUM SERPL-SCNC: 139 MMOL/L (ref 135–146)
TSH SERPL-ACNC: 1.28 MIU/L
WBC # BLD AUTO: 7.3 THOUSAND/UL (ref 3.8–10.8)

## 2018-12-13 NOTE — TELEPHONE ENCOUNTER
[phone]  Called their office to request reports  Agnes White has not been there since 2016, but they will fax over whatever they have on her

## 2018-12-24 DIAGNOSIS — B00.9 HERPES SIMPLEX: Primary | ICD-10-CM

## 2018-12-24 RX ORDER — VALACYCLOVIR HYDROCHLORIDE 1 G/1
1000 TABLET, FILM COATED ORAL 3 TIMES DAILY
Qty: 21 TABLET | Refills: 0 | Status: SHIPPED | OUTPATIENT
Start: 2018-12-24 | End: 2019-11-27 | Stop reason: SDUPTHER

## 2019-01-03 DIAGNOSIS — B37.3 YEAST VAGINITIS: Primary | ICD-10-CM

## 2019-01-03 RX ORDER — FLUCONAZOLE 150 MG/1
150 TABLET ORAL DAILY
Qty: 2 TABLET | Refills: 0 | Status: SHIPPED | OUTPATIENT
Start: 2019-01-03 | End: 2019-01-07

## 2019-01-04 DIAGNOSIS — F41.9 ANXIETY: ICD-10-CM

## 2019-01-05 RX ORDER — ALPRAZOLAM 0.25 MG/1
0.25 TABLET ORAL 2 TIMES DAILY PRN
Qty: 30 TABLET | Refills: 0 | Status: SHIPPED | OUTPATIENT
Start: 2019-01-05 | End: 2019-07-18 | Stop reason: SDUPTHER

## 2019-03-18 ENCOUNTER — TELEPHONE (OUTPATIENT)
Dept: FAMILY MEDICINE CLINIC | Facility: CLINIC | Age: 29
End: 2019-03-18

## 2019-03-18 NOTE — TELEPHONE ENCOUNTER
She doesn't have insurance right now so she cant come to the office  She has been  vomiting, nausea, diarrhea, and achey  No fever  Her  went to the ER and was diagnosed with a stomach bug  She took Pepto, Zantac, Motrin and none of those are helping    Can you call in rx to [facility]  Allergic to Tramadol and Vicodin

## 2019-03-19 DIAGNOSIS — R11.2 NAUSEA AND VOMITING, INTRACTABILITY OF VOMITING NOT SPECIFIED, UNSPECIFIED VOMITING TYPE: Primary | ICD-10-CM

## 2019-03-19 RX ORDER — ONDANSETRON 4 MG/1
4 TABLET, FILM COATED ORAL EVERY 8 HOURS PRN
Qty: 20 TABLET | Refills: 0 | Status: SHIPPED | OUTPATIENT
Start: 2019-03-19 | End: 2019-07-01 | Stop reason: ALTCHOICE

## 2019-06-27 ENCOUNTER — TELEPHONE (OUTPATIENT)
Dept: OTHER | Facility: OTHER | Age: 29
End: 2019-06-27

## 2019-06-28 ENCOUNTER — TELEPHONE (OUTPATIENT)
Dept: FAMILY MEDICINE CLINIC | Facility: CLINIC | Age: 29
End: 2019-06-28

## 2019-07-01 ENCOUNTER — OFFICE VISIT (OUTPATIENT)
Dept: FAMILY MEDICINE CLINIC | Facility: CLINIC | Age: 29
End: 2019-07-01
Payer: COMMERCIAL

## 2019-07-01 VITALS
BODY MASS INDEX: 27.72 KG/M2 | HEART RATE: 76 BPM | RESPIRATION RATE: 14 BRPM | TEMPERATURE: 98.6 F | DIASTOLIC BLOOD PRESSURE: 80 MMHG | OXYGEN SATURATION: 99 % | WEIGHT: 166.4 LBS | SYSTOLIC BLOOD PRESSURE: 120 MMHG | HEIGHT: 65 IN

## 2019-07-01 DIAGNOSIS — G43.909 MIGRAINE WITHOUT STATUS MIGRAINOSUS, NOT INTRACTABLE, UNSPECIFIED MIGRAINE TYPE: ICD-10-CM

## 2019-07-01 DIAGNOSIS — R51.9 CHRONIC NONINTRACTABLE HEADACHE, UNSPECIFIED HEADACHE TYPE: ICD-10-CM

## 2019-07-01 DIAGNOSIS — G89.29 CHRONIC NONINTRACTABLE HEADACHE, UNSPECIFIED HEADACHE TYPE: ICD-10-CM

## 2019-07-01 DIAGNOSIS — R10.13 DYSPEPSIA: Primary | ICD-10-CM

## 2019-07-01 PROBLEM — N80.00 UTERUS, ADENOMYOSIS: Status: ACTIVE | Noted: 2019-07-01

## 2019-07-01 PROBLEM — N80.03 UTERUS, ADENOMYOSIS: Status: ACTIVE | Noted: 2019-07-01

## 2019-07-01 PROBLEM — N80.0 UTERUS, ADENOMYOSIS: Status: ACTIVE | Noted: 2019-07-01

## 2019-07-01 PROBLEM — R12 HEARTBURN: Status: ACTIVE | Noted: 2019-01-01

## 2019-07-01 PROCEDURE — 1036F TOBACCO NON-USER: CPT | Performed by: FAMILY MEDICINE

## 2019-07-01 PROCEDURE — 99214 OFFICE O/P EST MOD 30 MIN: CPT | Performed by: FAMILY MEDICINE

## 2019-07-01 PROCEDURE — 3008F BODY MASS INDEX DOCD: CPT | Performed by: FAMILY MEDICINE

## 2019-07-01 RX ORDER — NAPROXEN 375 MG/1
375 TABLET, DELAYED RELEASE ORAL
Qty: 60 EACH | Refills: 0 | Status: SHIPPED | OUTPATIENT
Start: 2019-07-01 | End: 2020-01-17 | Stop reason: ALTCHOICE

## 2019-07-01 RX ORDER — SUCRALFATE 1 G/1
1 TABLET ORAL 4 TIMES DAILY
Qty: 40 TABLET | Refills: 0 | Status: SHIPPED | OUTPATIENT
Start: 2019-07-01 | End: 2019-11-26 | Stop reason: ALTCHOICE

## 2019-07-01 RX ORDER — OMEPRAZOLE 40 MG/1
40 CAPSULE, DELAYED RELEASE ORAL 2 TIMES DAILY
Qty: 60 CAPSULE | Refills: 1 | Status: SHIPPED | OUTPATIENT
Start: 2019-07-01 | End: 2019-11-26 | Stop reason: ALTCHOICE

## 2019-07-01 NOTE — PROGRESS NOTES
Assessment/Plan:    Problem List Items Addressed This Visit        Cardiovascular and Mediastinum    Migraine without status migrainosus, not intractable    Relevant Medications    naproxen (EC NAPROSYN) 375 MG TBEC       Other    Chronic nonintractable headache    Dyspepsia - Primary    Relevant Medications    omeprazole (PriLOSEC) 40 MG capsule    sucralfate (CARAFATE) 1 g tablet          BMI Counseling: Body mass index is 28 12 kg/m²  Discussed the patient's BMI with her  The BMI is above average  BMI counseling and education was provided to the patient  Nutrition recommendations include decreasing overall calorie intake  Patient Instructions   PLENTY OF FLUIDS  BLAND DIET  AVOID CAFFEINE AND EXCEDRIN    MEDICATION AS DIRECTED  UPDATE IN 2 WEEKS, SOONER PRN    RV 1 M      Return in about 1 month (around 7/29/2019) for Recheck  Subjective:      Patient ID: Elliott Nuñez is a 34 y o  female  Chief Complaint   Patient presents with    Migraine     worse past 4 months    Heartburn     worse past 4 months - seen at [facility], [place] 6/27/19 CXR, US, BW,       Migraine    This is a chronic problem  The current episode started more than 1 year ago  The problem occurs daily  The problem has been unchanged  The pain is located in the frontal region  The pain does not radiate  The pain quality is similar to prior headaches  The quality of the pain is described as aching and band-like  The pain is mild  Associated symptoms include abdominal pain and nausea  Pertinent negatives include no abnormal behavior, anorexia, back pain, blurred vision, coughing, dizziness, drainage, ear pain, eye pain, eye redness, eye watering, facial sweating, fever, hearing loss, insomnia, loss of balance, muscle aches, neck pain, numbness, phonophobia, photophobia, rhinorrhea, scalp tenderness, seizures, sinus pressure, sore throat, swollen glands, tingling, tinnitus, visual change, vomiting, weakness or weight loss   Nothing aggravates the symptoms  She has tried NSAIDs for the symptoms  The treatment provided mild relief  Her past medical history is significant for migraine headaches  Heartburn   She complains of abdominal pain, chest pain, heartburn and nausea  She reports no belching, no choking, no coughing, no dysphagia, no early satiety, no globus sensation, no hoarse voice, no sore throat, no stridor, no tooth decay, no water brash or no wheezing  This is a recurrent problem  The current episode started more than 1 month ago  The problem occurs constantly  The problem has been gradually worsening  The heartburn duration is several minutes  The heartburn is located in the substernum and LUQ  The heartburn is of moderate intensity  The heartburn does not wake her from sleep  The heartburn does not limit her activity  The heartburn doesn't change with position  The symptoms are aggravated by certain foods and caffeine  Associated symptoms include fatigue  Pertinent negatives include no anemia, melena, muscle weakness, orthopnea or weight loss  Risk factors include caffeine use and NSAIDs  She has tried an antacid for the symptoms  The treatment provided mild relief  The following portions of the patient's history were reviewed and updated as appropriate: allergies, current medications, past family history, past medical history, past social history, past surgical history and problem list     Review of Systems   Constitutional: Positive for fatigue  Negative for chills, fever and weight loss  HENT: Negative for congestion, ear discharge, ear pain, hearing loss, hoarse voice, mouth sores, postnasal drip, rhinorrhea, sinus pressure, sore throat, tinnitus and trouble swallowing  Eyes: Negative for blurred vision, photophobia, pain, discharge, redness and visual disturbance  Respiratory: Negative for cough, choking, shortness of breath and wheezing  Cardiovascular: Positive for chest pain   Negative for palpitations and leg swelling  Gastrointestinal: Positive for abdominal pain, heartburn and nausea  Negative for abdominal distention, anorexia, blood in stool, diarrhea, dysphagia, melena and vomiting  Endocrine: Negative for polydipsia, polyphagia and polyuria  Genitourinary: Negative for dysuria, frequency, hematuria and urgency  Musculoskeletal: Negative for arthralgias, back pain, gait problem, joint swelling, muscle weakness and neck pain  Skin: Negative for pallor and rash  Neurological: Negative for dizziness, tingling, seizures, syncope, speech difficulty, weakness, light-headedness, numbness, headaches and loss of balance  Hematological: Negative for adenopathy  Psychiatric/Behavioral: Negative for behavioral problems, confusion and sleep disturbance  The patient is not nervous/anxious and does not have insomnia  Current Outpatient Medications   Medication Sig Dispense Refill    ALPRAZolam (XANAX) 0 25 mg tablet Take 1 tablet (0 25 mg total) by mouth 2 (two) times a day as needed for anxiety 30 tablet 0    Alum & Mag Hydroxide-Simeth (MYLANTA PO) Take 10 mL by mouth as needed (3-4 times per day)      oxyCODONE-acetaminophen (PERCOCET) 5-325 mg per tablet TK 1 T PO Q 4 H PRN  0    RaNITidine HCl (ZANTAC 75 PO) Take by mouth as needed      valACYclovir (VALTREX) 1,000 mg tablet Take 1 tablet (1,000 mg total) by mouth 3 (three) times a day for 7 days (Patient taking differently: Take 1,000 mg by mouth as needed ) 21 tablet 0    naproxen (EC NAPROSYN) 375 MG TBEC Take 1 tablet (375 mg total) by mouth 3 (three) times a day with meals 60 each 0    omeprazole (PriLOSEC) 40 MG capsule Take 1 capsule (40 mg total) by mouth 2 (two) times a day 60 capsule 1    sucralfate (CARAFATE) 1 g tablet Take 1 tablet (1 g total) by mouth 4 (four) times a day 40 tablet 0     No current facility-administered medications for this visit          Objective:    /80 (BP Location: Left arm, Patient Position: Sitting, Cuff Size: Standard)   Pulse 76   Temp 98 6 °F (37 °C) (Temporal)   Resp 14   Ht 5' 4 5" (1 638 m)   Wt 75 5 kg (166 lb 6 4 oz)   SpO2 99%   BMI 28 12 kg/m²        Physical Exam   Constitutional: She is oriented to person, place, and time  She appears well-developed and well-nourished  HENT:   Head: Normocephalic and atraumatic  Eyes: Pupils are equal, round, and reactive to light  Conjunctivae and EOM are normal  Right eye exhibits no discharge  Left eye exhibits no discharge  Neck: Normal range of motion  Neck supple  No thyromegaly present  Cardiovascular: Normal rate, regular rhythm and normal heart sounds  No murmur heard  Pulmonary/Chest: Effort normal and breath sounds normal  No respiratory distress  She has no wheezes  She has no rales  Abdominal: Soft  Bowel sounds are normal  She exhibits no mass  There is tenderness  There is no rebound and no guarding  No hernia  Musculoskeletal: Normal range of motion  She exhibits no edema or tenderness  Lymphadenopathy:     She has no cervical adenopathy  Neurological: She is alert and oriented to person, place, and time  She displays normal reflexes  No cranial nerve deficit or sensory deficit  She exhibits normal muscle tone  Coordination normal    Skin: Skin is warm and dry  No rash noted  No erythema  Psychiatric: She has a normal mood and affect   Her behavior is normal  Judgment and thought content normal               Trent Chowdhury MD

## 2019-07-03 ENCOUNTER — TELEPHONE (OUTPATIENT)
Dept: FAMILY MEDICINE CLINIC | Facility: CLINIC | Age: 29
End: 2019-07-03

## 2019-07-03 NOTE — TELEPHONE ENCOUNTER
Tobin Jeffries called back  Patient informed  Tobin Jeffries asked if she can take something with it for the nausea like Mylanta?

## 2019-07-03 NOTE — TELEPHONE ENCOUNTER
She took the carafate today at 1pm and got nauseous a half hour later and feels like she could vomit, but she hasn't  Is there anything she should do? Does she keep taking it? Please advise [phone]

## 2019-07-03 NOTE — TELEPHONE ENCOUNTER
Attempted to call patient, but no answer and mailbox full  Could not leave a message  Will attempt to call back

## 2019-07-18 DIAGNOSIS — F41.9 ANXIETY: ICD-10-CM

## 2019-07-18 RX ORDER — ALPRAZOLAM 0.25 MG/1
0.25 TABLET ORAL 2 TIMES DAILY PRN
Qty: 30 TABLET | Refills: 0 | Status: SHIPPED | OUTPATIENT
Start: 2019-07-18 | End: 2019-10-28 | Stop reason: SDUPTHER

## 2019-08-01 ENCOUNTER — OFFICE VISIT (OUTPATIENT)
Dept: FAMILY MEDICINE CLINIC | Facility: CLINIC | Age: 29
End: 2019-08-01
Payer: COMMERCIAL

## 2019-08-01 VITALS
RESPIRATION RATE: 14 BRPM | SYSTOLIC BLOOD PRESSURE: 140 MMHG | HEART RATE: 68 BPM | HEIGHT: 64 IN | TEMPERATURE: 98 F | DIASTOLIC BLOOD PRESSURE: 80 MMHG | BODY MASS INDEX: 28.95 KG/M2 | WEIGHT: 169.6 LBS

## 2019-08-01 DIAGNOSIS — Z13.220 SCREENING FOR HYPERLIPIDEMIA: ICD-10-CM

## 2019-08-01 DIAGNOSIS — J30.1 SEASONAL ALLERGIC RHINITIS DUE TO POLLEN: ICD-10-CM

## 2019-08-01 DIAGNOSIS — N94.6 DYSMENORRHEA: ICD-10-CM

## 2019-08-01 DIAGNOSIS — I73.00 RAYNAUD'S DISEASE WITHOUT GANGRENE: ICD-10-CM

## 2019-08-01 DIAGNOSIS — Z00.00 ROUTINE GENERAL MEDICAL EXAMINATION AT A HEALTH CARE FACILITY: Primary | ICD-10-CM

## 2019-08-01 DIAGNOSIS — Z13.6 SCREENING FOR HYPERTENSION: ICD-10-CM

## 2019-08-01 DIAGNOSIS — Z13.29 SCREENING FOR HYPOTHYROIDISM: ICD-10-CM

## 2019-08-01 DIAGNOSIS — G43.909 MIGRAINE WITHOUT STATUS MIGRAINOSUS, NOT INTRACTABLE, UNSPECIFIED MIGRAINE TYPE: ICD-10-CM

## 2019-08-01 PROBLEM — R07.9 CHEST PAIN: Status: RESOLVED | Noted: 2018-12-11 | Resolved: 2019-08-01

## 2019-08-01 PROBLEM — R00.2 PALPITATIONS: Status: RESOLVED | Noted: 2018-12-11 | Resolved: 2019-08-01

## 2019-08-01 PROBLEM — R12 HEARTBURN: Status: RESOLVED | Noted: 2019-01-01 | Resolved: 2019-08-01

## 2019-08-01 PROBLEM — N60.01 CYST OF RIGHT BREAST: Status: RESOLVED | Noted: 2018-10-22 | Resolved: 2019-08-01

## 2019-08-01 LAB
BASOPHILS # BLD AUTO: 0 X10E3/UL (ref 0–0.2)
BASOPHILS NFR BLD AUTO: 0 %
EOSINOPHIL # BLD AUTO: 0.1 X10E3/UL (ref 0–0.4)
EOSINOPHIL NFR BLD AUTO: 1 %
ERYTHROCYTE [DISTWIDTH] IN BLOOD BY AUTOMATED COUNT: 14.5 % (ref 12.3–15.4)
HCT VFR BLD AUTO: 36.3 % (ref 34–46.6)
HGB BLD-MCNC: 12 G/DL (ref 11.1–15.9)
IMM GRANULOCYTES # BLD: 0 X10E3/UL (ref 0–0.1)
IMM GRANULOCYTES NFR BLD: 0 %
LYMPHOCYTES # BLD AUTO: 1.7 X10E3/UL (ref 0.7–3.1)
LYMPHOCYTES NFR BLD AUTO: 29 %
MCH RBC QN AUTO: 25.9 PG (ref 26.6–33)
MCHC RBC AUTO-ENTMCNC: 33.1 G/DL (ref 31.5–35.7)
MCV RBC AUTO: 78 FL (ref 79–97)
MONOCYTES # BLD AUTO: 0.3 X10E3/UL (ref 0.1–0.9)
MONOCYTES NFR BLD AUTO: 5 %
NEUTROPHILS # BLD AUTO: 3.8 X10E3/UL (ref 1.4–7)
NEUTROPHILS NFR BLD AUTO: 65 %
PLATELET # BLD AUTO: 318 X10E3/UL (ref 150–450)
RBC # BLD AUTO: 4.64 X10E6/UL (ref 3.77–5.28)
WBC # BLD AUTO: 6 X10E3/UL (ref 3.4–10.8)

## 2019-08-01 PROCEDURE — 36415 COLL VENOUS BLD VENIPUNCTURE: CPT | Performed by: FAMILY MEDICINE

## 2019-08-01 PROCEDURE — 99395 PREV VISIT EST AGE 18-39: CPT | Performed by: FAMILY MEDICINE

## 2019-08-01 PROCEDURE — 3725F SCREEN DEPRESSION PERFORMED: CPT | Performed by: FAMILY MEDICINE

## 2019-08-01 NOTE — PATIENT INSTRUCTIONS
DISCUSSED HEALTH ISSUES  HEALTHY DIET AND EXERCISE  BW WILL BE OBTAINED  RECOMMEND CALCIUM 7659-9275 MG DAILY  VITAMIN D3  1000 IU DAILY  RV IN 1 YEAR FOR ANNUAL EXAM, SOONER IF NEEDED

## 2019-08-01 NOTE — PROGRESS NOTES
Assessment/Plan:    No problem-specific Assessment & Plan notes found for this encounter  Diagnoses and all orders for this visit:    Routine general medical examination at a health care facility  -     TSH, 3rd generation  -     CBC and differential  -     Comprehensive metabolic panel  -     Lipid panel    Migraine without status migrainosus, not intractable, unspecified migraine type    Raynaud's disease without gangrene  -     TSH, 3rd generation  -     CBC and differential    Seasonal allergic rhinitis due to pollen    Screening for hypertension  -     Comprehensive metabolic panel    Screening for hyperlipidemia  -     Lipid panel    Screening for hypothyroidism  -     TSH, 3rd generation    Dysmenorrhea  -     Estrogens, total  -     Progesterone          Patient Instructions   DISCUSSED HEALTH ISSUES  HEALTHY DIET AND EXERCISE  BW WILL BE OBTAINED  RECOMMEND CALCIUM 2234-0243 MG DAILY  VITAMIN D3  1000 IU DAILY  RV IN 1 YEAR FOR ANNUAL EXAM, SOONER IF NEEDED        Return in about 1 year (around 8/1/2020) for Annual physical     Subjective:      Patient ID: Carl Smith is a 34 y o  female  Chief Complaint   Patient presents with    Annual Exam    Gynecologic Exam     needs medication refill Percocet - had PAP last year with Dr Cherise Dodge        The following portions of the patient's history were reviewed and updated as appropriate: allergies, current medications, past family history, past medical history, past social history, past surgical history and problem list     Review of Systems   Constitutional: Negative for chills, fatigue and fever  HENT: Negative for congestion, ear discharge, ear pain, mouth sores, postnasal drip, sore throat and trouble swallowing  Eyes: Negative for pain, discharge and visual disturbance  Respiratory: Negative for cough, shortness of breath and wheezing      Cardiovascular: Negative for chest pain, palpitations and leg swelling  Gastrointestinal: Negative for abdominal distention, abdominal pain, blood in stool, diarrhea and nausea  Endocrine: Negative for polydipsia, polyphagia and polyuria  Genitourinary: Negative for dysuria, frequency, hematuria and urgency  Musculoskeletal: Negative for arthralgias, gait problem and joint swelling  Skin: Negative for pallor and rash  Neurological: Negative for dizziness, syncope, speech difficulty, weakness, light-headedness, numbness and headaches  Hematological: Negative for adenopathy  Psychiatric/Behavioral: Negative for behavioral problems, confusion and sleep disturbance  The patient is not nervous/anxious  Current Outpatient Medications   Medication Sig Dispense Refill    ALPRAZolam (XANAX) 0 25 mg tablet Take 1 tablet (0 25 mg total) by mouth 2 (two) times a day as needed for anxiety 30 tablet 0    Alum & Mag Hydroxide-Simeth (MYLANTA PO) Take 10 mL by mouth as needed (3-4 times per day)      naproxen (EC NAPROSYN) 375 MG TBEC Take 1 tablet (375 mg total) by mouth 3 (three) times a day with meals 60 each 0    omeprazole (PriLOSEC) 40 MG capsule Take 1 capsule (40 mg total) by mouth 2 (two) times a day 60 capsule 1    oxyCODONE-acetaminophen (PERCOCET) 5-325 mg per tablet TK 1 T PO Q 4 H PRN  0    RaNITidine HCl (ZANTAC 75 PO) Take by mouth as needed      sucralfate (CARAFATE) 1 g tablet Take 1 tablet (1 g total) by mouth 4 (four) times a day (Patient not taking: Reported on 8/1/2019) 40 tablet 0    valACYclovir (VALTREX) 1,000 mg tablet Take 1 tablet (1,000 mg total) by mouth 3 (three) times a day for 7 days (Patient not taking: Reported on 8/1/2019) 21 tablet 0     No current facility-administered medications for this visit          Objective:    /80 (BP Location: Left arm, Patient Position: Sitting, Cuff Size: Standard)   Pulse 68   Temp 98 °F (36 7 °C) (Temporal)   Resp 14   Ht 5' 4" (1 626 m)   Wt 76 9 kg (169 lb 9 6 oz)   BMI 29 11 kg/m²        Physical Exam   Constitutional: She is oriented to person, place, and time  She appears well-developed and well-nourished  HENT:   Head: Normocephalic and atraumatic  Right Ear: External ear normal    Left Ear: External ear normal    Nose: Nose normal    Mouth/Throat: Oropharynx is clear and moist    Eyes: Pupils are equal, round, and reactive to light  Conjunctivae and EOM are normal  Right eye exhibits no discharge  Left eye exhibits no discharge  Neck: Normal range of motion  Neck supple  No thyromegaly present  Cardiovascular: Normal rate, regular rhythm, normal heart sounds and intact distal pulses  No murmur heard  Pulmonary/Chest: Effort normal and breath sounds normal  She has no wheezes  She has no rales  No breast tenderness or discharge  Abdominal: Soft  Bowel sounds are normal  She exhibits no distension and no mass  There is no tenderness  There is no rebound and no guarding  Genitourinary: No breast tenderness or discharge  Musculoskeletal: Normal range of motion  She exhibits no edema, tenderness or deformity  Lymphadenopathy:     She has no cervical adenopathy  Neurological: She is alert and oriented to person, place, and time  She has normal reflexes  No cranial nerve deficit  She exhibits normal muscle tone  Coordination normal    Skin: Skin is warm and dry  No rash noted  No erythema  Psychiatric: She has a normal mood and affect  Her behavior is normal  Judgment and thought content normal    Vitals reviewed               Mona Castro MD

## 2019-08-02 LAB
ALBUMIN SERPL-MCNC: 4.4 G/DL (ref 3.5–5.5)
ALBUMIN/GLOB SERPL: 1.6 {RATIO} (ref 1.2–2.2)
ALP SERPL-CCNC: 59 IU/L (ref 39–117)
ALT SERPL-CCNC: 11 IU/L (ref 0–32)
AST SERPL-CCNC: 16 IU/L (ref 0–40)
BILIRUB SERPL-MCNC: 0.5 MG/DL (ref 0–1.2)
BUN SERPL-MCNC: 14 MG/DL (ref 6–20)
BUN/CREAT SERPL: 21 (ref 9–23)
CALCIUM SERPL-MCNC: 9.5 MG/DL (ref 8.7–10.2)
CHLORIDE SERPL-SCNC: 104 MMOL/L (ref 96–106)
CHOLEST SERPL-MCNC: 156 MG/DL (ref 100–199)
CHOLEST/HDLC SERPL: 3.9 RATIO (ref 0–4.4)
CO2 SERPL-SCNC: 22 MMOL/L (ref 20–29)
CREAT SERPL-MCNC: 0.66 MG/DL (ref 0.57–1)
GLOBULIN SER-MCNC: 2.8 G/DL (ref 1.5–4.5)
GLUCOSE SERPL-MCNC: 83 MG/DL (ref 65–99)
HDLC SERPL-MCNC: 40 MG/DL
LDLC SERPL CALC-MCNC: 93 MG/DL (ref 0–99)
POTASSIUM SERPL-SCNC: 4.4 MMOL/L (ref 3.5–5.2)
PROGEST SERPL-MCNC: 12.1 NG/ML
PROT SERPL-MCNC: 7.2 G/DL (ref 6–8.5)
SL AMB EGFR AFRICAN AMERICAN: 141 ML/MIN/1.73
SL AMB EGFR NON AFRICAN AMERICAN: 122 ML/MIN/1.73
SL AMB VLDL CHOLESTEROL CALC: 23 MG/DL (ref 5–40)
SODIUM SERPL-SCNC: 137 MMOL/L (ref 134–144)
TRIGL SERPL-MCNC: 115 MG/DL (ref 0–149)
TSH SERPL DL<=0.005 MIU/L-ACNC: 2.29 UIU/ML (ref 0.45–4.5)

## 2019-08-05 LAB — ESTROGEN SERPL-MCNC: 316 PG/ML

## 2019-08-12 ENCOUNTER — TELEPHONE (OUTPATIENT)
Dept: FAMILY MEDICINE CLINIC | Facility: CLINIC | Age: 29
End: 2019-08-12

## 2019-08-12 DIAGNOSIS — R11.2 NAUSEA AND VOMITING, INTRACTABILITY OF VOMITING NOT SPECIFIED, UNSPECIFIED VOMITING TYPE: Primary | ICD-10-CM

## 2019-08-12 DIAGNOSIS — N94.6 DYSMENORRHEA: ICD-10-CM

## 2019-08-12 RX ORDER — OXYCODONE HYDROCHLORIDE AND ACETAMINOPHEN 5; 325 MG/1; MG/1
1 TABLET ORAL EVERY 6 HOURS PRN
Qty: 30 TABLET | Refills: 0 | Status: SHIPPED | OUTPATIENT
Start: 2019-08-12 | End: 2020-03-09 | Stop reason: SDUPTHER

## 2019-08-12 NOTE — TELEPHONE ENCOUNTER
[facility]     PROGRESS NOTE    Patient: Lyndon Augustin Date: 4/25/2019   male, 63 year old  Admit Date: 4/22/2019   Attending: Pradeep Mcguire MD Primary Care Physician: No Pcp     ICU admission Date: 4/22/2019     Operation: MYOCARDIAL REVASCULARIZATION WITH ENDOSCOPIC VEIN HARVESTING AND AORTIC VALVE REPLACEMENT    Post-operative Day: 3                                                                 SUMMARY OF PLAN    Summary:   62 y/o M w/ PMHx notable for HTN, HL, CAD s/p PCI, aortic stenosis, chronic neck pain (on home opioids), distant ETOH abuse, tobacco use, and recent diagnosis of COPD/Asthma who underwent elective AVR, CABGx1 by Dr. Mcguire on 04/22/19.  Post-operatively, he had issues with acute hypoxic/hypercarbic respiratory failure, Afib with RVR, and difficult-to-control pain.             1. Acute hypoxic/hypercarbic respiratory failure (improving): transitioned off NIPPV to Optiflow; wean O2 as tolerated; continue aggressive pulmonary hygiene: duonebs standing, saline nebulizer, chest physiotherapy/percussion; OOB/IS/deep breathing exercises; anticipate intermittent NIPPV requirement; continue home breo and guaifenesin           2. Analgesia/Chronic pain:  Pain management following; pain well controlled; continue current regimen: MS contin, Norco, APAP PRN; low dose tizanidine           3. Valvular Heart Disease s/p tissue AVR:  ASA 81           4. CAD s/p CABG: ASA 81, statin, beta-blocker           5. Post-op Afib: currently in NSR; amiodarone gtt to 0.5 mg/min, PO metoprolol           6. Acute blood loss anemia:  No overt bleeding; Hb stable; monitor    -----------------------------    Best Practice:  - VTE: SCDs; holding chemical DVT PPx given risk of bleeding  - SUP: famotidine  - Glycemic control: Lispro SSI  - LDA assessment and Removal: A-line, CVP  - Nutrition:  ,   Transitional diet  - Last BM:1 (04/25/19 7945)  - Therapy/mobilization: PT/OT/cardiac rehab    ACTIVE  When she was in for her CPX, you discussed with her about having an Abdominal US? She does not have an order and would like to have one    Also would like to have the medication you spoke about in the CPX to be prescribed  You told her to call in if she needed it    It was Oxycodone    Sent to [facility] in [place] PROBLEM LIST  See Below    Disposition: ICU    CODE STATUS: FULL     Interval Events:   - Awake Bronch yesterday:  RML mucus plugging  - Afib w/ RVR:  Amiodarone gtt started  - Transitioned off NIPPV to Optiflow  - Gradual improvement in oxygenation    Subjective:   - Pain control improved  - Still feels SOB    NEURO:   # Analgesia:   - h/o chronic pain (on home opioids)  - Pain management following  - pain well controlled  - MS contin, APAP, Norco, APAP PRN  - Low-dose tizanidine    # Depression:  - Home Celexa/Wellbutrin    # Sedation: None, please limit sedatives (particularly benzodiazepines) given risk of respiratory depression and precipitating ICU delirium    GCS  Is Patient Receiving Medication to Decrease LOC?: No (04/25/19 1200)  Symptoms of Increased ICP: None (04/25/19 1200)  Eye Opening: Spontaneous (04/25/19 1200)  Verbal Response: Oriented and converses (04/25/19 1200)  Motor Response: Obeys verbal commands (04/25/19 1200)  GCS Score: 15 (04/25/19 1200)   Eye Spontaneous (04/25/19 1200)    Verbal Oriented and converses (04/25/19 1200)    Motor Obeys verbal commands (04/25/19 1200)    L Pupil 3 mm;Reactive (04/23/19 2000)   R Pupil 3 mm;Reactive (04/23/19 2000)   ICP Symptoms None (04/25/19 1200)   CAM-ICU     ANEESH           Level of Consciousness: Lethargic   Pain Score Pain Assessment Tool: Numeric rating scale 0-10, 2     CV:  - Infusions: None  - Titration goals: MAP 65-80 mmHg  - Invasive monitoring: A-line, CVP    # Aortic Stenosis (severe), aortic insufficiency (moderate) s/p tissue AVR  - ASA 81    # CAD s/p CABGx1  - ASA 81  - statin  - beta-blocker  - defer restarting home plavix to CT surgery    # post-op Afib  - currently in NSR  - ASA 81  - holding additional anti-coagulation  - beta-blocker  - amiodarone gtt to 0.5mg/min    Sinus rhythm  77 Pulse  Min: 69  Max: 122   Blood Pressure 126/59 BP  Min: 85/53  Max: 143/63   Art /59 Arterial Line BP  Min: 122/58  Max: 160/75   CVP (!) 226  mmHg CVP (mmHg)  Min: 11 mmHg  Max: 256 mmHg     SvO2 (!) 86 % No data recorded   Cardiac Output 7 l/min No data recorded   Cardiac index 3.7 l/min/m2 No data recorded     PULM:  # Acute hypoxic/hypercarbic respiratory failure  - Slowly improving oxygenation  - Awake Bronch (04/24): RML mucus plugging  - Transitioned from NIPPV to Optiflow today  - Continue aggressive pulmonary hygiene:  OOB/IS/Deep breathing exercises; saline nebulizers, guaifenesin, chest physiotherapy/percusion for secretion mobilization  - Wean O2 as tolerated  - Anticipate intermittent NIPPV requirement  - Home Breo  - Duonebs Q4H standing    - CXR: right lower lung opacity c/w atelectasis, infiltrate, collapse; right IJ CVC with tip in adequate position; mild pulmonary congestion    - ABG:   Lab Results   Component Value Date    APH 7.47 (H) 04/25/2019    APCO2 43 04/25/2019    APO2 75 (L) 04/25/2019    AHCO3 31 (H) 04/25/2019    ASAT 89 (L) 04/23/2019     FEN/GI:  - Replete lytes prn  - Bowel regimen prn    # Nutrition  - Diet: Transitional diet    RN Assessment Enteral Nutrition   Type     Rate      Residual      Last BM 1 (04/25/19 1045)   Stool        :  - Making adequate UOP  - Continue to monitor  - Gentle diuresis today    - UOP: adequate  - Sherwood: removed     Weight: 79.4 kg, Admit: Weight: 80 kg  I/O last 3 completed shifts:  In: 880 [P.O.:240; I.V.:640]  Out: 2668 [Urine:2618; Chest Tube:50]    Intake/Output Summary (Last 24 hours) at 4/25/2019 1421  Last data filed at 4/25/2019 1000  Gross per 24 hour   Intake 820 ml   Output 2538 ml   Net -1718 ml       Recent Labs   Lab 04/25/19  0400 04/24/19  2315 04/24/19  0430  04/23/19  0430 04/23/19  0300  04/22/19  1611   SODIUM 137  --  133*  --   --  139  --   --    POTASSIUM 4.3 3.9 4.2   < >  --  4.3  4.3  --  4.2   CHLORIDE 104  --  101  --   --  108*  --   --    CO2 28  --  25  --   --  23  --   --    BUN 20  --  22*  --   --  18  --   --    CREATININE 0.52*  --  0.83  --   --   0.73  --   --    GLUCOSE 133*  --  126*  --  112* 93  98   < >  --    ANIONGAP 9*  --  11  --   --  12  --   --    GFRNA >90  --  >90  --   --  >90  --   --    YOGESH  --   --   --   --   --   --   --  1.33*   MG 2.1  --  2.2  --   --  2.4  --  2.4    < > = values in this interval not displayed.     HEME:  # Acute blood loss anemia, post-op, expected  - Hb stable  - No overt bleeding  - Monitor Hb closely    Lab Results   Component Value Date    HGB 10.6 (L) 04/25/2019    HGB 10.4 (L) 04/24/2019    HCT 32.1 (L) 04/25/2019    PLT 99 (L) 04/25/2019    PLT 95 (L) 04/24/2019    PTT 25 04/22/2019    INR 1.0 04/23/2019    INR 1.1 04/22/2019     ID:  # Leukocytosis (improved)  - Afebrile  - BAL cultures pending: negative to date  - Monitor off Abx for now  - If starting Abx will need blood cultures prior to starting Abx    Temperature: 98.6 °F (37 °C), Temp  Min: 98.1 °F (36.7 °C)  Max: 98.8 °F (37.1 °C)  Lab Results   Component Value Date    WBC 21.4 (H) 04/25/2019    WBC 28.8 (H) 04/24/2019    WBC 24.6 (H) 04/23/2019    MRSAPC NOT DETECTED 04/11/2019   Cultures: Not Applicable    ENDO:  # Hyperglycemia  - Lispro SSI    BMI 28.25     Recent Labs   Lab 04/24/19  2146 04/25/19  0851   GLUCOSE BEDSIDE 138* 123*     Lab Results   Component Value Date    GLUCOSE 133 (H) 04/25/2019    TSH 1.030 04/11/2019     EXAM  Physical Exam   Constitutional: He is oriented to person, place, and time and well-developed, well-nourished, and in no distress. No distress.   HENT:   Head: Normocephalic and atraumatic.   Mouth/Throat: Oropharynx is clear and moist.   Eyes: EOM are normal. No scleral icterus.   Neck: Neck supple.   CVC site c/d/i   Cardiovascular: Normal rate and regular rhythm.   No murmur heard.  Pulmonary/Chest: Effort normal. He has no wheezes.   Diminished breath sounds bilaterally (right > left); no wheezing   Abdominal: Soft. He exhibits no distension. There is no tenderness.   Musculoskeletal: He exhibits edema (trace LE  edema bilaterally).   Neurological: He is alert and oriented to person, place, and time.   Interactive with answering questions; moves all 4 extremities spontaneously   Skin: Skin is warm and dry.   Vitals reviewed.    ACCS Attestation     This patient is critically ill as documented above. I evaluated the patient and reviewed imaging and laboratory data.  I provided > 35 minutes critical care services not including time allocated for procedures.    My management includes review of pertinent hemodynamic, laboratory, and imaging data; discussion of the patient with bedside nursing; titration of Optiflow settings; discussion of the patient with primary/consulting services; and formulation of a plan of care as documented above.     Stephen Bro MD  [facility]  Pager [phone]

## 2019-08-14 NOTE — TELEPHONE ENCOUNTER
Called and LM for Nehal Gallardo to return my call    She has [facility] [address]150 and she needs a Pre Cert done  X 2

## 2019-08-22 NOTE — TELEPHONE ENCOUNTER
Dr Seth Garcia called to inform you that Jeaneth's u/s is denied  They state you can do a peer to peer to provide more info at [phone]  Case # [id number]    Thanks

## 2019-08-26 ENCOUNTER — TELEPHONE (OUTPATIENT)
Dept: FAMILY MEDICINE CLINIC | Facility: CLINIC | Age: 29
End: 2019-08-26

## 2019-08-26 NOTE — TELEPHONE ENCOUNTER
Went the [facility] in [place], [place]  They throat culture and diagnosed with staph infection  They prescribed sulfameth crigeraldo  Wanted to know if that is what she should be taking

## 2019-09-09 ENCOUNTER — TELEPHONE (OUTPATIENT)
Dept: FAMILY MEDICINE CLINIC | Facility: CLINIC | Age: 29
End: 2019-09-09

## 2019-09-09 ENCOUNTER — OFFICE VISIT (OUTPATIENT)
Dept: FAMILY MEDICINE CLINIC | Facility: CLINIC | Age: 29
End: 2019-09-09
Payer: COMMERCIAL

## 2019-09-09 VITALS
BODY MASS INDEX: 28.82 KG/M2 | TEMPERATURE: 99 F | WEIGHT: 168.8 LBS | SYSTOLIC BLOOD PRESSURE: 136 MMHG | HEART RATE: 80 BPM | RESPIRATION RATE: 14 BRPM | DIASTOLIC BLOOD PRESSURE: 70 MMHG | HEIGHT: 64 IN

## 2019-09-09 DIAGNOSIS — N94.6 DYSMENORRHEA: Primary | ICD-10-CM

## 2019-09-09 DIAGNOSIS — M54.32 SCIATICA OF LEFT SIDE: ICD-10-CM

## 2019-09-09 DIAGNOSIS — N80.0 UTERUS, ADENOMYOSIS: ICD-10-CM

## 2019-09-09 DIAGNOSIS — R10.2 PELVIC PAIN: ICD-10-CM

## 2019-09-09 DIAGNOSIS — B37.3 YEAST VAGINITIS: Primary | ICD-10-CM

## 2019-09-09 PROBLEM — Z13.6 SCREENING FOR HYPERTENSION: Status: RESOLVED | Noted: 2019-08-01 | Resolved: 2019-09-09

## 2019-09-09 PROBLEM — Z13.29 SCREENING FOR HYPOTHYROIDISM: Status: RESOLVED | Noted: 2019-08-01 | Resolved: 2019-09-09

## 2019-09-09 PROBLEM — Z13.220 SCREENING FOR HYPERLIPIDEMIA: Status: RESOLVED | Noted: 2019-08-01 | Resolved: 2019-09-09

## 2019-09-09 PROBLEM — Z00.00 ROUTINE GENERAL MEDICAL EXAMINATION AT A HEALTH CARE FACILITY: Status: RESOLVED | Noted: 2019-08-01 | Resolved: 2019-09-09

## 2019-09-09 PROCEDURE — 99213 OFFICE O/P EST LOW 20 MIN: CPT | Performed by: FAMILY MEDICINE

## 2019-09-09 RX ORDER — FLUTICASONE PROPIONATE 50 MCG
2 SPRAY, SUSPENSION (ML) NASAL AS NEEDED
Refills: 0 | COMMUNITY
Start: 2019-09-01

## 2019-09-09 RX ORDER — FAMOTIDINE 40 MG/1
40 TABLET, FILM COATED ORAL DAILY
COMMUNITY
End: 2020-03-17 | Stop reason: ALTCHOICE

## 2019-09-09 RX ORDER — FLUCONAZOLE 150 MG/1
TABLET ORAL
Qty: 2 TABLET | Refills: 3 | Status: SHIPPED | OUTPATIENT
Start: 2019-09-09 | End: 2019-09-12

## 2019-09-09 NOTE — PROGRESS NOTES
Assessment/Plan:    No problem-specific Assessment & Plan notes found for this encounter  Diagnoses and all orders for this visit:    Dysmenorrhea  -     US pelvis complete non OB; Future    Uterus, adenomyosis  -     US pelvis complete non OB; Future    Pelvic pain  -     US pelvis complete non OB; Future    Sciatica of left side  -     Ambulatory referral to Physical Therapy; Future    Other orders  -     fluticasone (FLONASE) 50 mcg/act nasal spray; INSTILL 2 SPRAYS IEN QD  -     famotidine (PEPCID) 40 MG tablet; Take 40 mg by mouth daily          Patient Instructions   MONITOR SYMPTOMS  TRIAL OF PHYSICAL THERAPY FOR SCIATIC DISCOMFORT    PELVIC US  MAY CONSIDER TRIAL OF OC FOR POSSIBLE ENDOMETRIOSIS    FURTHER PLANS PENDING RESPONSE TO TREATMENT AND US RESULTS      Return for Next scheduled follow up  Subjective:      Patient ID: Pawel Garcia is a 34 y o  female  Chief Complaint   Patient presents with    Lt calf pain     intermitent pain - sometimes foot gets tingly, sometimes pain shoots to buttock    Abdominal Pain     Rt side of belly button - lasted a couple days - (LMP: 9/5/19, still having spotting)       PATIENT RETURNS  COMPLAINS OF SEVERAL ISSUES    L CALF PAIN  ON AND OFF  MORE FREQ LATELY  PAIN LASTS SECONDS TO MINUTES  IS NOT PALPABLE  NOTES NO SWELLING OR REDNESS  OCC GETS SOME NUMBNESS IN L FOOT AND SOME OCC DISCOMFORT IN L BUTTOCK REGION  NO HX OF TRAUMA  DENIES ANY CP, SOB, PALPITATIONS    PELVIC DISCOMFORT  PERIOD DISCOMFORT GETTING WORSE  HAS HAD HX OF ADENOMYOSIS  NO VAGINAL DISCHARGE  DENIES ANY URINARY FREQ OR BURNING      The following portions of the patient's history were reviewed and updated as appropriate: allergies, current medications, past family history, past medical history, past social history, past surgical history and problem list     Review of Systems   Constitutional: Negative for fever  HENT: Negative for congestion and sore throat      Eyes: Negative for discharge  Respiratory: Negative for chest tightness  Cardiovascular: Negative for chest pain, palpitations and leg swelling  Gastrointestinal: Negative for abdominal pain, diarrhea, nausea and vomiting  Genitourinary: Positive for menstrual problem, pelvic pain and vaginal bleeding  Negative for difficulty urinating, dyspareunia, dysuria, enuresis, flank pain, frequency, genital sores, hematuria, urgency, vaginal discharge and vaginal pain  Musculoskeletal: Negative for arthralgias and joint swelling  Neurological: Negative for numbness  Psychiatric/Behavioral: The patient is not nervous/anxious            Current Outpatient Medications   Medication Sig Dispense Refill    ALPRAZolam (XANAX) 0 25 mg tablet Take 1 tablet (0 25 mg total) by mouth 2 (two) times a day as needed for anxiety 30 tablet 0    Alum & Mag Hydroxide-Simeth (MYLANTA PO) Take 10 mL by mouth as needed (couple times per week)       famotidine (PEPCID) 40 MG tablet Take 40 mg by mouth daily      fluticasone (FLONASE) 50 mcg/act nasal spray INSTILL 2 SPRAYS IEN QD  0    naproxen (EC NAPROSYN) 375 MG TBEC Take 1 tablet (375 mg total) by mouth 3 (three) times a day with meals (Patient taking differently: Take 375 mg by mouth as needed ) 60 each 0    oxyCODONE-acetaminophen (PERCOCET) 5-325 mg per tablet Take 1 tablet by mouth every 6 (six) hours as needed for moderate painMax Daily Amount: 4 tablets 30 tablet 0    RaNITidine HCl (ZANTAC 75 PO) Take by mouth as needed      omeprazole (PriLOSEC) 40 MG capsule Take 1 capsule (40 mg total) by mouth 2 (two) times a day (Patient not taking: Reported on 9/9/2019) 60 capsule 1    sucralfate (CARAFATE) 1 g tablet Take 1 tablet (1 g total) by mouth 4 (four) times a day (Patient not taking: Reported on 8/1/2019) 40 tablet 0    valACYclovir (VALTREX) 1,000 mg tablet Take 1 tablet (1,000 mg total) by mouth 3 (three) times a day for 7 days (Patient not taking: Reported on 8/1/2019) 21 tablet 0     No current facility-administered medications for this visit  Objective:    /70 (BP Location: Right arm, Patient Position: Sitting, Cuff Size: Standard)   Pulse 80   Temp 99 °F (37 2 °C) (Temporal)   Resp 14   Ht 5' 4" (1 626 m)   Wt 76 6 kg (168 lb 12 8 oz)   BMI 28 97 kg/m²        Physical Exam   Constitutional: She is oriented to person, place, and time  She appears well-developed and well-nourished  HENT:   Head: Normocephalic and atraumatic  Eyes: Pupils are equal, round, and reactive to light  Conjunctivae and EOM are normal  Right eye exhibits no discharge  Left eye exhibits no discharge  Neck: Normal range of motion  Neck supple  No thyromegaly present  Cardiovascular: Normal rate, regular rhythm and normal heart sounds  No murmur heard  Pulmonary/Chest: Effort normal and breath sounds normal  No respiratory distress  She has no wheezes  She has no rales  Abdominal: Soft  Bowel sounds are normal  There is tenderness  SOFT  BS PRESENT  NO HSM  NO MASSES  MILD PELVIC TENDERNESS  NO GUARDING OR REBOUND   Musculoskeletal: Normal range of motion  She exhibits no edema or tenderness  Lymphadenopathy:     She has no cervical adenopathy  Neurological: She is alert and oriented to person, place, and time  Skin: Skin is warm and dry  No rash noted  No erythema  Psychiatric: She has a normal mood and affect   Her behavior is normal  Judgment and thought content normal               Arnie Aguirre MD

## 2019-09-09 NOTE — PATIENT INSTRUCTIONS
MONITOR SYMPTOMS  TRIAL OF PHYSICAL THERAPY FOR SCIATIC DISCOMFORT    PELVIC US  MAY CONSIDER TRIAL OF OC FOR POSSIBLE ENDOMETRIOSIS    FURTHER PLANS PENDING RESPONSE TO TREATMENT AND US RESULTS

## 2019-09-09 NOTE — TELEPHONE ENCOUNTER
Forgot to ask you while here for ov, was taking an antibiotic that urgent care called in for her last week but now she feels like she has a yeast infection    Can you call in rx to   [facility] to tramadol, prednisone and vicodin

## 2019-10-18 ENCOUNTER — OFFICE VISIT (OUTPATIENT)
Dept: FAMILY MEDICINE CLINIC | Facility: CLINIC | Age: 29
End: 2019-10-18
Payer: COMMERCIAL

## 2019-10-18 VITALS
OXYGEN SATURATION: 98 % | DIASTOLIC BLOOD PRESSURE: 70 MMHG | SYSTOLIC BLOOD PRESSURE: 130 MMHG | HEART RATE: 95 BPM | WEIGHT: 169.4 LBS | RESPIRATION RATE: 16 BRPM | BODY MASS INDEX: 28.22 KG/M2 | HEIGHT: 65 IN | TEMPERATURE: 98.8 F

## 2019-10-18 DIAGNOSIS — G43.909 MIGRAINE WITHOUT STATUS MIGRAINOSUS, NOT INTRACTABLE, UNSPECIFIED MIGRAINE TYPE: ICD-10-CM

## 2019-10-18 DIAGNOSIS — F41.9 ANXIETY: ICD-10-CM

## 2019-10-18 DIAGNOSIS — N64.4 BREAST TENDERNESS IN FEMALE: Primary | ICD-10-CM

## 2019-10-18 LAB — SL AMB POCT URINE HCG: NORMAL

## 2019-10-18 PROCEDURE — 81025 URINE PREGNANCY TEST: CPT | Performed by: FAMILY MEDICINE

## 2019-10-18 PROCEDURE — 3008F BODY MASS INDEX DOCD: CPT | Performed by: FAMILY MEDICINE

## 2019-10-18 PROCEDURE — 99213 OFFICE O/P EST LOW 20 MIN: CPT | Performed by: FAMILY MEDICINE

## 2019-10-18 RX ORDER — TOPIRAMATE 25 MG/1
TABLET ORAL
Qty: 75 TABLET | Refills: 0 | Status: SHIPPED | OUTPATIENT
Start: 2019-10-18 | End: 2019-11-26 | Stop reason: ALTCHOICE

## 2019-10-18 NOTE — PROGRESS NOTES
Assessment/Plan:    No problem-specific Assessment & Plan notes found for this encounter  Diagnoses and all orders for this visit:    Breast tenderness in female  -     POCT urine HCG    Migraine without status migrainosus, not intractable, unspecified migraine type  -     topiramate (TOPAMAX) 25 mg tablet; TAKE 1 TAB PO AT HS X 3 DAYS THEN 1 TAB PO BID X 3 DAYS THEN 1 TAB IN THE AM  AND 2 TABS AT HS X 3 DAYS  THEN 2 TABS PO BID X 14 DAYS    Anxiety          Patient Instructions   CONTINUE CURRENT MANAGEMENT    TRIAL OF TOPAMAX FOR HER INCREASED MIGRAINE FREQ  HYDRATION  ADEQUATE SLEEP    WILL MONITOR BREAST TENDERNESS THROUGH HER NEXT CYCLE  VIT E  MAY NEED TO CONSIDER OC  MAY NEED TO REPEAT PELVIC US    CALL IN 3 WEEKS WITH UPDATE, SOONER PRN      Return if symptoms worsen or fail to improve, for Next scheduled follow up  Subjective:      Patient ID: Gudelia Dsouza is a 34 y o  female  Chief Complaint   Patient presents with    Migraine    Pain     Lt lateral breast & rib    not feeling well     nausea, Loss appetite, loose stools, light headed,  PATIENT RETURNS FOR SEVERAL ISSUES    MIGRAINE HA HAVE INCREASED IN FREQ  NO CHANGE IN CHARACTER  ADVIL / TYLENOL HELP, BUT DOES NOT LAST  NO VD  SOME NAUSEA    L CHEST / BREAST TENDERNESS  JUST FINISHED PERIOD  DOES NOT FEEL ANY ABNORMALITY  NO NIPPLE DISCHARGE          The following portions of the patient's history were reviewed and updated as appropriate: allergies, current medications, past family history, past medical history, past social history, past surgical history and problem list     Review of Systems   Constitutional: Negative for chills, fatigue and fever  HENT: Negative for congestion, ear discharge, ear pain, mouth sores, postnasal drip, sore throat and trouble swallowing  Eyes: Negative for pain, discharge and visual disturbance  Respiratory: Negative for cough, shortness of breath and wheezing      Cardiovascular: Negative for chest pain, palpitations and leg swelling  Gastrointestinal: Positive for nausea  Negative for abdominal distention, abdominal pain, blood in stool and diarrhea  Endocrine: Negative for polydipsia, polyphagia and polyuria  Genitourinary: Negative for decreased urine volume, dysuria, frequency, hematuria, pelvic pain, urgency, vaginal bleeding, vaginal discharge and vaginal pain  Musculoskeletal: Negative for arthralgias, gait problem and joint swelling  Skin: Negative for pallor and rash  Neurological: Positive for headaches  Negative for dizziness, syncope, speech difficulty, weakness, light-headedness and numbness  Hematological: Negative for adenopathy  Psychiatric/Behavioral: Negative for behavioral problems, confusion and sleep disturbance  The patient is not nervous/anxious            Current Outpatient Medications   Medication Sig Dispense Refill    ALPRAZolam (XANAX) 0 25 mg tablet Take 1 tablet (0 25 mg total) by mouth 2 (two) times a day as needed for anxiety 30 tablet 0    Alum & Mag Hydroxide-Simeth (MYLANTA PO) Take 10 mL by mouth as needed (couple times per week)       famotidine (PEPCID) 40 MG tablet Take 40 mg by mouth daily      fluticasone (FLONASE) 50 mcg/act nasal spray INSTILL 2 SPRAYS IEN QD  0    naproxen (EC NAPROSYN) 375 MG TBEC Take 1 tablet (375 mg total) by mouth 3 (three) times a day with meals (Patient taking differently: Take 375 mg by mouth as needed ) 60 each 0    oxyCODONE-acetaminophen (PERCOCET) 5-325 mg per tablet Take 1 tablet by mouth every 6 (six) hours as needed for moderate painMax Daily Amount: 4 tablets 30 tablet 0    RaNITidine HCl (ZANTAC 75 PO) Take by mouth as needed      omeprazole (PriLOSEC) 40 MG capsule Take 1 capsule (40 mg total) by mouth 2 (two) times a day (Patient not taking: Reported on 9/9/2019) 60 capsule 1    sucralfate (CARAFATE) 1 g tablet Take 1 tablet (1 g total) by mouth 4 (four) times a day (Patient not taking: Reported on 8/1/2019) 40 tablet 0    topiramate (TOPAMAX) 25 mg tablet TAKE 1 TAB PO AT HS X 3 DAYS THEN 1 TAB PO BID X 3 DAYS THEN 1 TAB IN THE AM  AND 2 TABS AT HS X 3 DAYS  THEN 2 TABS PO BID X 14 DAYS 75 tablet 0    valACYclovir (VALTREX) 1,000 mg tablet Take 1 tablet (1,000 mg total) by mouth 3 (three) times a day for 7 days (Patient not taking: Reported on 8/1/2019) 21 tablet 0     No current facility-administered medications for this visit  Objective:    /70 (BP Location: Right arm, Patient Position: Sitting, Cuff Size: Standard)   Pulse 95   Temp 98 8 °F (37 1 °C) (Temporal)   Resp 16   Ht 5' 5" (1 651 m)   Wt 76 8 kg (169 lb 6 4 oz)   SpO2 98%   BMI 28 19 kg/m²        Physical Exam   Constitutional: She is oriented to person, place, and time  She appears well-developed and well-nourished  HENT:   Head: Normocephalic and atraumatic  Eyes: Pupils are equal, round, and reactive to light  Conjunctivae and EOM are normal  Right eye exhibits no discharge  Left eye exhibits no discharge  Neck: Normal range of motion  Neck supple  No thyromegaly present  Cardiovascular: Normal rate, regular rhythm and normal heart sounds  No murmur heard  Pulmonary/Chest: Effort normal and breath sounds normal  No respiratory distress  She has no wheezes  She has no rales  Abdominal: Soft  Bowel sounds are normal  There is no tenderness  Musculoskeletal: Normal range of motion  She exhibits no edema or tenderness  Lymphadenopathy:     She has no cervical adenopathy  Neurological: She is alert and oriented to person, place, and time  Skin: Skin is warm and dry  No rash noted  No erythema  Psychiatric: She has a normal mood and affect   Her behavior is normal  Judgment and thought content normal               Claire Soliz MD

## 2019-10-18 NOTE — PATIENT INSTRUCTIONS
CONTINUE CURRENT MANAGEMENT    TRIAL OF TOPAMAX FOR HER INCREASED MIGRAINE FREQ  HYDRATION  ADEQUATE SLEEP    WILL MONITOR BREAST TENDERNESS THROUGH HER NEXT CYCLE  VIT E  MAY NEED TO CONSIDER OC  MAY NEED TO REPEAT PELVIC US    CALL IN 3 WEEKS WITH UPDATE, SOONER PRN

## 2019-10-28 DIAGNOSIS — F41.9 ANXIETY: ICD-10-CM

## 2019-10-28 RX ORDER — ALPRAZOLAM 0.25 MG/1
TABLET ORAL
Qty: 30 TABLET | Refills: 0 | Status: SHIPPED | OUTPATIENT
Start: 2019-10-28 | End: 2020-03-12

## 2019-11-07 ENCOUNTER — TELEPHONE (OUTPATIENT)
Dept: FAMILY MEDICINE CLINIC | Facility: CLINIC | Age: 29
End: 2019-11-07

## 2019-11-07 NOTE — TELEPHONE ENCOUNTER
A couple weeks ago she had an US done and it should that she had a cyst on her right ovary  She is experiencing sharp shooting pain  It is not unbearable but it hurts  No fever, no chills, just feeling nauseous    She wanted to know if she should go to the ER or when should she?    I informed her that you were not available this morning and when you answer we would call her right away    But if the pain gets to bad, I advised her to go to the ER

## 2019-11-07 NOTE — TELEPHONE ENCOUNTER
Karsten Beauchamp called back - she now has started some bleeding  Not a lot  Her period was last week  I informed her that [facility] said that if this is not her normal, she should go to the emergency room  They will be able to do an emergency CT

## 2019-11-08 ENCOUNTER — TELEPHONE (OUTPATIENT)
Dept: FAMILY MEDICINE CLINIC | Facility: CLINIC | Age: 29
End: 2019-11-08

## 2019-11-08 NOTE — TELEPHONE ENCOUNTER
Kathi Max called back  She believes her cyst burst Wed night, but she still feeling lower back pain and pelvic cramping  Is that normal to feel that way a few days after? Also should she get an u/s ordered?   Please advise Kathi Max [phone]

## 2019-11-08 NOTE — TELEPHONE ENCOUNTER
Relayed Fadumo's message to Prosper Louise  She said that she is doing ok  Pain is not that bad  Not much of an appetite though  I asked if she was going to go to the ER and she is unsure    She said maybe she will go in a little while

## 2019-11-09 NOTE — TELEPHONE ENCOUNTER
If she does not go the ER this weekend, can she be seen by either myself or Dr Mackey Dural this upcoming week?

## 2019-11-12 ENCOUNTER — TELEPHONE (OUTPATIENT)
Dept: FAMILY MEDICINE CLINIC | Facility: CLINIC | Age: 29
End: 2019-11-12

## 2019-11-12 NOTE — TELEPHONE ENCOUNTER
Willie Bob did go to the ER on Friday  They did an CT and noted that the cyst is no longer there  Feeling definitely better  A little spotting still

## 2019-11-12 NOTE — TELEPHONE ENCOUNTER
Noted  If spotting continues, she may want to consider have speculum exam or some kind of follow up for her symptoms

## 2019-11-13 NOTE — TELEPHONE ENCOUNTER
Is still having some spotting  Prosper Louise said that is not unusual for her  She wanted to know if she came in to see you, what are you going to be able to do for her? She has had so many tests done    DOES SHE HAVE TO COME IN?

## 2019-11-14 NOTE — TELEPHONE ENCOUNTER
I am unsure of what she is asking  My recommendation is the same as above  To be more specific, if her spotting continues over 2-3 weeks or if she has any recurrence of pelvic pain, she should have PAP smear completed either with us or GYN  But I do believe this is all r/t her cyst or possible rupture of cyst  Her symptoms should resolve on their own  I will CC Dr Jaimee Fung on this message, as I have never Robert Kramer and he initially saw her for pelvic pain symptoms in September

## 2019-11-14 NOTE — TELEPHONE ENCOUNTER
Scheduled an appointment for 11/26 with Dr Diana Quezada  She is feeling better - still a little spotting but not enough to wear a pad

## 2019-11-26 ENCOUNTER — OFFICE VISIT (OUTPATIENT)
Dept: FAMILY MEDICINE CLINIC | Facility: CLINIC | Age: 29
End: 2019-11-26
Payer: COMMERCIAL

## 2019-11-26 VITALS
RESPIRATION RATE: 16 BRPM | TEMPERATURE: 98.9 F | BODY MASS INDEX: 27.59 KG/M2 | HEIGHT: 65 IN | WEIGHT: 165.6 LBS | SYSTOLIC BLOOD PRESSURE: 134 MMHG | HEART RATE: 88 BPM | DIASTOLIC BLOOD PRESSURE: 80 MMHG

## 2019-11-26 DIAGNOSIS — N94.6 DYSMENORRHEA: Primary | ICD-10-CM

## 2019-11-26 DIAGNOSIS — Z30.011 OCP (ORAL CONTRACEPTIVE PILLS) INITIATION: ICD-10-CM

## 2019-11-26 PROCEDURE — 99213 OFFICE O/P EST LOW 20 MIN: CPT | Performed by: FAMILY MEDICINE

## 2019-11-26 PROCEDURE — 1036F TOBACCO NON-USER: CPT | Performed by: FAMILY MEDICINE

## 2019-11-26 RX ORDER — LEVONORGESTREL AND ETHINYL ESTRADIOL 0.15-0.03
1 KIT ORAL DAILY
Qty: 28 TABLET | Refills: 3 | Status: SHIPPED | OUTPATIENT
Start: 2019-11-26 | End: 2020-01-17 | Stop reason: ALTCHOICE

## 2019-11-26 NOTE — PATIENT INSTRUCTIONS
DISCUSSED ISSUES  WILL PLACE PATIENT ON A TRIAL OF OC  RE CHECK IN 6 M  REPEAT US OF THE PELVIS THEN    CALL SOONER PRN

## 2019-11-26 NOTE — PROGRESS NOTES
Assessment/Plan:    No problem-specific Assessment & Plan notes found for this encounter  Diagnoses and all orders for this visit:    Dysmenorrhea  -     levonorgestrel-ethinyl estradiol (NORDETTE) 0 15-30 MG-MCG per tablet; Take 1 tablet by mouth daily    OCP (oral contraceptive pills) initiation  -     levonorgestrel-ethinyl estradiol (NORDETTE) 0 15-30 MG-MCG per tablet; Take 1 tablet by mouth daily          Patient Instructions   DISCUSSED ISSUES  WILL PLACE PATIENT ON A TRIAL OF OC  RE CHECK IN 6 M  REPEAT US OF THE PELVIS THEN    CALL SOONER PRN      Return in about 6 months (around 5/26/2020) for Recheck  Subjective:      Patient ID: Jean Feliciano is a 34 y o  female      Chief Complaint   Patient presents with    Ovarian Cyst     update    Medication Management     Refills    Immunizations     Hep A - discuss regard to [place] - pt was NOT shoping there during the timeframe    HPV vaccine     discuss       DISCUSSION    REVIEWED RECENT ER VISIT FOR PELVIC PAIN  DX - RUPTURED OVARIAN CYST  PERIODS HAVE BEEN SL IRREGULAR    REVIEWED THERAPEUTIC OPTIONS    DISCUSSED RISKS AND BENEFITS OF ORAL CONTRACEPTIVE USE FOR HER ISSUES          The following portions of the patient's history were reviewed and updated as appropriate: allergies, current medications, past family history, past medical history, past social history, past surgical history and problem list     Review of Systems      Current Outpatient Medications   Medication Sig Dispense Refill    ALPRAZolam (XANAX) 0 25 mg tablet TAKE 1 TABLET(0 25 MG) BY MOUTH TWICE DAILY AS NEEDED FOR ANXIETY 30 tablet 0    Alum & Mag Hydroxide-Simeth (MYLANTA PO) Take 10 mL by mouth as needed (couple times per week)       famotidine (PEPCID) 40 MG tablet Take 40 mg by mouth daily      fluticasone (FLONASE) 50 mcg/act nasal spray INSTILL 2 SPRAYS IEN QD  0    naproxen (EC NAPROSYN) 375 MG TBEC Take 1 tablet (375 mg total) by mouth 3 (three) times a day with meals (Patient taking differently: Take 375 mg by mouth as needed ) 60 each 0    oxyCODONE-acetaminophen (PERCOCET) 5-325 mg per tablet Take 1 tablet by mouth every 6 (six) hours as needed for moderate painMax Daily Amount: 4 tablets 30 tablet 0    RaNITidine HCl (ZANTAC 75 PO) Take by mouth as needed      valACYclovir (VALTREX) 1,000 mg tablet Take 1 tablet (1,000 mg total) by mouth 3 (three) times a day for 7 days 21 tablet 0    levonorgestrel-ethinyl estradiol (NORDETTE) 0 15-30 MG-MCG per tablet Take 1 tablet by mouth daily 28 tablet 3     No current facility-administered medications for this visit          Objective:    /80   Pulse 88   Temp 98 9 °F (37 2 °C) (Temporal)   Resp 16   Ht 5' 4 5" (1 638 m)   Wt 75 1 kg (165 lb 9 6 oz)   BMI 27 99 kg/m²        Physical Exam       NO PE WAS PERFORMED    LENGTH OF VISIT 20 MIN  LENGTH OF  20 MIN    Erika German MD

## 2019-11-27 DIAGNOSIS — B00.9 HERPES SIMPLEX: ICD-10-CM

## 2019-11-27 DIAGNOSIS — B37.3 YEAST VAGINITIS: Primary | ICD-10-CM

## 2019-11-27 RX ORDER — VALACYCLOVIR HYDROCHLORIDE 1 G/1
TABLET, FILM COATED ORAL
Qty: 21 TABLET | Refills: 0 | Status: SHIPPED | OUTPATIENT
Start: 2019-11-27 | End: 2020-08-11

## 2019-11-27 RX ORDER — FLUCONAZOLE 150 MG/1
TABLET ORAL
Qty: 2 TABLET | Refills: 3 | Status: SHIPPED | OUTPATIENT
Start: 2019-11-27 | End: 2020-12-09

## 2019-12-09 DIAGNOSIS — G43.909 MIGRAINE WITHOUT STATUS MIGRAINOSUS, NOT INTRACTABLE, UNSPECIFIED MIGRAINE TYPE: Primary | ICD-10-CM

## 2019-12-09 RX ORDER — ELETRIPTAN HYDROBROMIDE 40 MG/1
40 TABLET, FILM COATED ORAL ONCE AS NEEDED
Qty: 6 TABLET | Refills: 4 | Status: SHIPPED | OUTPATIENT
Start: 2019-12-09 | End: 2019-12-10

## 2019-12-10 ENCOUNTER — TELEPHONE (OUTPATIENT)
Dept: FAMILY MEDICINE CLINIC | Facility: CLINIC | Age: 29
End: 2019-12-10

## 2019-12-10 DIAGNOSIS — G43.909 MIGRAINE WITHOUT STATUS MIGRAINOSUS, NOT INTRACTABLE, UNSPECIFIED MIGRAINE TYPE: Primary | ICD-10-CM

## 2019-12-10 RX ORDER — SUMATRIPTAN 100 MG/1
TABLET, FILM COATED ORAL
Qty: 10 TABLET | Refills: 4 | Status: SHIPPED | OUTPATIENT
Start: 2019-12-10 | End: 2020-01-03 | Stop reason: SDUPTHER

## 2019-12-10 NOTE — TELEPHONE ENCOUNTER
Eletriptan 40mg is not covered  Plan does cover Sumatriptan  [place] [facility] [place]

## 2020-01-03 ENCOUNTER — TELEPHONE (OUTPATIENT)
Dept: FAMILY MEDICINE CLINIC | Facility: CLINIC | Age: 30
End: 2020-01-03

## 2020-01-03 DIAGNOSIS — G43.909 MIGRAINE WITHOUT STATUS MIGRAINOSUS, NOT INTRACTABLE, UNSPECIFIED MIGRAINE TYPE: ICD-10-CM

## 2020-01-03 RX ORDER — SUMATRIPTAN 50 MG/1
TABLET, FILM COATED ORAL
Qty: 10 TABLET | Refills: 3 | Status: SHIPPED | OUTPATIENT
Start: 2020-01-03 | End: 2020-03-17 | Stop reason: ALTCHOICE

## 2020-01-03 NOTE — TELEPHONE ENCOUNTER
You prescribed imetrx for her headaches  States that it felt like her throat/neck was burning  She was wondering if she could have a lower dosage      Uses [facility] in [place]

## 2020-01-17 ENCOUNTER — OFFICE VISIT (OUTPATIENT)
Dept: FAMILY MEDICINE CLINIC | Facility: CLINIC | Age: 30
End: 2020-01-17
Payer: COMMERCIAL

## 2020-01-17 VITALS
OXYGEN SATURATION: 98 % | WEIGHT: 169.8 LBS | HEART RATE: 80 BPM | BODY MASS INDEX: 28.99 KG/M2 | RESPIRATION RATE: 14 BRPM | DIASTOLIC BLOOD PRESSURE: 80 MMHG | HEIGHT: 64 IN | TEMPERATURE: 98.9 F | SYSTOLIC BLOOD PRESSURE: 130 MMHG

## 2020-01-17 DIAGNOSIS — G43.909 MIGRAINE WITHOUT STATUS MIGRAINOSUS, NOT INTRACTABLE, UNSPECIFIED MIGRAINE TYPE: ICD-10-CM

## 2020-01-17 DIAGNOSIS — R10.13 DYSPEPSIA: ICD-10-CM

## 2020-01-17 DIAGNOSIS — R51.9 ACUTE NONINTRACTABLE HEADACHE, UNSPECIFIED HEADACHE TYPE: Primary | ICD-10-CM

## 2020-01-17 DIAGNOSIS — N94.6 DYSMENORRHEA: ICD-10-CM

## 2020-01-17 PROBLEM — R10.2 PELVIC PAIN: Status: RESOLVED | Noted: 2019-09-09 | Resolved: 2020-01-17

## 2020-01-17 PROBLEM — N64.4 BREAST TENDERNESS IN FEMALE: Status: RESOLVED | Noted: 2019-10-18 | Resolved: 2020-01-17

## 2020-01-17 PROCEDURE — 1036F TOBACCO NON-USER: CPT | Performed by: FAMILY MEDICINE

## 2020-01-17 PROCEDURE — 99214 OFFICE O/P EST MOD 30 MIN: CPT | Performed by: FAMILY MEDICINE

## 2020-01-17 PROCEDURE — 3008F BODY MASS INDEX DOCD: CPT | Performed by: FAMILY MEDICINE

## 2020-01-17 PROCEDURE — 3725F SCREEN DEPRESSION PERFORMED: CPT | Performed by: FAMILY MEDICINE

## 2020-01-17 RX ORDER — AMITRIPTYLINE HYDROCHLORIDE 10 MG/1
10 TABLET, FILM COATED ORAL
Qty: 30 TABLET | Refills: 1 | Status: SHIPPED | OUTPATIENT
Start: 2020-01-17 | End: 2020-03-17 | Stop reason: ALTCHOICE

## 2020-01-17 NOTE — PROGRESS NOTES
BMI Counseling: Body mass index is 29 15 kg/m²  The BMI is above normal  Nutrition recommendations include encouraging healthy choices of fruits and vegetables and moderation in carbohydrate intake  Exercise recommendations include exercising 3-5 times per week  No pharmacotherapy was ordered  Assessment/Plan:    No problem-specific Assessment & Plan notes found for this encounter  Diagnoses and all orders for this visit:    Acute nonintractable headache, unspecified headache type  -     MRI brain w wo contrast; Future    Migraine without status migrainosus, not intractable, unspecified migraine type  -     MRI brain w wo contrast; Future  -     amitriptyline (ELAVIL) 10 mg tablet; Take 1 tablet (10 mg total) by mouth daily at bedtime    Dyspepsia  -     esomeprazole (NEXIUM) 20 mg capsule; Take 1 capsule (20 mg total) by mouth daily in the early morning    Dysmenorrhea          Patient Instructions   CONTINUE CURRENT TREATMENT PLAN  HEADACHE DIARY    MRI SINCE NATURE OF HEADACHES ARE CHANGING  TRIAL OF ELAVIL  [address] 60 GERD    CALL IN 2 WEEKS WITH UPDATE, SOONER PRN      No follow-ups on file  Subjective:      Patient ID: Isidra Triplett is a 34 y o  female      Chief Complaint   Patient presents with    Migraine    Heartburn       PATIENT RETURNS FOR SEVERAL ISSUES    HA  HEADACHES SEEMED TO BE MORE INTENSE AND MORE FREQUENT  SHARP  OCC ASSOCIATED WITH NAUSEA AND PHOTOPHOBIA  NO NECK STIFFNESS  NO RECENT ILLNESS  DENIES ANY NUMBNESS, TINGLING OR WEAKNESS  MEDICATION THAT USED TO BE EFFECTIVE - NOT AS EFFECTIVE NOW    GERD  WORSENING GERD RECENTLY  DISCUSSED CAFFEINE INTAKE  PATIENT USES A LOT OF EXCEDRIN MIGRAINE  DENIES ANY MELENA OR HEMATOCHEZIA  DISCUSSED STRATEGIES    DYSMENORRHEA  IMPROVED LATELY  USES PERCOCET VERY OCCASIONALLY  CYCLES RELATIVELY NORMAL      The following portions of the patient's history were reviewed and updated as appropriate: allergies, current medications, past family history, past medical history, past social history, past surgical history and problem list     Review of Systems   Constitutional: Negative for chills, fatigue and fever  HENT: Negative for congestion, ear discharge, ear pain, mouth sores, postnasal drip, sore throat and trouble swallowing  Eyes: Positive for photophobia  Negative for pain, discharge and visual disturbance  Respiratory: Negative for cough, shortness of breath and wheezing  Cardiovascular: Negative for chest pain, palpitations and leg swelling  Gastrointestinal: Positive for nausea  Negative for abdominal distention, abdominal pain, blood in stool and diarrhea  Endocrine: Negative for polydipsia, polyphagia and polyuria  Genitourinary: Negative for dysuria, frequency, hematuria and urgency  Musculoskeletal: Negative for arthralgias, gait problem and joint swelling  Skin: Negative for pallor and rash  Neurological: Positive for headaches  Negative for dizziness, syncope, speech difficulty, weakness, light-headedness and numbness  Hematological: Negative for adenopathy  Psychiatric/Behavioral: Negative for behavioral problems, confusion and sleep disturbance  The patient is not nervous/anxious            Current Outpatient Medications   Medication Sig Dispense Refill    ALPRAZolam (XANAX) 0 25 mg tablet TAKE 1 TABLET(0 25 MG) BY MOUTH TWICE DAILY AS NEEDED FOR ANXIETY 30 tablet 0    Alum & Mag Hydroxide-Simeth (MYLANTA PO) Take 10 mL by mouth as needed (couple times per week)       famotidine (PEPCID) 40 MG tablet Take 40 mg by mouth daily      fluticasone (FLONASE) 50 mcg/act nasal spray 2 sprays into each nostril as needed   0    oxyCODONE-acetaminophen (PERCOCET) 5-325 mg per tablet Take 1 tablet by mouth every 6 (six) hours as needed for moderate painMax Daily Amount: 4 tablets 30 tablet 0    SUMAtriptan (IMITREX) 50 mg tablet TAKE 1 TAB ON ONSET OF HEADACHE MAY REPEAT 2 HRS LATER IF HA STILL PRESENT 10 tablet 3    valACYclovir (VALTREX) 1,000 mg tablet TAKE 1 TABLET(1000 MG) BY MOUTH THREE TIMES DAILY FOR 7 DAYS (Patient taking differently: as needed ) 21 tablet 0    amitriptyline (ELAVIL) 10 mg tablet Take 1 tablet (10 mg total) by mouth daily at bedtime 30 tablet 1    esomeprazole (NEXIUM) 20 mg capsule Take 1 capsule (20 mg total) by mouth daily in the early morning 30 capsule 5     No current facility-administered medications for this visit  Objective:    /80   Pulse 80   Temp 98 9 °F (37 2 °C) (Temporal)   Resp 14   Ht 5' 4" (1 626 m)   Wt 77 kg (169 lb 12 8 oz)   SpO2 98%   BMI 29 15 kg/m²        Physical Exam   Constitutional: She is oriented to person, place, and time  She appears well-developed and well-nourished  HENT:   Head: Normocephalic and atraumatic  Eyes: Pupils are equal, round, and reactive to light  Conjunctivae and EOM are normal  Right eye exhibits no discharge  Left eye exhibits no discharge  Neck: Normal range of motion  Neck supple  No thyromegaly present  Cardiovascular: Normal rate, regular rhythm and normal heart sounds  No murmur heard  Pulmonary/Chest: Effort normal and breath sounds normal  No respiratory distress  She has no wheezes  She has no rales  Abdominal: Soft  Bowel sounds are normal  There is no tenderness  Musculoskeletal: Normal range of motion  She exhibits no edema or tenderness  Lymphadenopathy:     She has no cervical adenopathy  Neurological: She is alert and oriented to person, place, and time  She displays normal reflexes  No cranial nerve deficit or sensory deficit  She exhibits normal muscle tone  Coordination normal    Skin: Skin is warm and dry  No rash noted  No erythema  Psychiatric: She has a normal mood and affect   Her behavior is normal  Judgment and thought content normal               Yousuf Alcantara MD

## 2020-01-17 NOTE — PATIENT INSTRUCTIONS
CONTINUE CURRENT TREATMENT PLAN  HEADACHE DIARY    MRI SINCE NATURE OF HEADACHES ARE CHANGING  TRIAL OF ELAVIL  [address] FOR GERD    CALL IN 2 WEEKS WITH UPDATE, SOONER PRN

## 2020-01-20 ENCOUNTER — TELEPHONE (OUTPATIENT)
Dept: FAMILY MEDICINE CLINIC | Facility: CLINIC | Age: 30
End: 2020-01-20

## 2020-01-20 NOTE — TELEPHONE ENCOUNTER
LM for pt to cb, Where is patient going for MRI? Prior [place] was not started on [employer] because location is needed

## 2020-01-20 NOTE — TELEPHONE ENCOUNTER
[facility], [facility]  Radiology  [facility], [facility] [address]# [phone] fax# [phone]

## 2020-01-31 NOTE — TELEPHONE ENCOUNTER
Spoke to Hermann De - informed her that her insurance approved her MRI    Faxed the order to [facility], [facility]  Radiology

## 2020-02-10 DIAGNOSIS — G43.909 MIGRAINE WITHOUT STATUS MIGRAINOSUS, NOT INTRACTABLE, UNSPECIFIED MIGRAINE TYPE: Primary | ICD-10-CM

## 2020-02-10 RX ORDER — SUMATRIPTAN 20 MG/1
SPRAY NASAL
Qty: 1 INHALER | Refills: 3 | Status: SHIPPED | OUTPATIENT
Start: 2020-02-10 | End: 2021-02-28

## 2020-02-17 ENCOUNTER — TELEPHONE (OUTPATIENT)
Dept: FAMILY MEDICINE CLINIC | Facility: CLINIC | Age: 30
End: 2020-02-17

## 2020-02-17 NOTE — TELEPHONE ENCOUNTER
[place] the park with [place]  Hit her head kindof hard on a metal bar  It is sore  No headaches, no double vision    Was concerned, should she have someone look at her? Had her MRI done a week ago  We do not have the results yet  She will call them to get them sent

## 2020-02-17 NOTE — TELEPHONE ENCOUNTER
Relayed Dr Bradshaw Million message to Millie Dhaliwal  She stated that the MRI place had the incorrect fax number

## 2020-03-09 DIAGNOSIS — N94.6 DYSMENORRHEA: ICD-10-CM

## 2020-03-09 RX ORDER — OXYCODONE HYDROCHLORIDE AND ACETAMINOPHEN 5; 325 MG/1; MG/1
1 TABLET ORAL EVERY 6 HOURS PRN
Qty: 30 TABLET | Refills: 0 | Status: SHIPPED | OUTPATIENT
Start: 2020-03-09 | End: 2020-06-10 | Stop reason: SDUPTHER

## 2020-03-11 DIAGNOSIS — F41.9 ANXIETY: ICD-10-CM

## 2020-03-12 RX ORDER — ALPRAZOLAM 0.25 MG/1
TABLET ORAL
Qty: 30 TABLET | Refills: 0 | Status: SHIPPED | OUTPATIENT
Start: 2020-03-12 | End: 2020-06-07

## 2020-03-16 ENCOUNTER — TELEPHONE (OUTPATIENT)
Dept: FAMILY MEDICINE CLINIC | Facility: CLINIC | Age: 30
End: 2020-03-16

## 2020-03-16 NOTE — TELEPHONE ENCOUNTER
Greg Sanders is scheduled to come in tomorrow  Her 's boss [place]  She has been home for 1 week and she is not experiencing any symptoms  Her  and his boss were in the same room  The boss has quarantined herself  Jeaneth's  is not experiencing any symptoms  Should she cancel her appointment?

## 2020-03-17 ENCOUNTER — OFFICE VISIT (OUTPATIENT)
Dept: FAMILY MEDICINE CLINIC | Facility: CLINIC | Age: 30
End: 2020-03-17
Payer: COMMERCIAL

## 2020-03-17 VITALS
OXYGEN SATURATION: 99 % | HEIGHT: 64 IN | HEART RATE: 78 BPM | RESPIRATION RATE: 14 BRPM | SYSTOLIC BLOOD PRESSURE: 122 MMHG | DIASTOLIC BLOOD PRESSURE: 80 MMHG | TEMPERATURE: 98.4 F | WEIGHT: 172 LBS | BODY MASS INDEX: 29.37 KG/M2

## 2020-03-17 DIAGNOSIS — R00.2 PALPITATIONS: Primary | ICD-10-CM

## 2020-03-17 DIAGNOSIS — R10.13 DYSPEPSIA: ICD-10-CM

## 2020-03-17 DIAGNOSIS — G43.909 MIGRAINE WITHOUT STATUS MIGRAINOSUS, NOT INTRACTABLE, UNSPECIFIED MIGRAINE TYPE: ICD-10-CM

## 2020-03-17 PROCEDURE — 93000 ELECTROCARDIOGRAM COMPLETE: CPT | Performed by: FAMILY MEDICINE

## 2020-03-17 PROCEDURE — 99213 OFFICE O/P EST LOW 20 MIN: CPT | Performed by: FAMILY MEDICINE

## 2020-03-17 PROCEDURE — 3008F BODY MASS INDEX DOCD: CPT | Performed by: FAMILY MEDICINE

## 2020-03-17 PROCEDURE — 1036F TOBACCO NON-USER: CPT | Performed by: FAMILY MEDICINE

## 2020-03-17 RX ORDER — FAMOTIDINE 40 MG/1
40 TABLET, FILM COATED ORAL DAILY
Qty: 30 TABLET | Refills: 3 | Status: SHIPPED | OUTPATIENT
Start: 2020-03-17 | End: 2020-09-08 | Stop reason: SDUPTHER

## 2020-03-17 RX ORDER — ACETAMINOPHEN, ASPIRIN AND CAFFEINE 250; 250; 65 MG/1; MG/1; MG/1
1 TABLET, FILM COATED ORAL AS NEEDED
COMMUNITY

## 2020-03-17 NOTE — PATIENT INSTRUCTIONS
PLENTY OF FLUIDS    SYMPTOM DIARY    ECHO AND HOLTER WILL BE ORDERED  TRIAL OF ADDITION OF FAMOTIDINE  MAY NEED GI CONSULT    FURTHER PLANS PENDING EVALUATION

## 2020-03-17 NOTE — PROGRESS NOTES
Assessment/Plan:    No problem-specific Assessment & Plan notes found for this encounter  Diagnoses and all orders for this visit:    Palpitations  -     POCT ECG  -     Holter monitor - 24 hour; Future  -     Echo complete with contrast if indicated; Future    Dyspepsia  -     famotidine (PEPCID) 40 MG tablet; Take 1 tablet (40 mg total) by mouth daily    Migraine without status migrainosus, not intractable, unspecified migraine type    Other orders  -     aspirin-acetaminophen-caffeine (Excedrin Migraine) 250-250-65 MG per tablet; Take 1 tablet by mouth as needed for headaches          Patient Instructions   PLENTY OF FLUIDS    SYMPTOM DIARY    ECHO AND HOLTER WILL BE ORDERED  TRIAL OF ADDITION OF FAMOTIDINE  MAY NEED GI CONSULT    FURTHER PLANS PENDING EVALUATION      Return if symptoms worsen or fail to improve, for Next scheduled follow up  Subjective:      Patient ID: Hao Ayers is a 34 y o  female  Chief Complaint   Patient presents with    Chest Pain     palpitations x2 weeks    Migraine     D/C Elavil due to drowsyness, past 2 wks headaches are better, would like to have referral to Neurologist       Chest Pain    This is a recurrent problem  The current episode started 1 to 4 weeks ago  The onset quality is gradual  The problem occurs every several days  The problem has been gradually worsening  The pain is present in the substernal region and lateral region  The pain is mild  The quality of the pain is described as dull  The pain radiates to the left arm  Associated symptoms include palpitations  Pertinent negatives include no claudication, diaphoresis, exertional chest pressure, headaches, hemoptysis, irregular heartbeat, leg pain, lower extremity edema, malaise/fatigue, near-syncope, orthopnea, PND, shortness of breath, sputum production or syncope  The pain is aggravated by nothing  She has tried nothing for the symptoms  Risk factors include stress         The following portions of the patient's history were reviewed and updated as appropriate: allergies, current medications, past family history, past medical history, past social history, past surgical history and problem list     Review of Systems   Constitutional: Negative for diaphoresis and malaise/fatigue  HENT: Negative for congestion  Eyes: Negative for discharge  Respiratory: Negative for hemoptysis, sputum production, chest tightness and shortness of breath  Cardiovascular: Positive for chest pain and palpitations  Negative for orthopnea, claudication, syncope, PND and near-syncope  Gastrointestinal: Negative for diarrhea  Musculoskeletal: Negative for arthralgias and joint swelling  Neurological: Negative for headaches  Psychiatric/Behavioral: The patient is not nervous/anxious            Current Outpatient Medications   Medication Sig Dispense Refill    ALPRAZolam (XANAX) 0 25 mg tablet TAKE 1 TABLET(0 25 MG) BY MOUTH TWICE DAILY AS NEEDED FOR ANXIETY 30 tablet 0    Alum & Mag Hydroxide-Simeth (MYLANTA PO) Take 10 mL by mouth as needed (couple times per week)       aspirin-acetaminophen-caffeine (Excedrin Migraine) 250-250-65 MG per tablet Take 1 tablet by mouth as needed for headaches      esomeprazole (NEXIUM) 20 mg capsule Take 1 capsule (20 mg total) by mouth daily in the early morning 30 capsule 5    fluticasone (FLONASE) 50 mcg/act nasal spray 2 sprays into each nostril as needed   0    oxyCODONE-acetaminophen (PERCOCET) 5-325 mg per tablet Take 1 tablet by mouth every 6 (six) hours as needed for moderate painMax Daily Amount: 4 tablets 30 tablet 0    SUMAtriptan (IMITREX) 20 MG/ACT nasal spray 1 SPRAY PRN MIGRAINE MAY REPEAT X 1 IN 2 HRS IF HA IS NOT GONE 1 Inhaler 3    valACYclovir (VALTREX) 1,000 mg tablet TAKE 1 TABLET(1000 MG) BY MOUTH THREE TIMES DAILY FOR 7 DAYS (Patient taking differently: as needed ) 21 tablet 0    famotidine (PEPCID) 40 MG tablet Take 1 tablet (40 mg total) by mouth daily 30 tablet 3     No current facility-administered medications for this visit  Objective:    /80   Pulse 78   Temp 98 4 °F (36 9 °C) (Temporal)   Resp 14   Ht 5' 4" (1 626 m)   Wt 78 kg (172 lb)   SpO2 99%   BMI 29 52 kg/m²        Physical Exam   Constitutional: She is oriented to person, place, and time  She appears well-developed and well-nourished  HENT:   Head: Normocephalic and atraumatic  Eyes: Pupils are equal, round, and reactive to light  Conjunctivae and EOM are normal  Right eye exhibits no discharge  Left eye exhibits no discharge  Neck: Normal range of motion  Neck supple  No thyromegaly present  Cardiovascular: Normal rate, regular rhythm and normal heart sounds  No murmur heard  Pulmonary/Chest: Effort normal and breath sounds normal  No respiratory distress  She has no wheezes  She has no rales  Abdominal: Soft  Bowel sounds are normal  There is no tenderness  Musculoskeletal: Normal range of motion  She exhibits no edema or tenderness  Lymphadenopathy:     She has no cervical adenopathy  Neurological: She is alert and oriented to person, place, and time  Skin: Skin is warm and dry  No rash noted  No erythema  Psychiatric: She has a normal mood and affect   Her behavior is normal  Judgment and thought content normal               Gem Rondon MD

## 2020-03-31 ENCOUNTER — TELEPHONE (OUTPATIENT)
Dept: FAMILY MEDICINE CLINIC | Facility: CLINIC | Age: 30
End: 2020-03-31

## 2020-03-31 NOTE — TELEPHONE ENCOUNTER
PLEASE CALL BACK    AT THIS POINT I WOULD NOT REQUEST EXTRA PRESCRIPTIONS    MONITOR FOR INCREASED PAIN AND SOB  IT SHOULD GET BETTER SLOWLY  THANKS

## 2020-03-31 NOTE — TELEPHONE ENCOUNTER
1  Read somewhere: she should ask for extra prescriptions  Should she get extra medications? Something about maybe shortages  2  2 days ago slid down stairs, couple bruises on her rt side (chest/breast area), should she worry about anything else  No breathing issues, no SOB)      Please Advise    Rt cma

## 2020-04-07 ENCOUNTER — TELEPHONE (OUTPATIENT)
Dept: FAMILY MEDICINE CLINIC | Facility: CLINIC | Age: 30
End: 2020-04-07

## 2020-06-05 DIAGNOSIS — F41.9 ANXIETY: ICD-10-CM

## 2020-06-07 RX ORDER — ALPRAZOLAM 0.25 MG/1
TABLET ORAL
Qty: 30 TABLET | Refills: 0 | Status: SHIPPED | OUTPATIENT
Start: 2020-06-07 | End: 2020-11-24

## 2020-06-10 ENCOUNTER — TELEPHONE (OUTPATIENT)
Dept: FAMILY MEDICINE CLINIC | Facility: CLINIC | Age: 30
End: 2020-06-10

## 2020-06-10 DIAGNOSIS — N94.6 DYSMENORRHEA: ICD-10-CM

## 2020-06-10 RX ORDER — OXYCODONE HYDROCHLORIDE AND ACETAMINOPHEN 5; 325 MG/1; MG/1
1 TABLET ORAL EVERY 6 HOURS PRN
Qty: 30 TABLET | Refills: 0 | Status: SHIPPED | OUTPATIENT
Start: 2020-06-10 | End: 2020-09-08 | Stop reason: SDUPTHER

## 2020-06-12 RX ORDER — NALOXONE HYDROCHLORIDE 0.4 MG/ML
INJECTION, SOLUTION INTRAMUSCULAR; INTRAVENOUS; SUBCUTANEOUS
Status: CANCELLED | OUTPATIENT
Start: 2020-06-12

## 2020-06-19 ENCOUNTER — OFFICE VISIT (OUTPATIENT)
Dept: FAMILY MEDICINE CLINIC | Facility: CLINIC | Age: 30
End: 2020-06-19
Payer: COMMERCIAL

## 2020-06-19 ENCOUNTER — TELEPHONE (OUTPATIENT)
Dept: FAMILY MEDICINE CLINIC | Facility: CLINIC | Age: 30
End: 2020-06-19

## 2020-06-19 VITALS
OXYGEN SATURATION: 100 % | HEIGHT: 65 IN | WEIGHT: 173 LBS | BODY MASS INDEX: 28.82 KG/M2 | SYSTOLIC BLOOD PRESSURE: 108 MMHG | RESPIRATION RATE: 18 BRPM | DIASTOLIC BLOOD PRESSURE: 60 MMHG | HEART RATE: 82 BPM | TEMPERATURE: 98.8 F

## 2020-06-19 DIAGNOSIS — N93.9 VAGINAL SPOTTING: ICD-10-CM

## 2020-06-19 DIAGNOSIS — R10.2 PELVIC PAIN: Primary | ICD-10-CM

## 2020-06-19 LAB
SL AMB  POCT GLUCOSE, UA: 0
SL AMB LEUKOCYTE ESTERASE,UA: 0
SL AMB POCT BILIRUBIN,UA: 0
SL AMB POCT BLOOD,UA: 50
SL AMB POCT CLARITY,UA: CLEAR
SL AMB POCT COLOR,UA: YELLOW
SL AMB POCT KETONES,UA: 0
SL AMB POCT NITRITE,UA: 0
SL AMB POCT PH,UA: 7
SL AMB POCT SPECIFIC GRAVITY,UA: 1.01
SL AMB POCT URINE HCG: NEGATIVE
SL AMB POCT URINE PROTEIN: 0
SL AMB POCT UROBILINOGEN: 0

## 2020-06-19 PROCEDURE — 99213 OFFICE O/P EST LOW 20 MIN: CPT | Performed by: NURSE PRACTITIONER

## 2020-06-19 PROCEDURE — 81003 URINALYSIS AUTO W/O SCOPE: CPT | Performed by: NURSE PRACTITIONER

## 2020-06-19 PROCEDURE — 3008F BODY MASS INDEX DOCD: CPT | Performed by: NURSE PRACTITIONER

## 2020-06-19 PROCEDURE — 81025 URINE PREGNANCY TEST: CPT | Performed by: NURSE PRACTITIONER

## 2020-06-19 PROCEDURE — 1036F TOBACCO NON-USER: CPT | Performed by: NURSE PRACTITIONER

## 2020-06-19 RX ORDER — NALOXONE HYDROCHLORIDE 0.4 MG/ML
INJECTION, SOLUTION INTRAMUSCULAR; INTRAVENOUS; SUBCUTANEOUS
COMMUNITY
Start: 2020-06-15 | End: 2021-02-28

## 2020-06-20 LAB
BACTERIA UR CULT: NORMAL
Lab: NO GROWTH

## 2020-06-25 ENCOUNTER — TELEPHONE (OUTPATIENT)
Dept: FAMILY MEDICINE CLINIC | Facility: CLINIC | Age: 30
End: 2020-06-25

## 2020-06-29 ENCOUNTER — OFFICE VISIT (OUTPATIENT)
Dept: FAMILY MEDICINE CLINIC | Facility: CLINIC | Age: 30
End: 2020-06-29
Payer: COMMERCIAL

## 2020-06-29 ENCOUNTER — TELEPHONE (OUTPATIENT)
Dept: FAMILY MEDICINE CLINIC | Facility: CLINIC | Age: 30
End: 2020-06-29

## 2020-06-29 VITALS
WEIGHT: 171 LBS | HEART RATE: 68 BPM | HEIGHT: 64 IN | TEMPERATURE: 100.1 F | DIASTOLIC BLOOD PRESSURE: 62 MMHG | RESPIRATION RATE: 16 BRPM | SYSTOLIC BLOOD PRESSURE: 100 MMHG | BODY MASS INDEX: 29.19 KG/M2

## 2020-06-29 DIAGNOSIS — Z00.00 PREVENTATIVE HEALTH CARE: ICD-10-CM

## 2020-06-29 DIAGNOSIS — I73.00 RAYNAUD'S DISEASE WITHOUT GANGRENE: ICD-10-CM

## 2020-06-29 DIAGNOSIS — Z13.0 SCREENING FOR DEFICIENCY ANEMIA: ICD-10-CM

## 2020-06-29 DIAGNOSIS — R10.2 PELVIC PAIN: ICD-10-CM

## 2020-06-29 DIAGNOSIS — Z13.1 SCREENING FOR DIABETES MELLITUS: ICD-10-CM

## 2020-06-29 DIAGNOSIS — Z13.220 SCREENING FOR LIPID DISORDERS: ICD-10-CM

## 2020-06-29 DIAGNOSIS — Z13.29 SCREENING FOR THYROID DISORDER: ICD-10-CM

## 2020-06-29 DIAGNOSIS — G43.919 INTRACTABLE MIGRAINE WITHOUT STATUS MIGRAINOSUS, UNSPECIFIED MIGRAINE TYPE: Primary | ICD-10-CM

## 2020-06-29 PROCEDURE — 36415 COLL VENOUS BLD VENIPUNCTURE: CPT | Performed by: NURSE PRACTITIONER

## 2020-06-29 PROCEDURE — 1036F TOBACCO NON-USER: CPT | Performed by: NURSE PRACTITIONER

## 2020-06-29 PROCEDURE — 99213 OFFICE O/P EST LOW 20 MIN: CPT | Performed by: NURSE PRACTITIONER

## 2020-06-29 PROCEDURE — 3008F BODY MASS INDEX DOCD: CPT | Performed by: NURSE PRACTITIONER

## 2020-06-30 LAB
ALBUMIN SERPL-MCNC: 4.6 G/DL (ref 3.9–5)
ALBUMIN/GLOB SERPL: 1.5 {RATIO} (ref 1.2–2.2)
ALP SERPL-CCNC: 74 IU/L (ref 39–117)
ALT SERPL-CCNC: 12 IU/L (ref 0–32)
AST SERPL-CCNC: 20 IU/L (ref 0–40)
BASOPHILS # BLD AUTO: 0 X10E3/UL (ref 0–0.2)
BASOPHILS NFR BLD AUTO: 1 %
BILIRUB SERPL-MCNC: 0.5 MG/DL (ref 0–1.2)
BUN SERPL-MCNC: 15 MG/DL (ref 6–20)
BUN/CREAT SERPL: 20 (ref 9–23)
CALCIUM SERPL-MCNC: 9.9 MG/DL (ref 8.7–10.2)
CHLORIDE SERPL-SCNC: 102 MMOL/L (ref 96–106)
CHOLEST SERPL-MCNC: 172 MG/DL (ref 100–199)
CHOLEST/HDLC SERPL: 3.6 RATIO (ref 0–4.4)
CO2 SERPL-SCNC: 23 MMOL/L (ref 20–29)
CREAT SERPL-MCNC: 0.74 MG/DL (ref 0.57–1)
EOSINOPHIL # BLD AUTO: 0.1 X10E3/UL (ref 0–0.4)
EOSINOPHIL NFR BLD AUTO: 2 %
ERYTHROCYTE [DISTWIDTH] IN BLOOD BY AUTOMATED COUNT: 12.8 % (ref 11.7–15.4)
GLOBULIN SER-MCNC: 3 G/DL (ref 1.5–4.5)
GLUCOSE SERPL-MCNC: 86 MG/DL (ref 65–99)
HCT VFR BLD AUTO: 42 % (ref 34–46.6)
HDLC SERPL-MCNC: 48 MG/DL
HGB BLD-MCNC: 13.7 G/DL (ref 11.1–15.9)
IMM GRANULOCYTES # BLD: 0 X10E3/UL (ref 0–0.1)
IMM GRANULOCYTES NFR BLD: 0 %
LDLC SERPL CALC-MCNC: 100 MG/DL (ref 0–99)
LYMPHOCYTES # BLD AUTO: 1.8 X10E3/UL (ref 0.7–3.1)
LYMPHOCYTES NFR BLD AUTO: 29 %
MCH RBC QN AUTO: 27 PG (ref 26.6–33)
MCHC RBC AUTO-ENTMCNC: 32.6 G/DL (ref 31.5–35.7)
MCV RBC AUTO: 83 FL (ref 79–97)
MONOCYTES # BLD AUTO: 0.4 X10E3/UL (ref 0.1–0.9)
MONOCYTES NFR BLD AUTO: 6 %
NEUTROPHILS # BLD AUTO: 3.9 X10E3/UL (ref 1.4–7)
NEUTROPHILS NFR BLD AUTO: 62 %
PLATELET # BLD AUTO: 300 X10E3/UL (ref 150–450)
POTASSIUM SERPL-SCNC: 4.4 MMOL/L (ref 3.5–5.2)
PROT SERPL-MCNC: 7.6 G/DL (ref 6–8.5)
RBC # BLD AUTO: 5.07 X10E6/UL (ref 3.77–5.28)
SL AMB EGFR AFRICAN AMERICAN: 128 ML/MIN/1.73
SL AMB EGFR NON AFRICAN AMERICAN: 111 ML/MIN/1.73
SL AMB VLDL CHOLESTEROL CALC: 24 MG/DL (ref 5–40)
SODIUM SERPL-SCNC: 140 MMOL/L (ref 134–144)
TRIGL SERPL-MCNC: 119 MG/DL (ref 0–149)
TSH SERPL DL<=0.005 MIU/L-ACNC: 2.27 UIU/ML (ref 0.45–4.5)
WBC # BLD AUTO: 6.2 X10E3/UL (ref 3.4–10.8)

## 2020-07-02 LAB
BEEF IGE QN: <0.1 KU/L
CHOCOLATE IGE QN: <0.1 KU/L
CORN IGE QN: <0.1 KU/L
COW MILK IGE QN: <0.1 KU/L
FOOD ALLERG MIX2 IGE QL: NEGATIVE
Lab: ABNORMAL
PEANUT IGE QN: 0.11 KU/L
PORK IGE QN: <0.1 KU/L
SOYBEAN IGE QN: <0.1 KU/L
WHEAT IGE QN: <0.1 KU/L
WHOLE EGG IGE QN: <0.1 KU/L

## 2020-08-03 ENCOUNTER — OFFICE VISIT (OUTPATIENT)
Dept: FAMILY MEDICINE CLINIC | Facility: CLINIC | Age: 30
End: 2020-08-03
Payer: COMMERCIAL

## 2020-08-03 VITALS
HEIGHT: 64 IN | TEMPERATURE: 98.8 F | BODY MASS INDEX: 29.37 KG/M2 | HEART RATE: 80 BPM | RESPIRATION RATE: 12 BRPM | DIASTOLIC BLOOD PRESSURE: 78 MMHG | WEIGHT: 172 LBS | SYSTOLIC BLOOD PRESSURE: 112 MMHG

## 2020-08-03 DIAGNOSIS — R10.13 DYSPEPSIA: ICD-10-CM

## 2020-08-03 DIAGNOSIS — I73.00 RAYNAUD'S DISEASE WITHOUT GANGRENE: ICD-10-CM

## 2020-08-03 DIAGNOSIS — Z13.29 SCREENING FOR HYPOTHYROIDISM: ICD-10-CM

## 2020-08-03 DIAGNOSIS — Z00.00 ROUTINE GENERAL MEDICAL EXAMINATION AT A HEALTH CARE FACILITY: Primary | ICD-10-CM

## 2020-08-03 DIAGNOSIS — Z13.220 SCREENING FOR HYPERLIPIDEMIA: ICD-10-CM

## 2020-08-03 DIAGNOSIS — G43.909 MIGRAINE WITHOUT STATUS MIGRAINOSUS, NOT INTRACTABLE, UNSPECIFIED MIGRAINE TYPE: ICD-10-CM

## 2020-08-03 DIAGNOSIS — M54.32 SCIATICA OF LEFT SIDE: ICD-10-CM

## 2020-08-03 DIAGNOSIS — Z13.6 SCREENING FOR HYPERTENSION: ICD-10-CM

## 2020-08-03 DIAGNOSIS — J30.1 SEASONAL ALLERGIC RHINITIS DUE TO POLLEN: ICD-10-CM

## 2020-08-03 PROBLEM — R10.2 PELVIC PAIN: Status: RESOLVED | Noted: 2019-09-09 | Resolved: 2020-08-03

## 2020-08-03 PROBLEM — R51.9 ACUTE NONINTRACTABLE HEADACHE: Status: RESOLVED | Noted: 2020-01-17 | Resolved: 2020-08-03

## 2020-08-03 LAB — ECG INTERP DURING EX: NORMAL MS

## 2020-08-03 PROCEDURE — 3008F BODY MASS INDEX DOCD: CPT | Performed by: FAMILY MEDICINE

## 2020-08-03 PROCEDURE — 3725F SCREEN DEPRESSION PERFORMED: CPT | Performed by: FAMILY MEDICINE

## 2020-08-03 PROCEDURE — 1036F TOBACCO NON-USER: CPT | Performed by: FAMILY MEDICINE

## 2020-08-03 PROCEDURE — 99395 PREV VISIT EST AGE 18-39: CPT | Performed by: FAMILY MEDICINE

## 2020-08-03 PROCEDURE — 93000 ELECTROCARDIOGRAM COMPLETE: CPT | Performed by: FAMILY MEDICINE

## 2020-08-03 NOTE — LETTER
[address] [facility]    Patient Active Problem List   Diagnosis    Allergic rhinitis    Anxiety    Chronic bilateral thoracic back pain    Raynauds syndrome    Uterus, adenomyosis    Migraine without status migrainosus, not intractable    Chronic nonintractable headache    Dyspepsia    Dysmenorrhea    Sciatica of left side    Pelvic pain    Acute nonintractable headache    Palpitations           Current Outpatient Medications:     ALPRAZolam (XANAX) 0 25 mg tablet, TAKE 1 TABLET(0 25 MG) BY MOUTH TWICE DAILY AS NEEDED FOR ANXIETY, Disp: 30 tablet, Rfl: 0    Alum & Mag Hydroxide-Simeth (MYLANTA PO), Take 10 mL by mouth as needed (couple times per week) , Disp: , Rfl:     aspirin-acetaminophen-caffeine (Excedrin Migraine) 250-250-65 MG per tablet, Take 1 tablet by mouth as needed for headaches, Disp: , Rfl:     esomeprazole (NEXIUM) 20 mg capsule, Take 1 capsule (20 mg total) by mouth daily in the early morning (Patient not taking: Reported on 6/29/2020), Disp: 30 capsule, Rfl: 5    famotidine (PEPCID) 40 MG tablet, Take 1 tablet (40 mg total) by mouth daily, Disp: 30 tablet, Rfl: 3    fluticasone (FLONASE) 50 mcg/act nasal spray, 2 sprays into each nostril as needed , Disp: , Rfl: 0    naloxone (NARCAN) 0 4 mg/mL injection, Use as directed, Disp: , Rfl:     oxyCODONE-acetaminophen (PERCOCET) 5-325 mg per tablet, Take 1 tablet by mouth every 6 (six) hours as needed for moderate painMax Daily Amount: 4 tablets, Disp: 30 tablet, Rfl: 0    SUMAtriptan (IMITREX) 20 MG/ACT nasal spray, 1 SPRAY PRN MIGRAINE MAY REPEAT X 1 IN 2 HRS IF HA IS NOT GONE, Disp: 1 Inhaler, Rfl: 3    valACYclovir (VALTREX) 1,000 mg tablet, TAKE 1 TABLET(1000 MG) BY MOUTH THREE TIMES DAILY FOR 7 DAYS (Patient not taking: No sig reported), Disp: 21 tablet, Rfl: 0    Recent Results (from the past 2688 hour(s))   POCT urine dip auto non-scope    Collection Time: 06/19/20  3:26 PM   Result Value Ref Range     COLOR,UA yellow CLARITY,UA clear     SPECIFIC GRAVITY,UA 1 015      PH,UA 7     LEUKOCYTE ESTERASE,UA 0     NITRITE,UA 0     GLUCOSE, UA 0     KETONES,UA 0     BILIRUBIN,UA 0     BLOOD,UA 50     POCT URINE PROTEIN 0     SL AMB POCT UROBILINOGEN 0    POCT urine HCG    Collection Time: 06/19/20  3:27 PM   Result Value Ref Range    URINE HCG negative    Urine culture    Collection Time: 06/19/20  3:39 PM    Specimen: Urine, Clean Catch    URINE   Result Value Ref Range    Urine Culture Result Final report    Result    Collection Time: 06/19/20  3:39 PM   Result Value Ref Range    Result 1 No growth    CBC and differential    Collection Time: 06/29/20 11:01 AM   Result Value Ref Range    White Blood Cell Count 6 2 3 4 - 10 8 x10E3/uL    Red Blood Cell Count 5 07 3 77 - 5 28 x10E6/uL    Hemoglobin 13 7 11 1 - 15 9 g/dL    HCT 42 0 34 0 - 46 6 %    MCV 83 79 - 97 fL    MCH 27 0 26 6 - 33 0 pg    MCHC 32 6 31 5 - 35 7 g/dL    RDW 12 8 11 7 - 15 4 %    Platelet Count 542 099 - 450 x10E3/uL    Neutrophils 62 Not Estab  %    Lymphocytes 29 Not Estab  %    Monocytes 6 Not Estab  %    Eosinophils 2 Not Estab  %    Basophils PCT 1 Not Estab  %    Neutrophils (Absolute) 3 9 1 4 - 7 0 x10E3/uL    Lymphocytes (Absolute) 1 8 0 7 - 3 1 x10E3/uL    Monocytes (Absolute) 0 4 0 1 - 0 9 x10E3/uL    Eosinophils (Absolute) 0 1 0 0 - 0 4 x10E3/uL    Basophils ABS 0 0 0 0 - 0 2 x10E3/uL    Immature Granulocytes 0 Not Estab  %    Immature Granulocytes (Absolute) 0 0 0 0 - 0 1 x10E3/uL   Comprehensive metabolic panel    Collection Time: 06/29/20 11:01 AM   Result Value Ref Range    Glucose, Random 86 65 - 99 mg/dL    BUN 15 6 - 20 mg/dL    Creatinine 0 74 0 57 - 1 00 mg/dL    eGFR Non  111 >59 mL/min/1 73    eGFR  128 >59 mL/min/1 73    SL AMB BUN/CREATININE RATIO 20 9 - 23    Sodium 140 134 - 144 mmol/L    Potassium 4 4 3 5 - 5 2 mmol/L    Chloride 102 96 - 106 mmol/L    CO2 23 20 - 29 mmol/L    CALCIUM 9 9 8 7 - 10 2 mg/dL Protein, Total 7 6 6 0 - 8 5 g/dL    Albumin 4 6 3 9 - 5 0 g/dL    Globulin, Total 3 0 1 5 - 4 5 g/dL    Albumin/Globulin Ratio 1 5 1 2 - 2 2    TOTAL BILIRUBIN 0 5 0 0 - 1 2 mg/dL    Alk Phos Isoenzymes 74 39 - 117 IU/L    AST 20 0 - 40 IU/L    ALT 12 0 - 32 IU/L   TSH, 3rd generation    Collection Time: 06/29/20 11:01 AM   Result Value Ref Range    TSH 2 270 0 450 - 4 500 uIU/mL   Lipid panel    Collection Time: 06/29/20 11:01 AM   Result Value Ref Range    Cholesterol, Total 172 100 - 199 mg/dL    Triglycerides 119 0 - 149 mg/dL    HDL 48 >39 mg/dL    VLDL Cholesterol Calculated 24 5 - 40 mg/dL    LDL Calculated 100 (H) 0 - 99 mg/dL    T   Chol/HDL Ratio 3 6 0 0 - 4 4 ratio   Allergen Profile, Basic Food    Collection Time: 06/29/20 11:01 AM   Result Value Ref Range    Class Description Comment     Milk <0 10 Class 0 kU/L    Wheat IgE <0 10 Class 0 kU/L    CORN IGE <0 10 Class 0 kU/L    M512-OrS Peanut 0 11 (A) Class 0/I kU/L    K578-UvI Soybean <0 10 Class 0 kU/L    Pork IgE <0 10 Class 0 kU/L    Beef <0 10 Class 0 kU/L    FX02 IgE Food Mix (Seafoods) Negative     Egg <0 10 Class 0 kU/L    Chocolate <0 10 Class 0 kU/L

## 2020-08-03 NOTE — PATIENT INSTRUCTIONS
DISCUSSED HEALTH ISSUES  HEALTHY DIET AND EXERCISE  BW WILL BE OBTAINED  MAMMOGRAPHY   RECOMMEND CALCIUM 9669-4479 MG DAILY  VITAMIN D3  1000 IU DAILY  RV IN 1 YEAR FOR ANNUAL EXAM, SOONER IF NEEDED      Recent Results (from the past 2688 hour(s))   POCT urine dip auto non-scope    Collection Time: 06/19/20  3:26 PM   Result Value Ref Range     COLOR,UA yellow     CLARITY,UA clear     SPECIFIC GRAVITY,UA 1 015      PH,UA 7     LEUKOCYTE ESTERASE,UA 0     NITRITE,UA 0     GLUCOSE, UA 0     KETONES,UA 0     BILIRUBIN,UA 0     BLOOD,UA 50     POCT URINE PROTEIN 0     SL AMB POCT UROBILINOGEN 0    POCT urine HCG    Collection Time: 06/19/20  3:27 PM   Result Value Ref Range    URINE HCG negative    Urine culture    Collection Time: 06/19/20  3:39 PM    Specimen: Urine, Clean Catch    URINE   Result Value Ref Range    Urine Culture Result Final report    Result    Collection Time: 06/19/20  3:39 PM   Result Value Ref Range    Result 1 No growth    CBC and differential    Collection Time: 06/29/20 11:01 AM   Result Value Ref Range    White Blood Cell Count 6 2 3 4 - 10 8 x10E3/uL    Red Blood Cell Count 5 07 3 77 - 5 28 x10E6/uL    Hemoglobin 13 7 11 1 - 15 9 g/dL    HCT 42 0 34 0 - 46 6 %    MCV 83 79 - 97 fL    MCH 27 0 26 6 - 33 0 pg    MCHC 32 6 31 5 - 35 7 g/dL    RDW 12 8 11 7 - 15 4 %    Platelet Count 222 561 - 450 x10E3/uL    Neutrophils 62 Not Estab  %    Lymphocytes 29 Not Estab  %    Monocytes 6 Not Estab  %    Eosinophils 2 Not Estab  %    Basophils PCT 1 Not Estab  %    Neutrophils (Absolute) 3 9 1 4 - 7 0 x10E3/uL    Lymphocytes (Absolute) 1 8 0 7 - 3 1 x10E3/uL    Monocytes (Absolute) 0 4 0 1 - 0 9 x10E3/uL    Eosinophils (Absolute) 0 1 0 0 - 0 4 x10E3/uL    Basophils ABS 0 0 0 0 - 0 2 x10E3/uL    Immature Granulocytes 0 Not Estab  %    Immature Granulocytes (Absolute) 0 0 0 0 - 0 1 x10E3/uL   Comprehensive metabolic panel    Collection Time: 06/29/20 11:01 AM   Result Value Ref Range    Glucose, Random 86 65 - 99 mg/dL    BUN 15 6 - 20 mg/dL    Creatinine 0 74 0 57 - 1 00 mg/dL    eGFR Non  111 >59 mL/min/1 73    eGFR  128 >59 mL/min/1 73    SL AMB BUN/CREATININE RATIO 20 9 - 23    Sodium 140 134 - 144 mmol/L    Potassium 4 4 3 5 - 5 2 mmol/L    Chloride 102 96 - 106 mmol/L    CO2 23 20 - 29 mmol/L    CALCIUM 9 9 8 7 - 10 2 mg/dL    Protein, Total 7 6 6 0 - 8 5 g/dL    Albumin 4 6 3 9 - 5 0 g/dL    Globulin, Total 3 0 1 5 - 4 5 g/dL    Albumin/Globulin Ratio 1 5 1 2 - 2 2    TOTAL BILIRUBIN 0 5 0 0 - 1 2 mg/dL    Alk Phos Isoenzymes 74 39 - 117 IU/L    AST 20 0 - 40 IU/L    ALT 12 0 - 32 IU/L   TSH, 3rd generation    Collection Time: 06/29/20 11:01 AM   Result Value Ref Range    TSH 2 270 0 450 - 4 500 uIU/mL   Lipid panel    Collection Time: 06/29/20 11:01 AM   Result Value Ref Range    Cholesterol, Total 172 100 - 199 mg/dL    Triglycerides 119 0 - 149 mg/dL    HDL 48 >39 mg/dL    VLDL Cholesterol Calculated 24 5 - 40 mg/dL    LDL Calculated 100 (H) 0 - 99 mg/dL    T   Chol/HDL Ratio 3 6 0 0 - 4 4 ratio   Allergen Profile, Basic Food    Collection Time: 06/29/20 11:01 AM   Result Value Ref Range    Class Description Comment     Milk <0 10 Class 0 kU/L    Wheat IgE <0 10 Class 0 kU/L    CORN IGE <0 10 Class 0 kU/L    T028-[id number] Peanut 0 11 (A) Class 0/I kU/L    W626-UhL Soybean <0 10 Class 0 kU/L    Pork IgE <0 10 Class 0 kU/L    Beef <0 10 Class 0 kU/L    FX02 IgE Food Mix (Seafoods) Negative     Egg <0 10 Class 0 kU/L    Chocolate <0 10 Class 0 kU/L

## 2020-08-03 NOTE — PROGRESS NOTES
FAMILY PRACTICE HEALTH MAINTENANCE OFFICE VISIT  [facility] - [address] PHYSICIANS    NAME: Poli Blanc  AGE: 34 y o  SEX: female  : 1990     DATE: 8/3/2020    Assessment and Plan     Problem List Items Addressed This Visit        Respiratory    Allergic rhinitis       Cardiovascular and Mediastinum    Raynauds syndrome    Migraine without status migrainosus, not intractable       Nervous and Auditory    Sciatica of left side       Other    Dyspepsia      Other Visit Diagnoses     Routine general medical examination at a health care facility    -  Primary    Screening for hypertension        Relevant Orders    POCT ECG    Screening for hyperlipidemia        Screening for hypothyroidism                   Return in about 1 year (around 8/3/2021) for Annual physical         Chief Complaint     Chief Complaint   Patient presents with    Annual Exam     Pt here for a CPE with PAP  History of Present Illness     DISCUSSED HEALTH ISSUES  REVIEWED MEDICAL RECORD  NO CONCERNS AT THIS TIME        Well Adult Physical   Patient here for a comprehensive physical exam       Diet and Physical Activity  Diet: well balanced diet  Weight concerns: Patient is overweight (BMI 25 0-29  9)  Exercise: infrequently      Depression Screen  PHQ-9 Depression Screening    PHQ-9:    Frequency of the following problems over the past two weeks:       Little interest or pleasure in doing things:  0 - not at all  Feeling down, depressed, or hopeless:  0 - not at all  PHQ-2 Score:  0          General Health  Hearing: Normal:  bilateral  Vision: no vision problems  Dental: regular dental visits    Reproductive Health          The following portions of the patient's history were reviewed and updated as appropriate: allergies, current medications, past family history, past medical history, past social history, past surgical history and problem list     Review of Systems     Review of Systems Constitutional: Negative for chills, fatigue and fever  HENT: Negative for congestion, ear discharge, ear pain, mouth sores, postnasal drip, sore throat and trouble swallowing  Eyes: Negative for pain, discharge and visual disturbance  Respiratory: Negative for cough, shortness of breath and wheezing  Cardiovascular: Negative for chest pain, palpitations and leg swelling  Gastrointestinal: Negative for abdominal distention, abdominal pain, blood in stool, diarrhea and nausea  Endocrine: Negative for polydipsia, polyphagia and polyuria  Genitourinary: Negative for dysuria, frequency, hematuria and urgency  Musculoskeletal: Positive for arthralgias  Negative for gait problem and joint swelling  Skin: Negative for pallor and rash  Neurological: Positive for headaches  Negative for dizziness, syncope, speech difficulty, weakness, light-headedness and numbness  Hematological: Negative for adenopathy  Psychiatric/Behavioral: Negative for behavioral problems, confusion and sleep disturbance  The patient is not nervous/anxious          Past Medical History     Past Medical History:   Diagnosis Date    Atopic dermatitis     Resolved: [id number]    Bunion of great toe of right foot     Last Assessed: [id number]    Chronic sinusitis     Resolved: [id number]    Herpes labialis     Resolved: [id number]    Kidney stone     Ovarian cyst     Peptic ulcer        Past Surgical History     Past Surgical History:   Procedure Laterality Date     SECTION  2015     SECTION  10/20/2017    SINUS SURGERY      WISDOM TOOTH EXTRACTION         Social History     Social History     Socioeconomic History    Marital status: /Civil Union     Spouse name: None    Number of children: None    Years of education: None    Highest education level: None   Occupational History    None   Social Needs    Financial resource strain: None    Food insecurity     Worry: None     Inability: None  Transportation needs     Medical: None     Non-medical: None   Tobacco Use    Smoking status: Never Smoker    Smokeless tobacco: Never Used   Substance and Sexual Activity    Alcohol use: Yes     Frequency: Monthly or less     Drinks per session: 1 or 2     Comment: occasional    Drug use: No    Sexual activity: Yes     Birth control/protection: None   Lifestyle    Physical activity     Days per week: None     Minutes per session: None    Stress: None   Relationships    Social connections     Talks on phone: None     Gets together: None     Attends [employer] service: None     Active member of club or organization: None     Attends meetings of clubs or organizations: None     Relationship status: None    Intimate partner violence     Fear of current or ex partner: None     Emotionally abused: None     Physically abused: None     Forced sexual activity: None   Other Topics Concern    None   Social History Narrative    Caffeine use    Cultural backgroun: Non-    Dental care, regularly    Patient ingests cola containing caffeine    Primary spoken language English    Tea       Family History     Family History   Problem Relation Age of Onset    Hyperlipidemia Father     Hypertension Father     Asthma Mother     Breast cancer Paternal Grandmother     Breast cancer Paternal Aunt     Substance Abuse Neg Hx     Mental illness Neg Hx        Current Medications       Current Outpatient Medications:     ALPRAZolam (XANAX) 0 25 mg tablet, TAKE 1 TABLET(0 25 MG) BY MOUTH TWICE DAILY AS NEEDED FOR ANXIETY, Disp: 30 tablet, Rfl: 0    Alum & Mag Hydroxide-Simeth (MYLANTA PO), Take 10 mL by mouth as needed (couple times per week) , Disp: , Rfl:     aspirin-acetaminophen-caffeine (Excedrin Migraine) 250-250-65 MG per tablet, Take 1 tablet by mouth as needed for headaches, Disp: , Rfl:     esomeprazole (NEXIUM) 20 mg capsule, Take 1 capsule (20 mg total) by mouth daily in the early morning, Disp: 30 capsule, Rfl: 5    famotidine (PEPCID) 40 MG tablet, Take 1 tablet (40 mg total) by mouth daily, Disp: 30 tablet, Rfl: 3    fluticasone (FLONASE) 50 mcg/act nasal spray, 2 sprays into each nostril as needed , Disp: , Rfl: 0    naloxone (NARCAN) 0 4 mg/mL injection, Use as directed, Disp: , Rfl:     oxyCODONE-acetaminophen (PERCOCET) 5-325 mg per tablet, Take 1 tablet by mouth every 6 (six) hours as needed for moderate painMax Daily Amount: 4 tablets, Disp: 30 tablet, Rfl: 0    SUMAtriptan (IMITREX) 20 MG/ACT nasal spray, 1 SPRAY PRN MIGRAINE MAY REPEAT X 1 IN 2 HRS IF HA IS NOT GONE, Disp: 1 Inhaler, Rfl: 3    valACYclovir (VALTREX) 1,000 mg tablet, TAKE 1 TABLET(1000 MG) BY MOUTH THREE TIMES DAILY FOR 7 DAYS (Patient not taking: No sig reported), Disp: 21 tablet, Rfl: 0     Allergies     Allergies   Allergen Reactions    Elavil [Amitriptyline] Drowsiness    Prednisone GI Intolerance    Tramadol Itching    Vicodin [Hydrocodone-Acetaminophen] Itching    Latex Hives       Objective     /78 (BP Location: Right arm, Patient Position: Sitting, Cuff Size: Adult)   Pulse 80   Temp 98 8 °F (37 1 °C) (Temporal)   Resp 12   Ht 5' 4"   Wt 78 kg (172 lb)   LMP 08/02/2020 (Exact Date)   BMI 29 52 kg/m²      Physical Exam   Constitutional: She is oriented to person, place, and time  She appears well-developed  HENT:   Head: Normocephalic and atraumatic  Right Ear: External ear normal    Left Ear: External ear normal    Nose: Nose normal    Eyes: Pupils are equal, round, and reactive to light  Conjunctivae are normal  Right eye exhibits no discharge  Left eye exhibits no discharge  Neck: Normal range of motion  Neck supple  No thyromegaly present  Cardiovascular: Normal rate, regular rhythm and normal heart sounds  No murmur heard  Pulmonary/Chest: Effort normal and breath sounds normal  She has no wheezes  She has no rales  Abdominal: Soft   Bowel sounds are normal  She exhibits no distension and no mass  There is no abdominal tenderness  There is no rebound and no guarding  Musculoskeletal: Normal range of motion  General: No tenderness or deformity  Lymphadenopathy:     She has no cervical adenopathy  Neurological: She is alert and oriented to person, place, and time  She has normal reflexes  No cranial nerve deficit  She exhibits normal muscle tone  Coordination normal    Skin: Skin is warm and dry  No rash noted  No erythema  Psychiatric: Her behavior is normal  Judgment and thought content normal    Vitals reviewed  Visual Acuity Screening    Right eye Left eye Both eyes   Without correction: 20/20 20/25 20/20   With correction:      Comments: Pt correctly identified the colors  sp/cma      Health Maintenance     Health Maintenance   Topic Date Due    Cervical Cancer Screening  04/05/2020    Influenza Vaccine  07/01/2020    Annual Physical  08/01/2020    Depression Screening PHQ  01/17/2021    BMI: Followup Plan  01/17/2021    BMI: Adult  08/03/2021    DTaP,Tdap,and Td Vaccines (7 - Td) 07/28/2026    HIB Vaccine  Completed    IPV Vaccine  Completed    HIV Screening  Addressed    Pneumococcal Vaccine: Pediatrics (0 to 5 Years) and At-Risk Patients (6 to 59 Years)  Aged Out    Hepatitis B Vaccine  Aged Out    Hepatitis A Vaccine  Aged Out    Meningococcal ACWY Vaccine  Aged Out    HPV Vaccine  Aged [employer] History   Administered Date(s) Administered    DTP 02/08/1993, 04/07/1993, 06/28/1993, 06/14/1994, 08/25/1998    Hib (PRP-OMP) 02/08/1993, 04/07/1993, 12/10/1993    INFLUENZA 10/22/2018    IPV 02/08/1993, 04/07/1993, 06/14/1994, 08/25/1998    Influenza, injectable, quadrivalent, preservative free 0 5 mL 10/22/2018, 11/14/2019    MMR 03/17/1994, 08/25/1998    Meningococcal, Unknown Serogroups 05/04/2010    Td (adult), adsorbed 06/21/2007    Tdap 07/28/2016    Tuberculin Skin Test 09/09/1993    Tuberculin Skin Test-PPD Intradermal 12/04/2014       Veronika Ratliff MD  [facility]

## 2020-08-10 DIAGNOSIS — B00.9 HERPES SIMPLEX: ICD-10-CM

## 2020-08-11 RX ORDER — VALACYCLOVIR HYDROCHLORIDE 1 G/1
TABLET, FILM COATED ORAL
Qty: 21 TABLET | Refills: 0 | Status: SHIPPED | OUTPATIENT
Start: 2020-08-11 | End: 2021-02-28

## 2020-08-13 ENCOUNTER — TELEPHONE (OUTPATIENT)
Dept: FAMILY MEDICINE CLINIC | Facility: CLINIC | Age: 30
End: 2020-08-13

## 2020-08-13 NOTE — TELEPHONE ENCOUNTER
----- Message from Lila Stoddard Casia  sent at 8/13/2020 11:37 AM EDT -----  Regarding: FW: Non-Urgent Medical Question  Contact: [phone]  Can you call Tone Hannah and ask if she is able to come in for exam and possible referral to PT afterward?     ----- Message -----  From: Tao Fallon MA  Sent: 8/13/2020  11:23 AM EDT  To: SHAUNA Stoddard  Subject: FW: Non-Urgent Medical Question                    ----- Message -----  From: Umair June  Sent: 8/13/2020   9:44 AM EDT  To: [facility]  Subject: Non-Urgent [facility] Wilda Mirza,    Starting yesterday evening, my neck on the left side had been bothering me a lot, with pain going up my head to my left eye  I figured it was just a migraine, but then in the middle of the night I was getting very sharp pains in my left side under my breast and it wrapped around to my back, as well as the neck pain still being present  I'm sure it's probably bad posture/pinched nerve, but should I see a chiropractor or a physical therapist or do I need imaging done first? These pains have been coming and going for a while, but lately feel worse and more frequent  I've tried heat, cold, massage and even some physical therapy exercises I found online, but they have been very temporary, if any, relief  I attached a reference picture with the areas of pain highlighted in red  Hope it helps give you a better idea of the pain  It is more of an ache in my neck and a sharp coming and going pain in my side/back    Thanks so much,  Tone Young

## 2020-08-13 NOTE — TELEPHONE ENCOUNTER
Called pt per Pau Hancock and scheduled appt   8/24/20 with Pau Hancock to discuss pain & poss PT   RT CMA

## 2020-08-17 ENCOUNTER — TELEPHONE (OUTPATIENT)
Dept: FAMILY MEDICINE CLINIC | Facility: CLINIC | Age: 30
End: 2020-08-17

## 2020-08-17 DIAGNOSIS — R51.9 CHRONIC NONINTRACTABLE HEADACHE, UNSPECIFIED HEADACHE TYPE: ICD-10-CM

## 2020-08-17 DIAGNOSIS — R10.13 DYSPEPSIA: Primary | ICD-10-CM

## 2020-08-17 DIAGNOSIS — G89.29 CHRONIC NONINTRACTABLE HEADACHE, UNSPECIFIED HEADACHE TYPE: ICD-10-CM

## 2020-08-18 ENCOUNTER — TELEPHONE (OUTPATIENT)
Dept: FAMILY MEDICINE CLINIC | Facility: CLINIC | Age: 30
End: 2020-08-18

## 2020-08-18 ENCOUNTER — TELEPHONE (OUTPATIENT)
Dept: NEUROLOGY | Facility: CLINIC | Age: 30
End: 2020-08-18

## 2020-08-18 NOTE — TELEPHONE ENCOUNTER
----- Message from Carrie Lloyd sent at 8/17/2020  8:29 AM EDT -----  Regarding: FW: Non-Urgent Medical Question  Contact: [phone]    ----- Message -----  From: Anali Dangelo  Sent: 8/14/2020   5:15 PM EDT  To: [facility]  Subject: Non-Urgent Medical Question                      Hi!  I have an appointment with you regarding my neck and back pain in a week and a half  But in the meantime, is there anything I can take for the pain? It's a gnawing ache in the back of my neck that also wraps around the top of the head occasionally  I've been trying motrin, tylenol and excedrin at intervals but it doesn't seem to help  Just wondering if there is anything else    Thanks so much,   Virginia Rios

## 2020-08-18 NOTE — TELEPHONE ENCOUNTER
Best contact number for patient:[phone]    Emergency Contact name and number:    Referring provider and telephone number:MARQUES Correia [facility] Provider Name and [facility]: Lauri Merritt    Reason for Appointment/Dx:Headaches    Neurology Location patient would like to be seen:    Order received? Yes                                                Records Received? No    Have you ever seen another Neurologist?       No    [facility]    ID/Policy #:    Secondary Insurance:    ID/Policy#: Workman's Comp/ Accident/ School  Information      Workman's Comp/Accident/School related?        No    If yes name of Insurance company:    Date of Injury:    Type of Injury:    [address] Name and Telephone Number:    Notes:Gabriel Reeves/fortino pt pack sent                    Appointment date: 01/20/21 8:00am

## 2020-08-21 DIAGNOSIS — R10.13 DYSPEPSIA: ICD-10-CM

## 2020-08-21 RX ORDER — OMEPRAZOLE 40 MG/1
CAPSULE, DELAYED RELEASE ORAL
Qty: 60 CAPSULE | Refills: 1 | Status: SHIPPED | OUTPATIENT
Start: 2020-08-21 | End: 2020-09-18 | Stop reason: DRUGHIGH

## 2020-09-08 DIAGNOSIS — N94.6 DYSMENORRHEA: ICD-10-CM

## 2020-09-08 DIAGNOSIS — R10.13 DYSPEPSIA: ICD-10-CM

## 2020-09-08 RX ORDER — FAMOTIDINE 40 MG/1
40 TABLET, FILM COATED ORAL DAILY
Qty: 30 TABLET | Refills: 0 | Status: SHIPPED | OUTPATIENT
Start: 2020-09-08 | End: 2020-10-26

## 2020-09-08 RX ORDER — OXYCODONE HYDROCHLORIDE AND ACETAMINOPHEN 5; 325 MG/1; MG/1
1 TABLET ORAL EVERY 6 HOURS PRN
Qty: 30 TABLET | Refills: 0 | Status: SHIPPED | OUTPATIENT
Start: 2020-09-08 | End: 2020-12-08 | Stop reason: SDUPTHER

## 2020-09-14 ENCOUNTER — TELEPHONE (OUTPATIENT)
Dept: FAMILY MEDICINE CLINIC | Facility: CLINIC | Age: 30
End: 2020-09-14

## 2020-09-14 NOTE — TELEPHONE ENCOUNTER
Attempted to call patient to set up virtual visit, but no answer and mailbox full  Will try again later

## 2020-09-15 ENCOUNTER — TELEMEDICINE (OUTPATIENT)
Dept: FAMILY MEDICINE CLINIC | Facility: CLINIC | Age: 30
End: 2020-09-15
Payer: COMMERCIAL

## 2020-09-15 VITALS — BODY MASS INDEX: 29.02 KG/M2 | HEIGHT: 64 IN | WEIGHT: 170 LBS | TEMPERATURE: 97 F

## 2020-09-15 DIAGNOSIS — R51.9 CHRONIC NONINTRACTABLE HEADACHE, UNSPECIFIED HEADACHE TYPE: Primary | ICD-10-CM

## 2020-09-15 DIAGNOSIS — G89.29 CHRONIC NONINTRACTABLE HEADACHE, UNSPECIFIED HEADACHE TYPE: Primary | ICD-10-CM

## 2020-09-15 DIAGNOSIS — G43.909 MIGRAINE WITHOUT STATUS MIGRAINOSUS, NOT INTRACTABLE, UNSPECIFIED MIGRAINE TYPE: ICD-10-CM

## 2020-09-15 PROCEDURE — 99213 OFFICE O/P EST LOW 20 MIN: CPT | Performed by: FAMILY MEDICINE

## 2020-09-15 RX ORDER — BUTALBITAL, ACETAMINOPHEN AND CAFFEINE 50; 325; 40 MG/1; MG/1; MG/1
1 TABLET ORAL 2 TIMES DAILY PRN
Qty: 30 TABLET | Refills: 0 | Status: SHIPPED | OUTPATIENT
Start: 2020-09-15 | End: 2021-02-28

## 2020-09-15 RX ORDER — BUTALBITAL, ACETAMINOPHEN AND CAFFEINE 50; 325; 40 MG/1; MG/1; MG/1
1 TABLET ORAL 2 TIMES DAILY PRN
Qty: 30 TABLET | Refills: 0 | Status: SHIPPED | OUTPATIENT
Start: 2020-09-15 | End: 2020-09-15 | Stop reason: SDUPTHER

## 2020-09-15 NOTE — TELEPHONE ENCOUNTER
Called Jorge Quintana again and set up virtual appt with Dr Jhonatan Oden for today  No further action required

## 2020-09-15 NOTE — PROGRESS NOTES
Virtual Regular Visit      Assessment/Plan:    Problem List Items Addressed This Visit        Cardiovascular and Mediastinum    Migraine without status migrainosus, not intractable    Relevant Medications    butalbital-acetaminophen-caffeine (FIORICET,ESGIC) -40 mg per tablet       Other    Chronic nonintractable headache - Primary    Relevant Medications    butalbital-acetaminophen-caffeine (FIORICET,ESGIC) -40 mg per tablet               Reason for visit is   Chief Complaint   Patient presents with    Migraine    Virtual Regular Visit        Encounter provider Narciso Cerrato MD    Provider located at [address] [place] [phone]      Recent Visits  Date Type Provider Dept   09/14/20 Telephone [address] [place] [address] Physicians   Showing recent visits within past 7 days and meeting all other requirements     Today's Visits  Date Type Provider Dept   09/15/20 Telemedicine Narciso Cerrato MD [facility]   Showing today's visits and meeting all other requirements     Future Appointments  No visits were found meeting these conditions  Showing future appointments within next 150 days and meeting all other requirements        The patient was identified by name and date of birth  Evelyn Lewis was informed that this is a telemedicine visit and that the visit is being conducted through [facility] and patient was informed that this is a secure, HIPAA-compliant platform  She agrees to proceed     My office door was closed  No one else was in the room  She acknowledged consent and understanding of privacy and security of the video platform  The patient has agreed to participate and understands they can discontinue the visit at any time  Patient is aware this is a billable service  Subjective  Evelyn Lewis is a 34 y o  female            DISCUSSION    RECENT INCREASE IN HEADACHES  HAD BEEN PAINTING INSIDE OF THE HOUSE  NO REAL CHANGES IN CHARACTER  BETTER NOW  SOME LIGHT SENSITIVITY  DENIES ANY NUMBNESS, TINGLING OR WEAKNESS       Past Medical History:   Diagnosis Date    Atopic dermatitis     Resolved: 2016    Bunion of great toe of right foot     Last Assessed: 2015    Chronic sinusitis     Resolved: [id number]    Herpes labialis     Resolved: [id number]    Kidney stone     Ovarian cyst     Peptic ulcer        Past Surgical History:   Procedure Laterality Date     SECTION  2015     SECTION  10/20/2017    SINUS SURGERY      WISDOM TOOTH EXTRACTION         Current Outpatient Medications   Medication Sig Dispense Refill    ALPRAZolam (XANAX) 0 25 mg tablet TAKE 1 TABLET(0 25 MG) BY MOUTH TWICE DAILY AS NEEDED FOR ANXIETY 30 tablet 0    Alum & Mag Hydroxide-Simeth (MYLANTA PO) Take 10 mL by mouth as needed (couple times per week)       aspirin-acetaminophen-caffeine (Excedrin Migraine) 250-250-65 MG per tablet Take 1 tablet by mouth as needed for headaches      famotidine (PEPCID) 40 MG tablet Take 1 tablet (40 mg total) by mouth daily 30 tablet 0    fluticasone (FLONASE) 50 mcg/act nasal spray 2 sprays into each nostril as needed   0    naloxone (NARCAN) 0 4 mg/mL injection Use as directed      omeprazole (PriLOSEC) 40 MG capsule TAKE 1 CAPSULE(40 MG) BY MOUTH TWICE DAILY 60 capsule 1    oxyCODONE-acetaminophen (PERCOCET) 5-325 mg per tablet Take 1 tablet by mouth every 6 (six) hours as needed for moderate painMax Daily Amount: 4 tablets 30 tablet 0    SUMAtriptan (IMITREX) 20 MG/ACT nasal spray 1 SPRAY PRN MIGRAINE MAY REPEAT X 1 IN 2 HRS IF HA IS NOT GONE 1 Inhaler 3    valACYclovir (VALTREX) 1,000 mg tablet TAKE 1 TABLET(1000 MG) BY MOUTH THREE TIMES DAILY FOR 7 DAYS 21 tablet 0    butalbital-acetaminophen-caffeine (FIORICET,ESGIC) -40 mg per tablet Take 1 tablet by mouth 2 (two) times a day as needed for headaches or migraine 30 tablet 0    esomeprazole (NEXIUM) 20 mg capsule Take 1 capsule (20 mg total) by mouth daily in the early morning (Patient not taking: Reported on 9/15/2020) 30 capsule 5     No current facility-administered medications for this visit  Allergies   Allergen Reactions    Elavil [Amitriptyline] Drowsiness    Prednisone GI Intolerance    Tramadol Itching    Vicodin [Hydrocodone-Acetaminophen] Itching    Latex Hives       Review of Systems   Constitutional: Negative for chills, fatigue and fever  HENT: Negative for congestion, ear discharge, ear pain, mouth sores, postnasal drip, sore throat and trouble swallowing  Eyes: Positive for photophobia  Negative for pain, discharge and visual disturbance  Respiratory: Negative for cough, shortness of breath and wheezing  Cardiovascular: Negative for chest pain, palpitations and leg swelling  Gastrointestinal: Negative for abdominal distention, abdominal pain, blood in stool, diarrhea and nausea  Endocrine: Negative for polydipsia, polyphagia and polyuria  Genitourinary: Negative for dysuria, frequency, hematuria and urgency  Musculoskeletal: Negative for arthralgias, gait problem and joint swelling  Skin: Negative for pallor and rash  Neurological: Positive for headaches  Negative for dizziness, syncope, speech difficulty, weakness, light-headedness and numbness  Hematological: Negative for adenopathy  Psychiatric/Behavioral: Negative for behavioral problems, confusion and sleep disturbance  The patient is not nervous/anxious  Video Exam    Vitals:    09/15/20 1207   Temp: (!) 97 °F (36 1 °C)   TempSrc: Oral   Weight: 77 1 kg (170 lb)   Height: 5' 4" (1 626 m)       Physical Exam     PATIENT VISUALLY APPEARS WELL IN NO OBVIOUS DISTRESS      I spent 20 minutes directly with the patient during this visit      VIRTUAL VISIT DISCLAIMER    Madison Barcenas acknowledges that she has consented to an online visit or consultation   She understands that the online visit is based solely on information provided by her, and that, in the absence of a face-to-face physical evaluation by the physician, the diagnosis she receives is both limited and provisional in terms of accuracy and completeness  This is not intended to replace a full medical face-to-face evaluation by the physician  Lenin Lopez understands and accepts these terms

## 2020-09-15 NOTE — PATIENT INSTRUCTIONS
PLENTY OF FLUIDS  REST  MONITOR HA TRIGGERS    TRIAL OF FIORCET    CALL WITH UPDATE IN A COUPLE OF WEEKS

## 2020-09-17 ENCOUNTER — TELEPHONE (OUTPATIENT)
Dept: GASTROENTEROLOGY | Facility: AMBULARY SURGERY CENTER | Age: 30
End: 2020-09-17

## 2020-09-17 NOTE — TELEPHONE ENCOUNTER
PT set up for TEAMS VR visit with dr David Mera on 9/18  Pt email and telephone number verified  Pt aware of VR visit instructions

## 2020-09-18 ENCOUNTER — TELEMEDICINE (OUTPATIENT)
Dept: GASTROENTEROLOGY | Facility: CLINIC | Age: 30
End: 2020-09-18
Payer: COMMERCIAL

## 2020-09-18 DIAGNOSIS — R19.7 DIARRHEA, UNSPECIFIED TYPE: ICD-10-CM

## 2020-09-18 DIAGNOSIS — K21.9 GASTROESOPHAGEAL REFLUX DISEASE, ESOPHAGITIS PRESENCE NOT SPECIFIED: Primary | ICD-10-CM

## 2020-09-18 DIAGNOSIS — R14.0 BLOATING: ICD-10-CM

## 2020-09-18 DIAGNOSIS — R10.13 EPIGASTRIC PAIN: ICD-10-CM

## 2020-09-18 DIAGNOSIS — R10.13 DYSPEPSIA: ICD-10-CM

## 2020-09-18 DIAGNOSIS — R11.0 NAUSEA: ICD-10-CM

## 2020-09-18 PROCEDURE — 99203 OFFICE O/P NEW LOW 30 MIN: CPT | Performed by: INTERNAL MEDICINE

## 2020-09-18 RX ORDER — OMEPRAZOLE 40 MG/1
40 CAPSULE, DELAYED RELEASE ORAL DAILY
Qty: 60 CAPSULE | Refills: 2 | Status: SHIPPED | OUTPATIENT
Start: 2020-09-18 | End: 2021-02-20 | Stop reason: SDUPTHER

## 2020-09-18 NOTE — PROGRESS NOTES
[facility] - Outpatient Consultation  John Storey 34 y o  female MRN: [id number]  Encounter: [id number]          ASSESSMENT AND PLAN:      1  GERD  - increase omeprazole to 40 mg twice per day  - stop pepcid  - lifestyle changes to reduce acid reflux    2  Abdominal pain, nausea  3  Dyspepsia  -epigastric pain, nausea and vomiting  - may be due to peptic ulcer disease/gastritis or GERD  -symptoms have improved slightly  - schedule EGD with gastric biopsies  - cut back NSAIDs  Acetaminophen/Tylenol up to 3000 mg per day  4  Diarrhea  - check blood and stool tests (Celiac ab profile, CRP, fecal calprotectin)- will send to [facility] in [place], [place]  - start fiber supplement (metamucil, citrucel, or benefiber) daily   - try imodium/loperamide 1 tab as needed for diarrhea up to 4 times per day    5  Co-morbidities: allergic rhinitis, Raynauds syndrome, migraine, anxiety    Follow up in 3-4 months    ______________________________________________________________________    HPI:  51-year-old woman with telemedicine visit for GERD, abdominal pain, and diarrhea    Co-morbidities: allergic rhinitis, Raynauds syndrome, migraine, anxiety    GERD  - chronic, at least since 12/2020  - heartburn and acid reflux almost daily   - on omeprazole 40 mg daily and pepcid 40 mg qhs for last 2 weeks; it is helping  - takes Excedrin about every other day    Abd pain  - epigastric pain, nausea and vomiting that was worse earlier   - occ still has epigastric and nausea  - was started on nexium 20 mg qd and carafate for 2 weeks which seemed to help  - +abd bloating  - +indigestion, belching'  - has tried Align (probiotics) but it did not helpful  - occ early satiety    Diarrhea  - chronic  - will have diarrhea 2 days out of the week  - 4-5 BMs per day, BSS 6  - on days without diarrhea will have 2 BMs per day, BSS 4  - no well water, camping/travel, sick contacts, antibiotics, steroids    ROS: No vomiting, constipation, odynophagia, dysphagia, hematemesis, melena, hematochezia, appetite changes, wt loss  FH: no colon cancer  SH: no tobacco, etoh- on holidays  Prior endoscopies:  EGD (for nausea) about 10 years ago that showed gastritis  REVIEW OF SYSTEMS:    CONSTITUTIONAL: Denies any fever, chills, rigors, and weight loss  HEENT: No earache or tinnitus  Denies hearing loss or visual disturbances  CARDIOVASCULAR: No chest pain or palpitations  RESPIRATORY: Denies any cough, hemoptysis, shortness of breath or dyspnea on exertion  GASTROINTESTINAL: As noted in the History of Present Illness  GENITOURINARY: No problems with urination  Denies any hematuria or dysuria  NEUROLOGIC: No dizziness or vertigo, denies headaches  MUSCULOSKELETAL: Denies any muscle or joint pain  SKIN: Denies skin rashes or itching  ENDOCRINE: Denies excessive thirst  Denies intolerance to heat or cold  PSYCHOSOCIAL: Denies depression or anxiety  Denies any recent memory loss         Historical Information   Past Medical History:   Diagnosis Date    Atopic dermatitis     Resolved: [id number]    Bunion of great toe of right foot     Last Assessed: [id number]    Chronic sinusitis     Resolved: [id number]    Herpes labialis     Resolved: [id number]    Kidney stone     Ovarian cyst     Peptic ulcer      Past Surgical History:   Procedure Laterality Date     SECTION  2015     SECTION  10/20/2017    SINUS SURGERY      WISDOM TOOTH EXTRACTION       Social History   Social History     Substance and Sexual Activity   Alcohol Use Yes    Frequency: Monthly or less    Drinks per session: 1 or 2    Comment: occasional     Social History     Substance and Sexual Activity   Drug Use No     Social History     Tobacco Use   Smoking Status Never Smoker   Smokeless Tobacco Never Used     Family History   Problem Relation Age of Onset    Hyperlipidemia Father     Hypertension Father     Asthma Mother    Jacey Boggs Breast cancer Paternal Grandmother     Breast cancer Paternal Aunt     Substance Abuse Neg Hx     Mental illness Neg Hx        Meds/Allergies       Current Outpatient Medications:     ALPRAZolam (XANAX) 0 25 mg tablet    Alum & Mag Hydroxide-Simeth (MYLANTA PO)    aspirin-acetaminophen-caffeine (Excedrin Migraine) 250-250-65 MG per tablet    butalbital-acetaminophen-caffeine (FIORICET,ESGIC) -40 mg per tablet    esomeprazole (NEXIUM) 20 mg capsule    famotidine (PEPCID) 40 MG tablet    fluticasone (FLONASE) 50 mcg/act nasal spray    naloxone (NARCAN) 0 4 mg/mL injection    omeprazole (PriLOSEC) 40 MG capsule    oxyCODONE-acetaminophen (PERCOCET) 5-325 mg per tablet    SUMAtriptan (IMITREX) 20 MG/ACT nasal spray    valACYclovir (VALTREX) 1,000 mg tablet    Allergies   Allergen Reactions    Elavil [Amitriptyline] Drowsiness    Prednisone GI Intolerance    Tramadol Itching    Vicodin [Hydrocodone-Acetaminophen] Itching    Latex Hives           Objective     There were no vitals taken for this visit  There is no height or weight on file to calculate BMI  PHYSICAL EXAM:      General Appearance:   Alert, cooperative, no distress   HEENT:   Normocephalic, atraumatic, anicteric  Neck:  Symmetrical, trachea midline   Lungs:   Video visit   Heart[de-identified]   Video visit   Abdomen:   Video visit   Rectal:   Deferred   Neuro:    Alert, oriented, no gross deficits   Extremities:  Video visit   Psych:  Normal mood and affect    Skin:  No jaundice, rashes, or lesions on visible areas   Lymph nodes:  Video visit       Lab Results:   No visits with results within 1 Day(s) from this visit  Latest known visit with results is:   Office Visit on 08/03/2020   Component Date Value    ECG Interp during Ex 08/03/2020 nl          Radiology Results:   No results found

## 2020-09-18 NOTE — PATIENT INSTRUCTIONS
GERD  - increase omeprazole to 40 mg twice per day  - stop pepcid  - lifestyle changes to reduce acid reflux: cut down on caffeine (coffee, tea, soda, chocolate), peppermint, spicy/greasy foods, trigger foods (citrus, red sauces); avoid laying down for 2-3 hours after meals; elevate the head of your bed; avoid tight clothing around abdomen; and lose weight    Abdominal pain, nausea  - schedule EGD (will COVID testing 1 week prior to procedure)  - cut back NSAIDs  Acetaminophen/Tylenol up to 3000 mg per day       Diarrhea  - check blood and stool tests (Celiac ab profile, CRP, fecal calprotectin)- [facility] [place] [place]  - start fiber supplement (metamucil, citrucel, or benefiber) daily (over the counter, generic)  - try imodium/loperamide 1 tab as needed for diarrhea up to 4 times per day    Follow up in 3-4 months

## 2020-10-13 ENCOUNTER — TELEPHONE (OUTPATIENT)
Dept: FAMILY MEDICINE CLINIC | Facility: CLINIC | Age: 30
End: 2020-10-13

## 2020-10-14 ENCOUNTER — OFFICE VISIT (OUTPATIENT)
Dept: FAMILY MEDICINE CLINIC | Facility: CLINIC | Age: 30
End: 2020-10-14
Payer: COMMERCIAL

## 2020-10-14 VITALS
DIASTOLIC BLOOD PRESSURE: 80 MMHG | RESPIRATION RATE: 16 BRPM | BODY MASS INDEX: 28.99 KG/M2 | HEART RATE: 86 BPM | SYSTOLIC BLOOD PRESSURE: 124 MMHG | TEMPERATURE: 98.7 F | OXYGEN SATURATION: 98 % | WEIGHT: 174 LBS | HEIGHT: 65 IN

## 2020-10-14 DIAGNOSIS — R51.9 CHRONIC NONINTRACTABLE HEADACHE, UNSPECIFIED HEADACHE TYPE: Primary | ICD-10-CM

## 2020-10-14 DIAGNOSIS — G89.29 CHRONIC NONINTRACTABLE HEADACHE, UNSPECIFIED HEADACHE TYPE: Primary | ICD-10-CM

## 2020-10-14 DIAGNOSIS — Z23 NEED FOR INFLUENZA VACCINATION: ICD-10-CM

## 2020-10-14 DIAGNOSIS — G43.909 MIGRAINE WITHOUT STATUS MIGRAINOSUS, NOT INTRACTABLE, UNSPECIFIED MIGRAINE TYPE: ICD-10-CM

## 2020-10-14 PROBLEM — N94.6 DYSMENORRHEA: Status: RESOLVED | Noted: 2019-08-01 | Resolved: 2020-10-14

## 2020-10-14 PROBLEM — R00.2 PALPITATIONS: Status: RESOLVED | Noted: 2020-03-17 | Resolved: 2020-10-14

## 2020-10-14 PROCEDURE — 1036F TOBACCO NON-USER: CPT | Performed by: FAMILY MEDICINE

## 2020-10-14 PROCEDURE — 99214 OFFICE O/P EST MOD 30 MIN: CPT | Performed by: FAMILY MEDICINE

## 2020-10-14 PROCEDURE — 90471 IMMUNIZATION ADMIN: CPT

## 2020-10-14 PROCEDURE — 90686 IIV4 VACC NO PRSV 0.5 ML IM: CPT

## 2020-10-14 RX ORDER — IMIPRAMINE HYDROCHLORIDE 10 MG/1
10 TABLET, FILM COATED ORAL
Qty: 30 TABLET | Refills: 1 | Status: SHIPPED | OUTPATIENT
Start: 2020-10-14 | End: 2020-11-24

## 2020-10-14 RX ORDER — IBUPROFEN 600 MG/1
600 TABLET ORAL
Qty: 28 TABLET | Refills: 1 | Status: SHIPPED | OUTPATIENT
Start: 2020-10-14 | End: 2021-02-28

## 2020-10-26 DIAGNOSIS — R10.13 DYSPEPSIA: ICD-10-CM

## 2020-10-26 RX ORDER — FAMOTIDINE 40 MG/1
TABLET, FILM COATED ORAL
Qty: 30 TABLET | Refills: 0 | Status: SHIPPED | OUTPATIENT
Start: 2020-10-26 | End: 2020-12-08 | Stop reason: SDUPTHER

## 2020-10-29 ENCOUNTER — TELEMEDICINE (OUTPATIENT)
Dept: FAMILY MEDICINE CLINIC | Facility: CLINIC | Age: 30
End: 2020-10-29
Payer: COMMERCIAL

## 2020-10-29 VITALS — WEIGHT: 174 LBS | TEMPERATURE: 97.8 F | HEIGHT: 65 IN | BODY MASS INDEX: 28.99 KG/M2

## 2020-10-29 DIAGNOSIS — G43.909 MIGRAINE WITHOUT STATUS MIGRAINOSUS, NOT INTRACTABLE, UNSPECIFIED MIGRAINE TYPE: ICD-10-CM

## 2020-10-29 DIAGNOSIS — G89.29 CHRONIC NONINTRACTABLE HEADACHE, UNSPECIFIED HEADACHE TYPE: Primary | ICD-10-CM

## 2020-10-29 DIAGNOSIS — R51.9 CHRONIC NONINTRACTABLE HEADACHE, UNSPECIFIED HEADACHE TYPE: Primary | ICD-10-CM

## 2020-10-29 PROCEDURE — 99213 OFFICE O/P EST LOW 20 MIN: CPT | Performed by: FAMILY MEDICINE

## 2020-11-12 ENCOUNTER — TELEPHONE (OUTPATIENT)
Dept: FAMILY MEDICINE CLINIC | Facility: CLINIC | Age: 30
End: 2020-11-12

## 2020-11-12 ENCOUNTER — TELEMEDICINE (OUTPATIENT)
Dept: FAMILY MEDICINE CLINIC | Facility: CLINIC | Age: 30
End: 2020-11-12
Payer: COMMERCIAL

## 2020-11-12 VITALS — BODY MASS INDEX: 27.49 KG/M2 | WEIGHT: 165 LBS | HEIGHT: 65 IN

## 2020-11-12 DIAGNOSIS — G43.909 MIGRAINE WITHOUT STATUS MIGRAINOSUS, NOT INTRACTABLE, UNSPECIFIED MIGRAINE TYPE: Primary | ICD-10-CM

## 2020-11-12 DIAGNOSIS — R10.13 DYSPEPSIA: ICD-10-CM

## 2020-11-12 PROCEDURE — 3008F BODY MASS INDEX DOCD: CPT | Performed by: FAMILY MEDICINE

## 2020-11-12 PROCEDURE — 1036F TOBACCO NON-USER: CPT | Performed by: FAMILY MEDICINE

## 2020-11-12 PROCEDURE — 99213 OFFICE O/P EST LOW 20 MIN: CPT | Performed by: FAMILY MEDICINE

## 2020-11-24 DIAGNOSIS — F41.9 ANXIETY: ICD-10-CM

## 2020-11-24 DIAGNOSIS — R51.9 CHRONIC NONINTRACTABLE HEADACHE, UNSPECIFIED HEADACHE TYPE: ICD-10-CM

## 2020-11-24 DIAGNOSIS — G43.909 MIGRAINE WITHOUT STATUS MIGRAINOSUS, NOT INTRACTABLE, UNSPECIFIED MIGRAINE TYPE: ICD-10-CM

## 2020-11-24 DIAGNOSIS — G89.29 CHRONIC NONINTRACTABLE HEADACHE, UNSPECIFIED HEADACHE TYPE: ICD-10-CM

## 2020-11-24 RX ORDER — ALPRAZOLAM 0.25 MG/1
TABLET ORAL
Qty: 30 TABLET | Refills: 0 | Status: SHIPPED | OUTPATIENT
Start: 2020-11-24

## 2020-11-24 RX ORDER — IMIPRAMINE HYDROCHLORIDE 10 MG/1
TABLET, FILM COATED ORAL
Qty: 30 TABLET | Refills: 1 | Status: SHIPPED | OUTPATIENT
Start: 2020-11-24 | End: 2021-03-23

## 2020-12-08 DIAGNOSIS — N94.6 DYSMENORRHEA: ICD-10-CM

## 2020-12-08 DIAGNOSIS — R10.13 DYSPEPSIA: ICD-10-CM

## 2020-12-08 RX ORDER — FAMOTIDINE 40 MG/1
40 TABLET, FILM COATED ORAL DAILY
Qty: 30 TABLET | Refills: 0 | Status: SHIPPED | OUTPATIENT
Start: 2020-12-08 | End: 2021-01-03

## 2020-12-08 RX ORDER — OXYCODONE HYDROCHLORIDE AND ACETAMINOPHEN 5; 325 MG/1; MG/1
1 TABLET ORAL EVERY 6 HOURS PRN
Qty: 30 TABLET | Refills: 0 | Status: SHIPPED | OUTPATIENT
Start: 2020-12-08 | End: 2021-02-20 | Stop reason: SDUPTHER

## 2020-12-09 DIAGNOSIS — B37.3 YEAST VAGINITIS: ICD-10-CM

## 2020-12-09 RX ORDER — FLUCONAZOLE 150 MG/1
TABLET ORAL
Qty: 2 TABLET | Refills: 3 | Status: SHIPPED | OUTPATIENT
Start: 2020-12-09 | End: 2022-06-23

## 2020-12-15 ENCOUNTER — TELEPHONE (OUTPATIENT)
Dept: NEUROLOGY | Facility: CLINIC | Age: 30
End: 2020-12-15

## 2021-01-01 DIAGNOSIS — R10.13 DYSPEPSIA: ICD-10-CM

## 2021-01-03 RX ORDER — FAMOTIDINE 40 MG/1
TABLET, FILM COATED ORAL
Qty: 30 TABLET | Refills: 0 | Status: SHIPPED | OUTPATIENT
Start: 2021-01-03 | End: 2021-01-29

## 2021-01-15 ENCOUNTER — TELEPHONE (OUTPATIENT)
Dept: NEUROLOGY | Facility: CLINIC | Age: 31
End: 2021-01-15

## 2021-01-15 NOTE — TELEPHONE ENCOUNTER
THE [facility] for patient to [facility] and confirm appointment with Dr Joel Vyas  Gave direct numbers in [place]

## 2021-01-18 NOTE — TELEPHONE ENCOUNTER
Pt  Returned call and confirmed her 8am appt     She also stated she was referred by her PCP and has not seen any other neurologists

## 2021-01-20 ENCOUNTER — TELEMEDICINE (OUTPATIENT)
Dept: NEUROLOGY | Facility: CLINIC | Age: 31
End: 2021-01-20
Payer: COMMERCIAL

## 2021-01-20 VITALS
WEIGHT: 155 LBS | BODY MASS INDEX: 25.83 KG/M2 | SYSTOLIC BLOOD PRESSURE: 122 MMHG | HEART RATE: 97 BPM | DIASTOLIC BLOOD PRESSURE: 80 MMHG | HEIGHT: 65 IN

## 2021-01-20 DIAGNOSIS — G43.019 INTRACTABLE MIGRAINE WITHOUT AURA AND WITHOUT STATUS MIGRAINOSUS: ICD-10-CM

## 2021-01-20 PROCEDURE — 3008F BODY MASS INDEX DOCD: CPT | Performed by: PSYCHIATRY & NEUROLOGY

## 2021-01-20 PROCEDURE — 99244 OFF/OP CNSLTJ NEW/EST MOD 40: CPT | Performed by: PSYCHIATRY & NEUROLOGY

## 2021-01-20 PROCEDURE — 1036F TOBACCO NON-USER: CPT | Performed by: PSYCHIATRY & NEUROLOGY

## 2021-01-20 RX ORDER — PROPRANOLOL HCL 60 MG
60 CAPSULE, EXTENDED RELEASE 24HR ORAL
Qty: 30 CAPSULE | Refills: 2 | Status: SHIPPED | OUTPATIENT
Start: 2021-01-20 | End: 2021-02-28

## 2021-01-20 RX ORDER — ERENUMAB-AOOE 140 MG/ML
140 INJECTION, SOLUTION SUBCUTANEOUS
Qty: 2 PEN | Refills: 2 | Status: SHIPPED | OUTPATIENT
Start: 2021-01-20 | End: 2021-02-28

## 2021-01-20 NOTE — PROGRESS NOTES
Virtual Regular Visit      Assessment/Plan:    Problem List Items Addressed This Visit        Other    Chronic nonintractable headache    Relevant Medications    propranolol (INDERAL LA) 60 mg 24 hr capsule    Erenumab-aooe (Aimovig) 140 MG/ML SOAJ        Patient suffers from frequent headaches that are most likely migraines based on features and family history  Will use propranolol for prevention  Will try for Aimovig 140mg for prevention as propranolol will not be sufficient to control her headaches  She may use prn imitrex  Reason for visit is   Chief Complaint   Patient presents with    Headache     5x per week rated 5-7/10 on average        Encounter provider Ivy Valdez MD    Provider located at [address] [place] [id number] [phone]      Recent Visits  Date Type Provider Dept   [facility] recent visits within past 7 days and meeting all other requirements     Today's Visits  Date Type Provider Dept   01/20/21 Telemedicine Ivy Valdez [facility]   Showing today's visits and meeting all other requirements     Future Appointments  No visits were found meeting these conditions  Showing future appointments within next 150 days and meeting all other requirements        The patient was identified by name and date of birth  Margaret Graham was informed that this is a telemedicine visit and that the visit is being conducted through [employer] and patient was informed that this is a secure, HIPAA-compliant platform  She agrees to proceed     My office door was closed  No one else was in the room  She acknowledged consent and understanding of privacy and security of the video platform  The patient has agreed to participate and understands they can discontinue the visit at any time  Patient is aware this is a billable service  Subjective/HPI  Merlinda Gilford is a 31 y o  female who is being evaluated for headaches  She has h/o migraines since age of 18  They were episodic and controlled with otc nsaids  Then for past 10 years, frequency is about 2 a week  Since Oct patient was prescribed Tofranil which was helping until recently  She describes her headaches as starting as ache in bitemporal region, at times ice pick type on either side  There is associated nausea, light sensitivity  Weather change can be trigger  She has tried diet restrictions  Strong smell of onions can trigger them  Now frequency of headaches is 5/week and 2 migraines a week  Excedrin helps only temporarily  She's tried topamax but side effects were too severe for her to continue  Imipramine is being used as preventive medication  Imitrex helps to some extent  Her MRI brain has been normal      Her father and paternal aunt have had migraines          Past Medical History:   Diagnosis Date    Atopic dermatitis     Resolved: [id number]    Bunion of great toe of right foot     Last Assessed: [id number]    Chronic sinusitis     Resolved: [id number]    Herpes labialis     Resolved: [id number]    Kidney stone     Ovarian cyst     Peptic ulcer        Past Surgical History:   Procedure Laterality Date     SECTION  2015     SECTION  10/20/2017    SINUS SURGERY      WISDOM TOOTH EXTRACTION         Current Outpatient Medications   Medication Sig Dispense Refill    ALPRAZolam (XANAX) 0 25 mg tablet TAKE 1 TABLET(0 25 MG) BY MOUTH TWICE DAILY AS NEEDED FOR ANXIETY 30 tablet 0    Alum & Mag Hydroxide-Simeth (MYLANTA PO) Take 10 mL by mouth as needed (couple times per week)       aspirin-acetaminophen-caffeine (Excedrin Migraine) 250-250-65 MG per tablet Take 1 tablet by mouth as needed for headaches      famotidine (PEPCID) 40 MG tablet TAKE 1 TABLET(40 MG) BY MOUTH DAILY 30 tablet 0    fluticasone (FLONASE) 50 mcg/act nasal spray 2 sprays into each nostril as needed   0    ibuprofen (MOTRIN) 600 mg tablet Take 1 tablet (600 mg total) by mouth 2 (two) times daily after meals (Patient taking differently: Take by mouth daily as needed ) 28 tablet 1    imipramine (TOFRANIL) 10 mg tablet TAKE 1 TABLET(10 MG) BY MOUTH DAILY AT BEDTIME 30 tablet 1    naloxone (NARCAN) 0 4 mg/mL injection Use as directed      omeprazole (PriLOSEC) 40 MG capsule Take 1 capsule (40 mg total) by mouth daily 60 capsule 2    oxyCODONE-acetaminophen (PERCOCET) 5-325 mg per tablet Take 1 tablet by mouth every 6 (six) hours as needed for moderate painMax Daily Amount: 4 tablets 30 tablet 0    butalbital-acetaminophen-caffeine (FIORICET,ESGIC) -40 mg per tablet Take 1 tablet by mouth 2 (two) times a day as needed for headaches or migraine (Patient not taking: Reported on 10/29/2020) 30 tablet 0    Erenumab-aooe (Aimovig) 140 MG/ML SOAJ Inject 140 mg under the skin every 30 (thirty) days 2 pen 2    esomeprazole (NEXIUM) 20 mg capsule Take 1 capsule (20 mg total) by mouth daily in the early morning (Patient not taking: Reported on 9/15/2020) 30 capsule 5    propranolol (INDERAL LA) 60 mg 24 hr capsule Take 1 capsule (60 mg total) by mouth daily after breakfast 30 capsule 2    SUMAtriptan (IMITREX) 20 MG/ACT nasal spray 1 SPRAY PRN MIGRAINE MAY REPEAT X 1 IN 2 HRS IF HA IS NOT GONE (Patient not taking: Reported on 10/14/2020) 1 Inhaler 3    valACYclovir (VALTREX) 1,000 mg tablet TAKE 1 TABLET(1000 MG) BY MOUTH THREE TIMES DAILY FOR 7 DAYS (Patient not taking: Reported on 10/14/2020) 21 tablet 0     No current facility-administered medications for this visit  Allergies   Allergen Reactions    Elavil [Amitriptyline] Drowsiness    Prednisone GI Intolerance    Tramadol Itching    Vicodin [Hydrocodone-Acetaminophen] Itching    Latex Hives       Review of Systems   Neurological: Positive for headaches  All other systems reviewed and are negative        Video Exam    Vitals:    01/20/21 0807   BP: 122/80   BP Location: Left arm   Patient Position: Sitting   Cuff Size: Adult   Pulse: 97   Weight: 70 3 kg (155 lb)   Height: 5' 5" (1 651 m)       Physical Exam  Constitutional:       Appearance: Normal appearance  She is normal weight  Neurological:      General: No focal deficit present  Mental Status: She is alert and oriented to person, place, and time  Mental status is at baseline  Cranial Nerves: No cranial nerve deficit  Psychiatric:         Mood and Affect: Mood normal           I spent 45 minutes with patient today in which greater than 50% of the time was spent in counseling/coordination of care regarding her condition and plan  VIRTUAL VISIT DISCLAIMER    Shena Fuentes acknowledges that she has consented to an online visit or consultation  She understands that the online visit is based solely on information provided by her, and that, in the absence of a face-to-face physical evaluation by the physician, the diagnosis she receives is both limited and provisional in terms of accuracy and completeness  This is not intended to replace a full medical face-to-face evaluation by the physician  Shena Fuentes understands and accepts these terms

## 2021-01-29 DIAGNOSIS — R10.13 DYSPEPSIA: ICD-10-CM

## 2021-01-29 RX ORDER — FAMOTIDINE 40 MG/1
TABLET, FILM COATED ORAL
Qty: 30 TABLET | Refills: 0 | Status: SHIPPED | OUTPATIENT
Start: 2021-01-29 | End: 2021-03-08

## 2021-02-20 DIAGNOSIS — N94.6 DYSMENORRHEA: ICD-10-CM

## 2021-02-20 DIAGNOSIS — K21.9 GASTROESOPHAGEAL REFLUX DISEASE WITHOUT ESOPHAGITIS: ICD-10-CM

## 2021-02-22 RX ORDER — OXYCODONE HYDROCHLORIDE AND ACETAMINOPHEN 5; 325 MG/1; MG/1
1 TABLET ORAL EVERY 6 HOURS PRN
Qty: 30 TABLET | Refills: 0 | Status: SHIPPED | OUTPATIENT
Start: 2021-02-22 | End: 2021-05-16 | Stop reason: SDUPTHER

## 2021-02-28 PROBLEM — Z23 NEED FOR INFLUENZA VACCINATION: Status: RESOLVED | Noted: 2020-10-14 | Resolved: 2021-02-28

## 2021-02-28 PROBLEM — M79.10 MYALGIA: Status: ACTIVE | Noted: 2021-02-28

## 2021-02-28 PROBLEM — M54.50 LOW BACK PAIN: Status: ACTIVE | Noted: 2021-02-28

## 2021-02-28 PROBLEM — R39.9 UTI SYMPTOMS: Status: ACTIVE | Noted: 2021-02-28

## 2021-03-01 ENCOUNTER — OFFICE VISIT (OUTPATIENT)
Dept: FAMILY MEDICINE CLINIC | Facility: CLINIC | Age: 31
End: 2021-03-01
Payer: COMMERCIAL

## 2021-03-01 VITALS
TEMPERATURE: 98.6 F | HEART RATE: 101 BPM | SYSTOLIC BLOOD PRESSURE: 128 MMHG | OXYGEN SATURATION: 98 % | DIASTOLIC BLOOD PRESSURE: 80 MMHG | HEIGHT: 64 IN | WEIGHT: 169 LBS | BODY MASS INDEX: 28.85 KG/M2 | RESPIRATION RATE: 16 BRPM

## 2021-03-01 DIAGNOSIS — R39.9 UTI SYMPTOMS: Primary | ICD-10-CM

## 2021-03-01 DIAGNOSIS — M54.50 LOW BACK PAIN, UNSPECIFIED BACK PAIN LATERALITY, UNSPECIFIED CHRONICITY, UNSPECIFIED WHETHER SCIATICA PRESENT: ICD-10-CM

## 2021-03-01 DIAGNOSIS — M79.10 MYALGIA: ICD-10-CM

## 2021-03-01 LAB
SL AMB  POCT GLUCOSE, UA: 0
SL AMB LEUKOCYTE ESTERASE,UA: 0
SL AMB POCT BILIRUBIN,UA: 0
SL AMB POCT BLOOD,UA: 250
SL AMB POCT CLARITY,UA: CLEAR
SL AMB POCT COLOR,UA: YELLOW
SL AMB POCT KETONES,UA: 0
SL AMB POCT NITRITE,UA: 0
SL AMB POCT PH,UA: 5
SL AMB POCT SPECIFIC GRAVITY,UA: 1.02
SL AMB POCT URINE PROTEIN: 0
SL AMB POCT UROBILINOGEN: 0

## 2021-03-01 PROCEDURE — 99213 OFFICE O/P EST LOW 20 MIN: CPT | Performed by: FAMILY MEDICINE

## 2021-03-01 PROCEDURE — 1036F TOBACCO NON-USER: CPT | Performed by: FAMILY MEDICINE

## 2021-03-01 PROCEDURE — 81003 URINALYSIS AUTO W/O SCOPE: CPT | Performed by: FAMILY MEDICINE

## 2021-03-01 PROCEDURE — 3008F BODY MASS INDEX DOCD: CPT | Performed by: FAMILY MEDICINE

## 2021-03-01 PROCEDURE — 3725F SCREEN DEPRESSION PERFORMED: CPT | Performed by: FAMILY MEDICINE

## 2021-03-01 RX ORDER — CIPROFLOXACIN 250 MG/1
250 TABLET, FILM COATED ORAL EVERY 12 HOURS SCHEDULED
Qty: 14 TABLET | Refills: 0 | Status: SHIPPED | OUTPATIENT
Start: 2021-03-01 | End: 2021-03-08

## 2021-03-01 NOTE — PROGRESS NOTES
BMI Counseling: Body mass index is 29 01 kg/m²  The BMI is above normal  Nutrition recommendations include encouraging healthy choices of fruits and vegetables and moderation in carbohydrate intake  Exercise recommendations include exercising 3-5 times per week  No pharmacotherapy was ordered  Assessment/Plan:    No problem-specific Assessment & Plan notes found for this encounter  Diagnoses and all orders for this visit:    UTI symptoms  -     POCT urine dip auto non-scope  -     Cancel: Urine culture  -     Cancel: Urine culture  -     Urine culture  -     ciprofloxacin (CIPRO) 250 mg tablet; Take 1 tablet (250 mg total) by mouth every 12 (twelve) hours for 7 days    Myalgia  -     ciprofloxacin (CIPRO) 250 mg tablet; Take 1 tablet (250 mg total) by mouth every 12 (twelve) hours for 7 days    Low back pain, unspecified back pain laterality, unspecified chronicity, unspecified whether sciatica present  -     ciprofloxacin (CIPRO) 250 mg tablet; Take 1 tablet (250 mg total) by mouth every 12 (twelve) hours for 7 days          Patient Instructions   PLENTY OF FLUIDS  REST  URINE CULTURE WILL BE OBTAINED  MEDICATION AS DIRECTED        Return if symptoms worsen or fail to improve, for Next scheduled follow up  Subjective:      Patient ID: Fan Gabriel is a 31 y o  female  Chief Complaint   Patient presents with    Urinary Frequency    Back Pain    Generalized Body Aches     chills intermittently    COVID ANTIBODIES     would like to be tested       Back Pain  This is a recurrent problem  The current episode started in the past 7 days  The problem occurs 2 to 4 times per day  The problem is unchanged  The pain is present in the thoracic spine  The quality of the pain is described as aching  The pain does not radiate  The pain is at a severity of 3/10  The pain is the same all the time  The symptoms are aggravated by position  Stiffness is present all day   Pertinent negatives include no abdominal pain, chest pain, fever or numbness  Urinary Tract Infection   This is a new problem  The current episode started in the past 7 days  The problem occurs intermittently  The problem has been unchanged  The pain is mild  There has been no fever  She is sexually active  There is no history of pyelonephritis  Associated symptoms include flank pain, frequency, hesitancy and urgency  Pertinent negatives include no chills, discharge, hematuria, nausea, sweats or vomiting  She has tried nothing for the symptoms  The following portions of the patient's history were reviewed and updated as appropriate: allergies, current medications, past family history, past medical history, past social history, past surgical history and problem list     Review of Systems   Constitutional: Negative for chills and fever  HENT: Negative for congestion and sore throat  Eyes: Negative for discharge  Respiratory: Negative for chest tightness  Cardiovascular: Negative for chest pain and palpitations  Gastrointestinal: Negative for abdominal pain, diarrhea, nausea and vomiting  Genitourinary: Positive for flank pain, frequency, hesitancy and urgency  Negative for hematuria  Musculoskeletal: Positive for back pain  Negative for arthralgias and joint swelling  Neurological: Negative for numbness  Psychiatric/Behavioral: The patient is not nervous/anxious            Current Outpatient Medications   Medication Sig Dispense Refill    ALPRAZolam (XANAX) 0 25 mg tablet TAKE 1 TABLET(0 25 MG) BY MOUTH TWICE DAILY AS NEEDED FOR ANXIETY 30 tablet 0    Alum & Mag Hydroxide-Simeth (MYLANTA PO) Take 10 mL by mouth as needed (couple times per week)       aspirin-acetaminophen-caffeine (Excedrin Migraine) 250-250-65 MG per tablet Take 1 tablet by mouth as needed for headaches      famotidine (PEPCID) 40 MG tablet TAKE 1 TABLET(40 MG) BY MOUTH DAILY 30 tablet 0    fluticasone (FLONASE) 50 mcg/act nasal spray 2 sprays into each nostril as needed   0    imipramine (TOFRANIL) 10 mg tablet TAKE 1 TABLET(10 MG) BY MOUTH DAILY AT BEDTIME 30 tablet 1    omeprazole (PriLOSEC) 40 MG capsule Take 1 capsule (40 mg total) by mouth daily 60 capsule 2    oxyCODONE-acetaminophen (PERCOCET) 5-325 mg per tablet Take 1 tablet by mouth every 6 (six) hours as needed for moderate painMax Daily Amount: 4 tablets 30 tablet 0    ciprofloxacin (CIPRO) 250 mg tablet Take 1 tablet (250 mg total) by mouth every 12 (twelve) hours for 7 days 14 tablet 0     No current facility-administered medications for this visit  Objective:    /80   Pulse 101   Temp 98 6 °F (37 °C) (Temporal)   Resp 16   Ht 5' 4" (1 626 m)   Wt 76 7 kg (169 lb)   SpO2 98%   BMI 29 01 kg/m²        Physical Exam  Constitutional:       Appearance: She is well-developed  HENT:      Head: Normocephalic and atraumatic  Eyes:      General:         Right eye: No discharge  Left eye: No discharge  Conjunctiva/sclera: Conjunctivae normal       Pupils: Pupils are equal, round, and reactive to light  Neck:      Musculoskeletal: Normal range of motion and neck supple  Thyroid: No thyromegaly  Cardiovascular:      Rate and Rhythm: Normal rate and regular rhythm  Heart sounds: Normal heart sounds  No murmur  Pulmonary:      Effort: Pulmonary effort is normal  No respiratory distress  Breath sounds: Normal breath sounds  No wheezing or rales  Abdominal:      General: Bowel sounds are normal       Palpations: Abdomen is soft  Tenderness: There is no abdominal tenderness  Musculoskeletal: Normal range of motion  General: No tenderness  Lymphadenopathy:      Cervical: No cervical adenopathy  Skin:     General: Skin is warm and dry  Findings: No erythema or rash  Neurological:      Mental Status: She is alert and oriented to person, place, and time     Psychiatric:         Behavior: Behavior normal          Thought Content: Thought content normal          Judgment: Judgment normal                 Arlin Mcclendon MD

## 2021-03-02 LAB
BACTERIA UR CULT: NORMAL
Lab: NO GROWTH

## 2021-03-08 DIAGNOSIS — R10.13 DYSPEPSIA: ICD-10-CM

## 2021-03-08 DIAGNOSIS — G43.909 MIGRAINE WITHOUT STATUS MIGRAINOSUS, NOT INTRACTABLE, UNSPECIFIED MIGRAINE TYPE: Primary | ICD-10-CM

## 2021-03-08 DIAGNOSIS — M79.10 MYALGIA: ICD-10-CM

## 2021-03-08 DIAGNOSIS — R53.83 FATIGUE, UNSPECIFIED TYPE: ICD-10-CM

## 2021-03-08 RX ORDER — OMEPRAZOLE 40 MG/1
40 CAPSULE, DELAYED RELEASE ORAL DAILY
Qty: 90 CAPSULE | Refills: 1 | Status: SHIPPED | OUTPATIENT
Start: 2021-03-08 | End: 2021-08-06

## 2021-03-08 RX ORDER — FAMOTIDINE 40 MG/1
TABLET, FILM COATED ORAL
Qty: 30 TABLET | Refills: 0 | Status: SHIPPED | OUTPATIENT
Start: 2021-03-08 | End: 2021-04-19

## 2021-03-17 LAB
ALBUMIN SERPL-MCNC: 4.7 G/DL (ref 3.9–5)
ALBUMIN/GLOB SERPL: 1.7 {RATIO} (ref 1.2–2.2)
ALP SERPL-CCNC: 80 IU/L (ref 39–117)
ALT SERPL-CCNC: 11 IU/L (ref 0–32)
AST SERPL-CCNC: 16 IU/L (ref 0–40)
BASOPHILS # BLD AUTO: 0 X10E3/UL (ref 0–0.2)
BASOPHILS NFR BLD AUTO: 1 %
BILIRUB SERPL-MCNC: 0.3 MG/DL (ref 0–1.2)
BUN SERPL-MCNC: 13 MG/DL (ref 6–20)
BUN/CREAT SERPL: 20 (ref 9–23)
CALCIUM SERPL-MCNC: 9.7 MG/DL (ref 8.7–10.2)
CHLORIDE SERPL-SCNC: 99 MMOL/L (ref 96–106)
CO2 SERPL-SCNC: 26 MMOL/L (ref 20–29)
CREAT SERPL-MCNC: 0.65 MG/DL (ref 0.57–1)
CRP SERPL-MCNC: 1 MG/L (ref 0–10)
EOSINOPHIL # BLD AUTO: 0.1 X10E3/UL (ref 0–0.4)
EOSINOPHIL NFR BLD AUTO: 1 %
ERYTHROCYTE [DISTWIDTH] IN BLOOD BY AUTOMATED COUNT: 12.7 % (ref 11.7–15.4)
ERYTHROCYTE [SEDIMENTATION RATE] IN BLOOD BY WESTERGREN METHOD: 23 MM/HR (ref 0–32)
GLOBULIN SER-MCNC: 2.8 G/DL (ref 1.5–4.5)
GLUCOSE SERPL-MCNC: 89 MG/DL (ref 65–99)
HCT VFR BLD AUTO: 40.1 % (ref 34–46.6)
HGB BLD-MCNC: 13.5 G/DL (ref 11.1–15.9)
IMM GRANULOCYTES # BLD: 0 X10E3/UL (ref 0–0.1)
IMM GRANULOCYTES NFR BLD: 0 %
LYMPHOCYTES # BLD AUTO: 1.8 X10E3/UL (ref 0.7–3.1)
LYMPHOCYTES NFR BLD AUTO: 26 %
MCH RBC QN AUTO: 27.8 PG (ref 26.6–33)
MCHC RBC AUTO-ENTMCNC: 33.7 G/DL (ref 31.5–35.7)
MCV RBC AUTO: 83 FL (ref 79–97)
MONOCYTES # BLD AUTO: 0.4 X10E3/UL (ref 0.1–0.9)
MONOCYTES NFR BLD AUTO: 6 %
NEUTROPHILS # BLD AUTO: 4.6 X10E3/UL (ref 1.4–7)
NEUTROPHILS NFR BLD AUTO: 66 %
PLATELET # BLD AUTO: 328 X10E3/UL (ref 150–450)
POTASSIUM SERPL-SCNC: 4.6 MMOL/L (ref 3.5–5.2)
PROT SERPL-MCNC: 7.5 G/DL (ref 6–8.5)
RBC # BLD AUTO: 4.85 X10E6/UL (ref 3.77–5.28)
SL AMB EGFR AFRICAN AMERICAN: 140 ML/MIN/1.73
SL AMB EGFR NON AFRICAN AMERICAN: 121 ML/MIN/1.73
SODIUM SERPL-SCNC: 136 MMOL/L (ref 134–144)
TSH SERPL DL<=0.005 MIU/L-ACNC: 2 UIU/ML (ref 0.45–4.5)
WBC # BLD AUTO: 7 X10E3/UL (ref 3.4–10.8)

## 2021-03-23 DIAGNOSIS — G43.909 MIGRAINE WITHOUT STATUS MIGRAINOSUS, NOT INTRACTABLE, UNSPECIFIED MIGRAINE TYPE: ICD-10-CM

## 2021-03-23 DIAGNOSIS — R51.9 CHRONIC NONINTRACTABLE HEADACHE, UNSPECIFIED HEADACHE TYPE: ICD-10-CM

## 2021-03-23 DIAGNOSIS — G89.29 CHRONIC NONINTRACTABLE HEADACHE, UNSPECIFIED HEADACHE TYPE: ICD-10-CM

## 2021-03-23 RX ORDER — IMIPRAMINE HYDROCHLORIDE 10 MG/1
TABLET, FILM COATED ORAL
Qty: 30 TABLET | Refills: 1 | Status: SHIPPED | OUTPATIENT
Start: 2021-03-23 | End: 2021-06-15

## 2021-04-06 DIAGNOSIS — Z23 ENCOUNTER FOR IMMUNIZATION: ICD-10-CM

## 2021-04-14 ENCOUNTER — TELEMEDICINE (OUTPATIENT)
Dept: FAMILY MEDICINE CLINIC | Facility: CLINIC | Age: 31
End: 2021-04-14
Payer: COMMERCIAL

## 2021-04-14 VITALS — HEIGHT: 64 IN | BODY MASS INDEX: 28.85 KG/M2 | WEIGHT: 169 LBS

## 2021-04-14 DIAGNOSIS — F41.9 ANXIETY: Primary | ICD-10-CM

## 2021-04-14 PROCEDURE — 1036F TOBACCO NON-USER: CPT | Performed by: FAMILY MEDICINE

## 2021-04-14 PROCEDURE — 3008F BODY MASS INDEX DOCD: CPT | Performed by: FAMILY MEDICINE

## 2021-04-14 PROCEDURE — 99213 OFFICE O/P EST LOW 20 MIN: CPT | Performed by: FAMILY MEDICINE

## 2021-04-14 NOTE — PROGRESS NOTES
Virtual Regular Visit      Assessment/Plan:    Problem List Items Addressed This Visit        Other    Anxiety - Primary               Reason for visit is   Chief Complaint   Patient presents with    [facility] concerns     [facility]    Virtual Regular Visit        Encounter provider Rei Araya MD    Provider located at [address] [place] [id number]      Recent Visits  No visits were found meeting these conditions  Showing recent visits within past 7 days and meeting all other requirements     Today's Visits  Date Type Provider Dept   04/14/21 Telemedicine Rei Araya MD [facility]   Showing today's visits and meeting all other requirements     Future Appointments  No visits were found meeting these conditions  Showing future appointments within next 150 days and meeting all other requirements        The patient was identified by name and date of birth  Randi Ahuja was informed that this is a telemedicine visit and that the visit is being conducted through [facility] and patient was informed that this is a secure, HIPAA-compliant platform  She agrees to proceed     My office door was closed  No one else was in the room  She acknowledged consent and understanding of privacy and security of the video platform  The patient has agreed to participate and understands they can discontinue the visit at any time  Patient is aware this is a billable service  Subjective  Randi Ahuja is a 31 y o  female            PATIENT HAS SOME VACCINE CONCERNS  ANXIOUS    REVIEWED DATA  RECOMMEND GETTING VACCINATED  IS SCHEDULED FOR A [employer] VACCINE SOON    SUPPORT GIVEN       Past Medical History:   Diagnosis Date    Atopic dermatitis     Resolved: 22Feb2016    Bunion of great toe of right foot     Last Assessed: [id number]    Chronic sinusitis     Resolved: 22Feb2016    Herpes labialis     Resolved: 22Feb2016    Kidney stone  Ovarian cyst     Peptic ulcer        Past Surgical History:   Procedure Laterality Date     SECTION  2015     SECTION  10/20/2017    SINUS SURGERY      WISDOM TOOTH EXTRACTION         Current Outpatient Medications   Medication Sig Dispense Refill    ALPRAZolam (XANAX) 0 25 mg tablet TAKE 1 TABLET(0 25 MG) BY MOUTH TWICE DAILY AS NEEDED FOR ANXIETY 30 tablet 0    Alum & Mag Hydroxide-Simeth (MYLANTA PO) Take 10 mL by mouth as needed (couple times per week)       aspirin-acetaminophen-caffeine (Excedrin Migraine) 250-250-65 MG per tablet Take 1 tablet by mouth as needed for headaches      famotidine (PEPCID) 40 MG tablet TAKE 1 TABLET(40 MG) BY MOUTH DAILY 30 tablet 0    fluticasone (FLONASE) 50 mcg/act nasal spray 2 sprays into each nostril as needed   0    imipramine (TOFRANIL) 10 mg tablet TAKE 1 TABLET(10 MG) BY MOUTH DAILY AT BEDTIME 30 tablet 1    omeprazole (PriLOSEC) 40 MG capsule Take 1 capsule (40 mg total) by mouth daily 90 capsule 1    oxyCODONE-acetaminophen (PERCOCET) 5-325 mg per tablet Take 1 tablet by mouth every 6 (six) hours as needed for moderate painMax Daily Amount: 4 tablets 30 tablet 0     No current facility-administered medications for this visit  Allergies   Allergen Reactions    Elavil [Amitriptyline] Drowsiness    Prednisone GI Intolerance    Tramadol Itching    Vicodin [Hydrocodone-Acetaminophen] Itching    Latex Hives       Review of Systems   Constitutional: Negative for fever  HENT: Negative for congestion and sore throat  Eyes: Negative for discharge  Respiratory: Negative for chest tightness  Cardiovascular: Negative for chest pain and palpitations  Gastrointestinal: Negative for abdominal pain, diarrhea, nausea and vomiting  Musculoskeletal: Negative for arthralgias and joint swelling  Neurological: Negative for numbness  Psychiatric/Behavioral: The patient is nervous/anxious          Video Exam    Vitals: 04/14/21 0836   Weight: 76 7 kg (169 lb)   Height: 5' 4" (1 626 m)       Physical Exam     PATIENT VISUALLY APPEARS WELL IN NO OBVIOUS DISTRESS      I spent 20 minutes directly with the patient during this visit      VIRTUAL VISIT DISCLAIMER    Kay Ozuna acknowledges that she has consented to an online visit or consultation  She understands that the online visit is based solely on information provided by her, and that, in the absence of a face-to-face physical evaluation by the physician, the diagnosis she receives is both limited and provisional in terms of accuracy and completeness  This is not intended to replace a full medical face-to-face evaluation by the physician  Kay Ozuna understands and accepts these terms

## 2021-04-15 ENCOUNTER — IMMUNIZATIONS (OUTPATIENT)
Dept: FAMILY MEDICINE CLINIC | Facility: HOSPITAL | Age: 31
End: 2021-04-15

## 2021-04-15 DIAGNOSIS — Z23 ENCOUNTER FOR IMMUNIZATION: Primary | ICD-10-CM

## 2021-04-15 PROCEDURE — 91300 SARS-COV-2 / COVID-19 MRNA VACCINE (PFIZER-BIONTECH) 30 MCG: CPT

## 2021-04-15 PROCEDURE — 0001A SARS-COV-2 / COVID-19 MRNA VACCINE (PFIZER-BIONTECH) 30 MCG: CPT

## 2021-04-19 ENCOUNTER — TELEPHONE (OUTPATIENT)
Dept: NEUROLOGY | Facility: CLINIC | Age: 31
End: 2021-04-19

## 2021-04-19 DIAGNOSIS — R10.13 DYSPEPSIA: ICD-10-CM

## 2021-04-19 RX ORDER — FAMOTIDINE 40 MG/1
TABLET, FILM COATED ORAL
Qty: 30 TABLET | Refills: 0 | Status: SHIPPED | OUTPATIENT
Start: 2021-04-19 | End: 2021-06-03 | Stop reason: SDUPTHER

## 2021-04-19 NOTE — TELEPHONE ENCOUNTER
THE [facility] for patient to [facility] and confirm appointment with Dr Kamini Daley  Gave direct numbers in [place]

## 2021-05-06 ENCOUNTER — IMMUNIZATIONS (OUTPATIENT)
Dept: FAMILY MEDICINE CLINIC | Facility: HOSPITAL | Age: 31
End: 2021-05-06

## 2021-05-06 DIAGNOSIS — Z23 ENCOUNTER FOR IMMUNIZATION: Primary | ICD-10-CM

## 2021-05-06 PROCEDURE — 91300 SARS-COV-2 / COVID-19 MRNA VACCINE (PFIZER-BIONTECH) 30 MCG: CPT

## 2021-05-06 PROCEDURE — 0002A SARS-COV-2 / COVID-19 MRNA VACCINE (PFIZER-BIONTECH) 30 MCG: CPT

## 2021-05-16 DIAGNOSIS — N94.6 DYSMENORRHEA: ICD-10-CM

## 2021-05-17 RX ORDER — OXYCODONE HYDROCHLORIDE AND ACETAMINOPHEN 5; 325 MG/1; MG/1
1 TABLET ORAL EVERY 6 HOURS PRN
Qty: 30 TABLET | Refills: 0 | Status: SHIPPED | OUTPATIENT
Start: 2021-05-17 | End: 2021-08-19 | Stop reason: SDUPTHER

## 2021-06-03 DIAGNOSIS — R10.13 DYSPEPSIA: ICD-10-CM

## 2021-06-03 RX ORDER — FAMOTIDINE 40 MG/1
TABLET, FILM COATED ORAL
Qty: 30 TABLET | Refills: 0 | Status: SHIPPED | OUTPATIENT
Start: 2021-06-03 | End: 2021-09-16

## 2021-06-15 DIAGNOSIS — R51.9 CHRONIC NONINTRACTABLE HEADACHE, UNSPECIFIED HEADACHE TYPE: ICD-10-CM

## 2021-06-15 DIAGNOSIS — G43.909 MIGRAINE WITHOUT STATUS MIGRAINOSUS, NOT INTRACTABLE, UNSPECIFIED MIGRAINE TYPE: ICD-10-CM

## 2021-06-15 DIAGNOSIS — G89.29 CHRONIC NONINTRACTABLE HEADACHE, UNSPECIFIED HEADACHE TYPE: ICD-10-CM

## 2021-06-15 RX ORDER — IMIPRAMINE HYDROCHLORIDE 10 MG/1
TABLET, FILM COATED ORAL
Qty: 30 TABLET | Refills: 1 | Status: SHIPPED | OUTPATIENT
Start: 2021-06-15 | End: 2021-08-02 | Stop reason: SDUPTHER

## 2021-07-20 ENCOUNTER — TELEPHONE (OUTPATIENT)
Dept: FAMILY MEDICINE CLINIC | Facility: CLINIC | Age: 31
End: 2021-07-20

## 2021-07-20 NOTE — TELEPHONE ENCOUNTER
Jesus Khan is scheduled for her cpx on Aug 6 with Dr Constantine Rosas  We missed mailing her bw order  Please place bw order for Pasquale Pollard will call back with the fax # for the [facility] she is going to on thur

## 2021-07-21 NOTE — TELEPHONE ENCOUNTER
[facility] states Lyrex Christiano had bw done in March  No need to have more done at this time  Informed Paula's response  No further action required

## 2021-08-05 RX ORDER — FLUCONAZOLE 150 MG/1
TABLET ORAL
COMMUNITY
Start: 2021-07-12 | End: 2021-08-06

## 2021-08-06 ENCOUNTER — OFFICE VISIT (OUTPATIENT)
Dept: FAMILY MEDICINE CLINIC | Facility: CLINIC | Age: 31
End: 2021-08-06
Payer: COMMERCIAL

## 2021-08-06 VITALS
SYSTOLIC BLOOD PRESSURE: 120 MMHG | BODY MASS INDEX: 28.85 KG/M2 | HEART RATE: 92 BPM | WEIGHT: 169 LBS | OXYGEN SATURATION: 99 % | RESPIRATION RATE: 16 BRPM | TEMPERATURE: 98.2 F | DIASTOLIC BLOOD PRESSURE: 72 MMHG | HEIGHT: 64 IN

## 2021-08-06 DIAGNOSIS — I73.00 RAYNAUD'S DISEASE WITHOUT GANGRENE: ICD-10-CM

## 2021-08-06 DIAGNOSIS — F41.9 ANXIETY: ICD-10-CM

## 2021-08-06 DIAGNOSIS — Z00.00 ROUTINE GENERAL MEDICAL EXAMINATION AT A HEALTH CARE FACILITY: Primary | ICD-10-CM

## 2021-08-06 DIAGNOSIS — G43.909 MIGRAINE WITHOUT STATUS MIGRAINOSUS, NOT INTRACTABLE, UNSPECIFIED MIGRAINE TYPE: ICD-10-CM

## 2021-08-06 DIAGNOSIS — R51.9 CHRONIC NONINTRACTABLE HEADACHE, UNSPECIFIED HEADACHE TYPE: ICD-10-CM

## 2021-08-06 DIAGNOSIS — R10.13 DYSPEPSIA: ICD-10-CM

## 2021-08-06 DIAGNOSIS — Z13.220 SCREENING FOR HYPERLIPIDEMIA: ICD-10-CM

## 2021-08-06 DIAGNOSIS — G89.29 CHRONIC NONINTRACTABLE HEADACHE, UNSPECIFIED HEADACHE TYPE: ICD-10-CM

## 2021-08-06 DIAGNOSIS — Z13.6 SCREENING FOR HYPERTENSION: ICD-10-CM

## 2021-08-06 DIAGNOSIS — Z13.29 SCREENING FOR HYPOTHYROIDISM: ICD-10-CM

## 2021-08-06 DIAGNOSIS — J30.1 SEASONAL ALLERGIC RHINITIS DUE TO POLLEN: ICD-10-CM

## 2021-08-06 PROBLEM — M54.50 LOW BACK PAIN: Status: RESOLVED | Noted: 2021-02-28 | Resolved: 2021-08-06

## 2021-08-06 PROBLEM — M79.10 MYALGIA: Status: RESOLVED | Noted: 2021-02-28 | Resolved: 2021-08-06

## 2021-08-06 PROBLEM — R39.9 UTI SYMPTOMS: Status: RESOLVED | Noted: 2021-02-28 | Resolved: 2021-08-06

## 2021-08-06 PROCEDURE — 36415 COLL VENOUS BLD VENIPUNCTURE: CPT | Performed by: FAMILY MEDICINE

## 2021-08-06 PROCEDURE — 3008F BODY MASS INDEX DOCD: CPT | Performed by: FAMILY MEDICINE

## 2021-08-06 PROCEDURE — 1036F TOBACCO NON-USER: CPT | Performed by: FAMILY MEDICINE

## 2021-08-06 PROCEDURE — 99395 PREV VISIT EST AGE 18-39: CPT | Performed by: FAMILY MEDICINE

## 2021-08-06 PROCEDURE — 93000 ELECTROCARDIOGRAM COMPLETE: CPT | Performed by: FAMILY MEDICINE

## 2021-08-06 PROCEDURE — 3725F SCREEN DEPRESSION PERFORMED: CPT | Performed by: FAMILY MEDICINE

## 2021-08-06 RX ORDER — TRETINOIN 0.1 MG/G
GEL TOPICAL
COMMUNITY
Start: 2021-05-01

## 2021-08-06 NOTE — PATIENT INSTRUCTIONS
DISCUSSED HEALTH ISSUES  HEALTHY DIET AND EXERCISE  BW WILL BE OBTAINED     RECOMMEND CALCIUM 8769-2214 MG DAILY  VITAMIN D3  1000 IU DAILY  RV IN 1 YEAR FOR ANNUAL EXAM, SOONER IF NEEDED    RV 6M MED CHECK

## 2021-08-06 NOTE — PROGRESS NOTES
FAMILY PRACTICE HEALTH MAINTENANCE OFFICE VISIT  [facility]    NAME: Xavier Siddiqi  AGE: 31 y o  SEX: female  : 1990     DATE: 2021    Assessment and Plan     Problem List Items Addressed This Visit        Respiratory    Allergic rhinitis       Cardiovascular and Mediastinum    Raynauds syndrome    Relevant Orders    CBC and differential    Comprehensive metabolic panel    C-reactive protein    Sedimentation rate, automated    Migraine without status migrainosus, not intractable       Other    Anxiety    Relevant Orders    TSH, 3rd generation    CBC and differential    Chronic nonintractable headache    Dyspepsia    Screening for hypothyroidism    Relevant Orders    TSH, 3rd generation    Screening for hyperlipidemia    Relevant Orders    Lipid panel    Screening for hypertension    Relevant Orders    Comprehensive metabolic panel    POCT ECG (Completed)    Routine general medical examination at a health care facility - Primary               Return in about 6 months (around 2022) for Recheck  Chief Complaint     Chief Complaint   Patient presents with    Physical Exam       History of Present Illness     [address] [place] [address] Rd RECORD  NO CONCERNS AT THIS TIME        Well Adult Physical   Patient here for a comprehensive physical exam       Diet and Physical Activity  Diet: well balanced diet  Weight concerns: Patient is overweight (BMI 25 0-29  9)  Exercise: infrequently      Depression Screen  PHQ-9 Depression Screening    PHQ-9:   Frequency of the following problems over the past two weeks:      Little interest or pleasure in doing things: 0 - not at all  Feeling down, depressed, or hopeless: 0 - not at all  PHQ-2 Score: 0          General Health  Hearing: Normal:  bilateral  Vision: no vision problems  Dental: regular dental visits    Reproductive Health          The following portions of the patient's history were reviewed and updated as appropriate: allergies, current medications, past family history, past medical history, past social history, past surgical history and problem list     Review of Systems     Review of Systems   Constitutional: Negative for chills, fatigue and fever  HENT: Negative for congestion, ear discharge, ear pain, mouth sores, postnasal drip, sore throat and trouble swallowing  Eyes: Negative for pain, discharge and visual disturbance  Respiratory: Negative for cough, shortness of breath and wheezing  Cardiovascular: Negative for chest pain, palpitations and leg swelling  Gastrointestinal: Negative for abdominal distention, abdominal pain, blood in stool, diarrhea and nausea  Endocrine: Negative for polydipsia, polyphagia and polyuria  Genitourinary: Negative for dysuria, frequency, hematuria and urgency  Musculoskeletal: Negative for arthralgias, gait problem and joint swelling  Skin: Negative for pallor and rash  Neurological: Negative for dizziness, syncope, speech difficulty, weakness, light-headedness, numbness and headaches  Hematological: Negative for adenopathy  Psychiatric/Behavioral: Negative for behavioral problems, confusion and sleep disturbance  The patient is not nervous/anxious          Past Medical History     Past Medical History:   Diagnosis Date    Atopic dermatitis     Resolved: [id number]    Bunion of great toe of right foot     Last Assessed: [id number]    Chronic sinusitis     Resolved: [id number]    Herpes labialis     Resolved: [id number]    Kidney stone     Ovarian cyst     Peptic ulcer        Past Surgical History     Past Surgical History:   Procedure Laterality Date     SECTION  2015     SECTION  10/20/2017    SINUS SURGERY      WISDOM TOOTH EXTRACTION         Social History     Social History     Socioeconomic History    Marital status: /Civil Union     Spouse name: None    Number of children: None    Years of education: None    Highest education level: None   Occupational History    None   Tobacco Use    Smoking status: Never Smoker    Smokeless tobacco: Never Used   Vaping Use    Vaping Use: Never used   Substance and Sexual Activity    Alcohol use: Yes     Comment: occasional    Drug use: No    Sexual activity: Yes     Birth control/protection: None   Other Topics Concern    None   Social History Narrative    Caffeine use    Cultural backgroun: Non-    Dental care, regularly    Patient ingests cola containing caffeine    Primary spoken language English    Tea     Social Determinants of Health     Financial Resource Strain:     Difficulty of Paying Living Expenses:    Food Insecurity:     Worried About Running Out of Food in the Last Year:     Ran Out of Food in the Last Year:    Transportation Needs:     Lack of Transportation (Medical):      Lack of Transportation (Non-Medical):    Physical Activity:     Days of Exercise per Week:     Minutes of Exercise per Session:    Stress:     Feeling of Stress :    Social Connections:     Frequency of Communication with Friends and Family:     Frequency of Social Gatherings with Friends and Family:     Attends Jewish Services:     Active Member of Clubs or Organizations:     Attends Club or Organization Meetings:     Marital Status:    Intimate Partner Violence:     Fear of Current or Ex-Partner:     Emotionally Abused:     Physically Abused:     Sexually Abused:        Family History     Family History   Problem Relation Age of Onset    Hyperlipidemia Father     Hypertension Father     Asthma Mother     Breast cancer Paternal Grandmother     Breast cancer Paternal Aunt     Substance Abuse Neg Hx     Mental illness Neg Hx        Current Medications       Current Outpatient Medications:     ALPRAZolam (XANAX) 0 25 mg tablet, TAKE 1 TABLET(0 25 MG) BY MOUTH TWICE DAILY AS NEEDED FOR ANXIETY, Disp: 30 tablet, Rfl: 0    Alum & Mag Hydroxide-Simeth (MYLANTA PO), Take 10 mL by mouth as needed (couple times per week) , Disp: , Rfl:     aspirin-acetaminophen-caffeine (Excedrin Migraine) 250-250-65 MG per tablet, Take 1 tablet by mouth as needed for headaches, Disp: , Rfl:     famotidine (PEPCID) 40 MG tablet, TAKE 1 TABLET(40 MG) BY MOUTH DAILY, Disp: 30 tablet, Rfl: 0    fluticasone (FLONASE) 50 mcg/act nasal spray, 2 sprays into each nostril as needed , Disp: , Rfl: 0    imipramine (TOFRANIL) 25 mg tablet, Take 1 tablet (25 mg total) by mouth daily at bedtime, Disp: 90 tablet, Rfl: 1    oxyCODONE-acetaminophen (PERCOCET) 5-325 mg per tablet, Take 1 tablet by mouth every 6 (six) hours as needed for moderate painMax Daily Amount: 4 tablets, Disp: 30 tablet, Rfl: 0    tretinoin (RETIN-A) 0 01 % gel, Apply topically , Disp: , Rfl:      Allergies     Allergies   Allergen Reactions    Elavil [Amitriptyline] Drowsiness    Prednisone GI Intolerance    Tramadol Itching    Vicodin [Hydrocodone-Acetaminophen] Itching    Latex Hives       Objective     /72   Pulse 92   Temp 98 2 °F (36 8 °C) (Temporal)   Resp 16   Ht 5' 4" (1 626 m)   Wt 76 7 kg (169 lb)   SpO2 99%   BMI 29 01 kg/m²      Physical Exam  Vitals reviewed  Constitutional:       Appearance: She is well-developed  HENT:      Head: Normocephalic and atraumatic  Right Ear: External ear normal       Left Ear: External ear normal       Nose: Nose normal    Eyes:      General:         Right eye: No discharge  Left eye: No discharge  Conjunctiva/sclera: Conjunctivae normal       Pupils: Pupils are equal, round, and reactive to light  Neck:      Thyroid: No thyromegaly  Cardiovascular:      Rate and Rhythm: Normal rate and regular rhythm  Heart sounds: Normal heart sounds  No murmur heard  Pulmonary:      Effort: Pulmonary effort is normal       Breath sounds: Normal breath sounds  No wheezing or rales     Abdominal:      General: Bowel sounds are normal  There is no distension  Palpations: Abdomen is soft  There is no mass  Tenderness: There is no abdominal tenderness  There is no guarding or rebound  Musculoskeletal:         General: No tenderness or deformity  Normal range of motion  Cervical back: Normal range of motion and neck supple  Lymphadenopathy:      Cervical: No cervical adenopathy  Skin:     General: Skin is warm and dry  Findings: No erythema or rash  Neurological:      General: No focal deficit present  Mental Status: She is alert and oriented to person, place, and time  Cranial Nerves: No cranial nerve deficit  Sensory: No sensory deficit  Motor: No weakness or abnormal muscle tone  Coordination: Coordination normal       Gait: Gait normal       Deep Tendon Reflexes: Reflexes are normal and symmetric  Reflexes normal    Psychiatric:         Mood and Affect: Mood normal          Behavior: Behavior normal          Thought Content:  Thought content normal          Judgment: Judgment normal            No exam data present    Health Maintenance     Health Maintenance   Topic Date Due    Hepatitis C Screening  Never done    Cervical Cancer Screening  04/05/2020    Influenza Vaccine (1) 09/01/2021    BMI: Followup Plan  03/01/2022    BMI: Adult  04/14/2022    Depression Screening PHQ  08/06/2022    Annual Physical  08/06/2022    DTaP,Tdap,and Td Vaccines (7 - Td or Tdap) 07/28/2026    HIB Vaccine  Completed    IPV Vaccine  Completed    COVID-19 Vaccine  Completed    HIV Screening  Addressed    Pneumococcal Vaccine: Pediatrics (0 to 5 Years) and At-Risk Patients (6 to 59 Years)  Aged Out    Hepatitis B Vaccine  Aged Out    Hepatitis A Vaccine  Aged Out    Meningococcal ACWY Vaccine  Aged Out    HPV Vaccine  Aged [employer] History   Administered Date(s) Administered    DTP 02/08/1993, 04/07/1993, 06/28/1993, 06/14/1994, 08/25/1998    Hib (PRP-OMP) 02/08/1993, 04/07/1993, 12/10/1993    INFLUENZA 10/22/2018    IPV 02/08/1993, 04/07/1993, 06/14/1994, 08/25/1998    Influenza, injectable, quadrivalent, preservative free 0 5 mL 10/22/2018, 11/14/2019, 10/14/2020    MMR 03/17/1994, 08/25/1998    Meningococcal, Unknown Serogroups 05/04/2010    SARS-CoV-2 / COVID-19 mRNA IM (Pfizer-BioNTZapa) 04/15/2021, 05/06/2021    Td (adult), adsorbed 06/21/2007    Tdap 07/28/2016    Tuberculin Skin Test 09/09/1993    Tuberculin Skin Test-PPD Intradermal 12/04/2014       Parvin Soriano MD  [facility]

## 2021-08-07 LAB
ALBUMIN SERPL-MCNC: 4.6 G/DL (ref 3.9–5)
ALBUMIN/GLOB SERPL: 1.6 {RATIO} (ref 1.2–2.2)
ALP SERPL-CCNC: 76 IU/L (ref 48–121)
ALT SERPL-CCNC: 13 IU/L (ref 0–32)
AST SERPL-CCNC: 20 IU/L (ref 0–40)
BASOPHILS # BLD AUTO: 0 X10E3/UL (ref 0–0.2)
BASOPHILS NFR BLD AUTO: 1 %
BILIRUB SERPL-MCNC: 0.3 MG/DL (ref 0–1.2)
BUN SERPL-MCNC: 14 MG/DL (ref 6–20)
BUN/CREAT SERPL: 21 (ref 9–23)
CALCIUM SERPL-MCNC: 9.6 MG/DL (ref 8.7–10.2)
CHLORIDE SERPL-SCNC: 103 MMOL/L (ref 96–106)
CHOLEST SERPL-MCNC: 183 MG/DL (ref 100–199)
CHOLEST/HDLC SERPL: 4.4 RATIO (ref 0–4.4)
CO2 SERPL-SCNC: 21 MMOL/L (ref 20–29)
CREAT SERPL-MCNC: 0.67 MG/DL (ref 0.57–1)
CRP SERPL-MCNC: 2 MG/L (ref 0–10)
EOSINOPHIL # BLD AUTO: 0.1 X10E3/UL (ref 0–0.4)
EOSINOPHIL NFR BLD AUTO: 2 %
ERYTHROCYTE [DISTWIDTH] IN BLOOD BY AUTOMATED COUNT: 12.7 % (ref 11.7–15.4)
ERYTHROCYTE [SEDIMENTATION RATE] IN BLOOD BY WESTERGREN METHOD: 21 MM/HR (ref 0–32)
GLOBULIN SER-MCNC: 2.8 G/DL (ref 1.5–4.5)
GLUCOSE SERPL-MCNC: 88 MG/DL (ref 65–99)
HCT VFR BLD AUTO: 40.4 % (ref 34–46.6)
HDLC SERPL-MCNC: 42 MG/DL
HGB BLD-MCNC: 13 G/DL (ref 11.1–15.9)
IMM GRANULOCYTES # BLD: 0 X10E3/UL (ref 0–0.1)
IMM GRANULOCYTES NFR BLD: 0 %
LDLC SERPL CALC-MCNC: 114 MG/DL (ref 0–99)
LYMPHOCYTES # BLD AUTO: 1.6 X10E3/UL (ref 0.7–3.1)
LYMPHOCYTES NFR BLD AUTO: 28 %
MCH RBC QN AUTO: 26.4 PG (ref 26.6–33)
MCHC RBC AUTO-ENTMCNC: 32.2 G/DL (ref 31.5–35.7)
MCV RBC AUTO: 82 FL (ref 79–97)
MONOCYTES # BLD AUTO: 0.3 X10E3/UL (ref 0.1–0.9)
MONOCYTES NFR BLD AUTO: 5 %
NEUTROPHILS # BLD AUTO: 3.7 X10E3/UL (ref 1.4–7)
NEUTROPHILS NFR BLD AUTO: 64 %
PLATELET # BLD AUTO: 329 X10E3/UL (ref 150–450)
POTASSIUM SERPL-SCNC: 4.3 MMOL/L (ref 3.5–5.2)
PROT SERPL-MCNC: 7.4 G/DL (ref 6–8.5)
RBC # BLD AUTO: 4.92 X10E6/UL (ref 3.77–5.28)
SL AMB EGFR AFRICAN AMERICAN: 138 ML/MIN/1.73
SL AMB EGFR NON AFRICAN AMERICAN: 120 ML/MIN/1.73
SL AMB VLDL CHOLESTEROL CALC: 27 MG/DL (ref 5–40)
SODIUM SERPL-SCNC: 138 MMOL/L (ref 134–144)
TRIGL SERPL-MCNC: 151 MG/DL (ref 0–149)
TSH SERPL DL<=0.005 MIU/L-ACNC: 1.61 UIU/ML (ref 0.45–4.5)
WBC # BLD AUTO: 5.8 X10E3/UL (ref 3.4–10.8)

## 2021-08-19 DIAGNOSIS — N94.6 DYSMENORRHEA: ICD-10-CM

## 2021-08-19 RX ORDER — OXYCODONE HYDROCHLORIDE AND ACETAMINOPHEN 5; 325 MG/1; MG/1
1 TABLET ORAL EVERY 6 HOURS PRN
Qty: 30 TABLET | Refills: 0 | Status: SHIPPED | OUTPATIENT
Start: 2021-08-19 | End: 2021-11-19 | Stop reason: SDUPTHER

## 2021-08-26 NOTE — TELEPHONE ENCOUNTER
Jacinto Tsang called and said that her obgyn prescribed naltrixeom? For her post partum depression  She wanted to know if it is ok to take that with the metaxalone that you wrote for her   Please advise thank you
PLEASE CALL BACK    I AM NOT FAMILIAR WITH THAT MEDICATION  SHE SHOULD CALL THE PHARMACIST  THEY SHOULD BE ABLE TO PROVIDE THAT INFO
PT INFORMED   SF
Attending with

## 2021-09-15 DIAGNOSIS — R10.13 DYSPEPSIA: ICD-10-CM

## 2021-09-16 RX ORDER — FAMOTIDINE 40 MG/1
TABLET, FILM COATED ORAL
Qty: 30 TABLET | Refills: 0 | Status: SHIPPED | OUTPATIENT
Start: 2021-09-16 | End: 2021-10-26

## 2021-10-25 DIAGNOSIS — R10.13 DYSPEPSIA: ICD-10-CM

## 2021-10-26 RX ORDER — FAMOTIDINE 40 MG/1
TABLET, FILM COATED ORAL
Qty: 30 TABLET | Refills: 0 | Status: SHIPPED | OUTPATIENT
Start: 2021-10-26 | End: 2021-12-08

## 2021-11-10 ENCOUNTER — TELEMEDICINE (OUTPATIENT)
Dept: FAMILY MEDICINE CLINIC | Facility: CLINIC | Age: 31
End: 2021-11-10
Payer: COMMERCIAL

## 2021-11-10 DIAGNOSIS — M43.6 NECK STIFFNESS: ICD-10-CM

## 2021-11-10 DIAGNOSIS — Z11.59 ENCOUNTER FOR HEPATITIS C SCREENING TEST FOR LOW RISK PATIENT: ICD-10-CM

## 2021-11-10 DIAGNOSIS — R53.83 FATIGUE, UNSPECIFIED TYPE: ICD-10-CM

## 2021-11-10 DIAGNOSIS — M25.50 ARTHRALGIA, UNSPECIFIED JOINT: Primary | ICD-10-CM

## 2021-11-10 PROCEDURE — 99213 OFFICE O/P EST LOW 20 MIN: CPT | Performed by: FAMILY MEDICINE

## 2021-11-10 RX ORDER — NAPROXEN 375 MG/1
375 TABLET ORAL
Qty: 42 TABLET | Refills: 1 | Status: SHIPPED | OUTPATIENT
Start: 2021-11-10 | End: 2021-12-16 | Stop reason: ALTCHOICE

## 2021-11-12 ENCOUNTER — TELEMEDICINE (OUTPATIENT)
Dept: FAMILY MEDICINE CLINIC | Facility: CLINIC | Age: 31
End: 2021-11-12
Payer: COMMERCIAL

## 2021-11-12 DIAGNOSIS — M25.50 ARTHRALGIA, UNSPECIFIED JOINT: Primary | ICD-10-CM

## 2021-11-12 DIAGNOSIS — G43.909 MIGRAINE WITHOUT STATUS MIGRAINOSUS, NOT INTRACTABLE, UNSPECIFIED MIGRAINE TYPE: ICD-10-CM

## 2021-11-12 DIAGNOSIS — R53.83 FATIGUE, UNSPECIFIED TYPE: ICD-10-CM

## 2021-11-12 LAB
ALBUMIN SERPL-MCNC: 4.3 G/DL (ref 3.9–5)
ALBUMIN/GLOB SERPL: 1.6 {RATIO} (ref 1.2–2.2)
ALP SERPL-CCNC: 89 IU/L (ref 44–121)
ALT SERPL-CCNC: 12 IU/L (ref 0–32)
AST SERPL-CCNC: 17 IU/L (ref 0–40)
B BURGDOR IGG+IGM SER-ACNC: <0.91 ISR (ref 0–0.9)
BASOPHILS # BLD AUTO: 0 X10E3/UL (ref 0–0.2)
BASOPHILS NFR BLD AUTO: 1 %
BILIRUB SERPL-MCNC: <0.2 MG/DL (ref 0–1.2)
BUN SERPL-MCNC: 11 MG/DL (ref 6–20)
BUN/CREAT SERPL: 16 (ref 9–23)
CALCIUM SERPL-MCNC: 9.7 MG/DL (ref 8.7–10.2)
CHLORIDE SERPL-SCNC: 103 MMOL/L (ref 96–106)
CO2 SERPL-SCNC: 25 MMOL/L (ref 20–29)
CREAT SERPL-MCNC: 0.67 MG/DL (ref 0.57–1)
CRP SERPL-MCNC: 5 MG/L (ref 0–10)
EOSINOPHIL # BLD AUTO: 0.1 X10E3/UL (ref 0–0.4)
EOSINOPHIL NFR BLD AUTO: 1 %
ERYTHROCYTE [DISTWIDTH] IN BLOOD BY AUTOMATED COUNT: 12.8 % (ref 11.7–15.4)
ERYTHROCYTE [SEDIMENTATION RATE] IN BLOOD BY WESTERGREN METHOD: 19 MM/HR (ref 0–32)
GLOBULIN SER-MCNC: 2.7 G/DL (ref 1.5–4.5)
GLUCOSE SERPL-MCNC: 81 MG/DL (ref 65–99)
HCT VFR BLD AUTO: 39.1 % (ref 34–46.6)
HGB BLD-MCNC: 13 G/DL (ref 11.1–15.9)
IMM GRANULOCYTES # BLD: 0 X10E3/UL (ref 0–0.1)
IMM GRANULOCYTES NFR BLD: 0 %
LYMPHOCYTES # BLD AUTO: 1.7 X10E3/UL (ref 0.7–3.1)
LYMPHOCYTES NFR BLD AUTO: 25 %
MCH RBC QN AUTO: 27.1 PG (ref 26.6–33)
MCHC RBC AUTO-ENTMCNC: 33.2 G/DL (ref 31.5–35.7)
MCV RBC AUTO: 82 FL (ref 79–97)
MONOCYTES # BLD AUTO: 0.4 X10E3/UL (ref 0.1–0.9)
MONOCYTES NFR BLD AUTO: 5 %
NEUTROPHILS # BLD AUTO: 4.5 X10E3/UL (ref 1.4–7)
NEUTROPHILS NFR BLD AUTO: 68 %
PLATELET # BLD AUTO: 299 X10E3/UL (ref 150–450)
POTASSIUM SERPL-SCNC: 4.2 MMOL/L (ref 3.5–5.2)
PROT SERPL-MCNC: 7 G/DL (ref 6–8.5)
RBC # BLD AUTO: 4.79 X10E6/UL (ref 3.77–5.28)
SL AMB EGFR AFRICAN AMERICAN: 138 ML/MIN/1.73
SL AMB EGFR NON AFRICAN AMERICAN: 120 ML/MIN/1.73
SODIUM SERPL-SCNC: 141 MMOL/L (ref 134–144)
TSH SERPL DL<=0.005 MIU/L-ACNC: 1.31 UIU/ML (ref 0.45–4.5)
WBC # BLD AUTO: 6.7 X10E3/UL (ref 3.4–10.8)

## 2021-11-12 PROCEDURE — 1036F TOBACCO NON-USER: CPT | Performed by: FAMILY MEDICINE

## 2021-11-12 PROCEDURE — 99214 OFFICE O/P EST MOD 30 MIN: CPT | Performed by: FAMILY MEDICINE

## 2021-11-19 DIAGNOSIS — N94.6 DYSMENORRHEA: ICD-10-CM

## 2021-11-21 RX ORDER — OXYCODONE HYDROCHLORIDE AND ACETAMINOPHEN 5; 325 MG/1; MG/1
1 TABLET ORAL EVERY 6 HOURS PRN
Qty: 30 TABLET | Refills: 0 | Status: SHIPPED | OUTPATIENT
Start: 2021-11-21 | End: 2022-02-21 | Stop reason: SDUPTHER

## 2021-12-02 ENCOUNTER — TELEMEDICINE (OUTPATIENT)
Dept: FAMILY MEDICINE CLINIC | Facility: CLINIC | Age: 31
End: 2021-12-02
Payer: COMMERCIAL

## 2021-12-02 VITALS — TEMPERATURE: 97.8 F

## 2021-12-02 DIAGNOSIS — J01.40 ACUTE NON-RECURRENT PANSINUSITIS: Primary | ICD-10-CM

## 2021-12-02 PROCEDURE — 99213 OFFICE O/P EST LOW 20 MIN: CPT | Performed by: FAMILY MEDICINE

## 2021-12-02 RX ORDER — CEFUROXIME AXETIL 250 MG/1
250 TABLET ORAL EVERY 12 HOURS SCHEDULED
Qty: 20 TABLET | Refills: 0 | Status: SHIPPED | OUTPATIENT
Start: 2021-12-02 | End: 2021-12-12

## 2021-12-07 DIAGNOSIS — R10.13 DYSPEPSIA: ICD-10-CM

## 2021-12-08 RX ORDER — FAMOTIDINE 40 MG/1
TABLET, FILM COATED ORAL
Qty: 30 TABLET | Refills: 0 | Status: SHIPPED | OUTPATIENT
Start: 2021-12-08 | End: 2022-01-25

## 2021-12-16 ENCOUNTER — OFFICE VISIT (OUTPATIENT)
Dept: FAMILY MEDICINE CLINIC | Facility: CLINIC | Age: 31
End: 2021-12-16
Payer: COMMERCIAL

## 2021-12-16 VITALS
HEART RATE: 112 BPM | RESPIRATION RATE: 12 BRPM | BODY MASS INDEX: 29.37 KG/M2 | WEIGHT: 172 LBS | DIASTOLIC BLOOD PRESSURE: 72 MMHG | SYSTOLIC BLOOD PRESSURE: 110 MMHG | OXYGEN SATURATION: 98 % | TEMPERATURE: 98.8 F | HEIGHT: 64 IN

## 2021-12-16 DIAGNOSIS — R05.9 COUGH: ICD-10-CM

## 2021-12-16 DIAGNOSIS — R53.83 FATIGUE, UNSPECIFIED TYPE: ICD-10-CM

## 2021-12-16 DIAGNOSIS — R00.0 TACHYCARDIA: ICD-10-CM

## 2021-12-16 DIAGNOSIS — K30 INDIGESTION: Primary | ICD-10-CM

## 2021-12-16 PROBLEM — Z00.00 ROUTINE GENERAL MEDICAL EXAMINATION AT A HEALTH CARE FACILITY: Status: RESOLVED | Noted: 2021-08-06 | Resolved: 2021-12-16

## 2021-12-16 PROBLEM — Z13.220 SCREENING FOR HYPERLIPIDEMIA: Status: RESOLVED | Noted: 2021-08-06 | Resolved: 2021-12-16

## 2021-12-16 PROBLEM — Z13.29 SCREENING FOR HYPOTHYROIDISM: Status: RESOLVED | Noted: 2021-08-06 | Resolved: 2021-12-16

## 2021-12-16 PROBLEM — Z13.6 SCREENING FOR HYPERTENSION: Status: RESOLVED | Noted: 2021-08-06 | Resolved: 2021-12-16

## 2021-12-16 PROCEDURE — 1036F TOBACCO NON-USER: CPT | Performed by: NURSE PRACTITIONER

## 2021-12-16 PROCEDURE — 93000 ELECTROCARDIOGRAM COMPLETE: CPT | Performed by: NURSE PRACTITIONER

## 2021-12-16 PROCEDURE — 3008F BODY MASS INDEX DOCD: CPT | Performed by: NURSE PRACTITIONER

## 2021-12-16 PROCEDURE — 99214 OFFICE O/P EST MOD 30 MIN: CPT | Performed by: NURSE PRACTITIONER

## 2021-12-17 LAB
BASOPHILS # BLD AUTO: 0 X10E3/UL (ref 0–0.2)
BASOPHILS NFR BLD AUTO: 1 %
BUN SERPL-MCNC: 12 MG/DL (ref 6–20)
BUN/CREAT SERPL: 18 (ref 9–23)
CALCIUM SERPL-MCNC: 10 MG/DL (ref 8.7–10.2)
CHLORIDE SERPL-SCNC: 101 MMOL/L (ref 96–106)
CO2 SERPL-SCNC: 21 MMOL/L (ref 20–29)
CREAT SERPL-MCNC: 0.68 MG/DL (ref 0.57–1)
EOSINOPHIL # BLD AUTO: 0.1 X10E3/UL (ref 0–0.4)
EOSINOPHIL NFR BLD AUTO: 2 %
ERYTHROCYTE [DISTWIDTH] IN BLOOD BY AUTOMATED COUNT: 12.5 % (ref 11.7–15.4)
GLUCOSE SERPL-MCNC: 93 MG/DL (ref 65–99)
HCT VFR BLD AUTO: 41.2 % (ref 34–46.6)
HGB BLD-MCNC: 13.7 G/DL (ref 11.1–15.9)
IMM GRANULOCYTES # BLD: 0 X10E3/UL (ref 0–0.1)
IMM GRANULOCYTES NFR BLD: 0 %
LYMPHOCYTES # BLD AUTO: 1.6 X10E3/UL (ref 0.7–3.1)
LYMPHOCYTES NFR BLD AUTO: 24 %
MCH RBC QN AUTO: 26.3 PG (ref 26.6–33)
MCHC RBC AUTO-ENTMCNC: 33.3 G/DL (ref 31.5–35.7)
MCV RBC AUTO: 79 FL (ref 79–97)
MONOCYTES # BLD AUTO: 0.4 X10E3/UL (ref 0.1–0.9)
MONOCYTES NFR BLD AUTO: 6 %
NEUTROPHILS # BLD AUTO: 4.6 X10E3/UL (ref 1.4–7)
NEUTROPHILS NFR BLD AUTO: 67 %
PLATELET # BLD AUTO: 330 X10E3/UL (ref 150–450)
POTASSIUM SERPL-SCNC: 4.5 MMOL/L (ref 3.5–5.2)
RBC # BLD AUTO: 5.2 X10E6/UL (ref 3.77–5.28)
SL AMB EGFR AFRICAN AMERICAN: 137 ML/MIN/1.73
SL AMB EGFR NON AFRICAN AMERICAN: 119 ML/MIN/1.73
SODIUM SERPL-SCNC: 138 MMOL/L (ref 134–144)
WBC # BLD AUTO: 6.9 X10E3/UL (ref 3.4–10.8)

## 2022-01-04 ENCOUNTER — TELEPHONE (OUTPATIENT)
Dept: FAMILY MEDICINE CLINIC | Facility: CLINIC | Age: 32
End: 2022-01-04

## 2022-01-04 PROCEDURE — U0003 INFECTIOUS AGENT DETECTION BY NUCLEIC ACID (DNA OR RNA); SEVERE ACUTE RESPIRATORY SYNDROME CORONAVIRUS 2 (SARS-COV-2) (CORONAVIRUS DISEASE [COVID-19]), AMPLIFIED PROBE TECHNIQUE, MAKING USE OF HIGH THROUGHPUT TECHNOLOGIES AS DESCRIBED BY CMS-2020-01-R: HCPCS | Performed by: FAMILY MEDICINE

## 2022-01-04 PROCEDURE — U0005 INFEC AGEN DETEC AMPLI PROBE: HCPCS | Performed by: FAMILY MEDICINE

## 2022-01-04 NOTE — TELEPHONE ENCOUNTER
Informed Kristyn Coxes her and Floyd's orders are placed  Info given for [facility]    No further action required

## 2022-01-08 ENCOUNTER — IMMUNIZATIONS (OUTPATIENT)
Dept: FAMILY MEDICINE CLINIC | Facility: HOSPITAL | Age: 32
End: 2022-01-08

## 2022-01-08 DIAGNOSIS — Z23 ENCOUNTER FOR IMMUNIZATION: Primary | ICD-10-CM

## 2022-01-08 PROCEDURE — 0001A COVID-19 PFIZER VACC 0.3 ML: CPT

## 2022-01-08 PROCEDURE — 91300 COVID-19 PFIZER VACC 0.3 ML: CPT

## 2022-01-14 ENCOUNTER — OFFICE VISIT (OUTPATIENT)
Dept: FAMILY MEDICINE CLINIC | Facility: CLINIC | Age: 32
End: 2022-01-14
Payer: COMMERCIAL

## 2022-01-14 VITALS
RESPIRATION RATE: 16 BRPM | WEIGHT: 174 LBS | HEART RATE: 108 BPM | DIASTOLIC BLOOD PRESSURE: 74 MMHG | OXYGEN SATURATION: 99 % | BODY MASS INDEX: 29.71 KG/M2 | HEIGHT: 64 IN | TEMPERATURE: 97.4 F | SYSTOLIC BLOOD PRESSURE: 114 MMHG

## 2022-01-14 DIAGNOSIS — N64.3 GALACTORRHEA IN FEMALE: Primary | ICD-10-CM

## 2022-01-14 PROCEDURE — 99213 OFFICE O/P EST LOW 20 MIN: CPT | Performed by: FAMILY MEDICINE

## 2022-01-14 PROCEDURE — 1036F TOBACCO NON-USER: CPT | Performed by: FAMILY MEDICINE

## 2022-01-14 PROCEDURE — 36415 COLL VENOUS BLD VENIPUNCTURE: CPT | Performed by: FAMILY MEDICINE

## 2022-01-14 NOTE — PROGRESS NOTES
BMI Counseling: Body mass index is 29 87 kg/m²  The BMI is above normal  Nutrition recommendations include encouraging healthy choices of fruits and vegetables and moderation in carbohydrate intake  Exercise recommendations include exercising 3-5 times per week  No pharmacotherapy was ordered  Rationale for BMI follow-up plan is due to patient being overweight or obese  Assessment/Plan:    No problem-specific Assessment & Plan notes found for this encounter  Diagnoses and all orders for this visit:    Galactorrhea in female  -     CBC and differential  -     TSH, 3rd generation  -     Comprehensive metabolic panel  -     Prolactin          Patient Instructions   MONITOR SYMPTOMS  BW WILL BE OBTAINED  REASSURANCE      FURTHER PLANS PENDING EVAL      Return if symptoms worsen or fail to improve, for Next scheduled follow up  Subjective:      Patient ID: Roseanna Michelle is a 36 y o  female  Chief Complaint   Patient presents with    started lactating       PATIENT STATES AFTER COVID INJECTION SHE BEGAN LACTATING  SMALL AMTS  NOT BLOODY  NO SWELLING OR LUMPS NOTED       The following portions of the patient's history were reviewed and updated as appropriate: allergies, current medications, past family history, past medical history, past social history, past surgical history and problem list     Review of Systems   Constitutional: Negative for fever  HENT: Negative for congestion and sore throat  Eyes: Negative for discharge  Respiratory: Negative for chest tightness  Cardiovascular: Negative for chest pain and palpitations  Gastrointestinal: Negative for abdominal pain, diarrhea, nausea and vomiting  Musculoskeletal: Negative for arthralgias and joint swelling  Neurological: Negative for numbness  Psychiatric/Behavioral: The patient is not nervous/anxious            Current Outpatient Medications   Medication Sig Dispense Refill    ALPRAZolam (XANAX) 0 25 mg tablet TAKE 1 TABLET(0 25 MG) BY MOUTH TWICE DAILY AS NEEDED FOR ANXIETY 30 tablet 0    aspirin-acetaminophen-caffeine (Excedrin Migraine) 250-250-65 MG per tablet Take 1 tablet by mouth as needed for headaches      famotidine (PEPCID) 40 MG tablet TAKE 1 TABLET(40 MG) BY MOUTH DAILY 30 tablet 0    fluticasone (FLONASE) 50 mcg/act nasal spray 2 sprays into each nostril as needed   0    imipramine (TOFRANIL) 25 mg tablet Take 1 tablet (25 mg total) by mouth daily at bedtime 90 tablet 1    oxyCODONE-acetaminophen (PERCOCET) 5-325 mg per tablet Take 1 tablet by mouth every 6 (six) hours as needed for moderate pain Max Daily Amount: 4 tablets 30 tablet 0    tretinoin (RETIN-A) 0 01 % gel Apply topically        No current facility-administered medications for this visit  Objective:    /74   Pulse (!) 108   Temp (!) 97 4 °F (36 3 °C) (Temporal)   Resp 16   Ht 5' 4" (1 626 m)   Wt 78 9 kg (174 lb)   SpO2 99%   BMI 29 87 kg/m²        Physical Exam  Constitutional:       Appearance: She is well-developed  HENT:      Head: Normocephalic and atraumatic  Eyes:      General:         Right eye: No discharge  Left eye: No discharge  Conjunctiva/sclera: Conjunctivae normal       Pupils: Pupils are equal, round, and reactive to light  Neck:      Thyroid: No thyromegaly  Cardiovascular:      Rate and Rhythm: Normal rate and regular rhythm  Heart sounds: Normal heart sounds  No murmur heard  Pulmonary:      Effort: Pulmonary effort is normal  No respiratory distress  Breath sounds: Normal breath sounds  No wheezing or rales  Abdominal:      General: Bowel sounds are normal       Palpations: Abdomen is soft  Tenderness: There is no abdominal tenderness  Musculoskeletal:         General: No tenderness  Normal range of motion  Cervical back: Normal range of motion and neck supple  Lymphadenopathy:      Cervical: No cervical adenopathy  Skin:     General: Skin is warm and dry  Findings: No erythema or rash  Comments: BREAST  NO ABNORMAL MASSES  NO DISCHARGE NOTED   Neurological:      Mental Status: She is alert and oriented to person, place, and time  Psychiatric:         Behavior: Behavior normal          Thought Content:  Thought content normal          Judgment: Judgment normal                 Jazz Duckworth MD

## 2022-01-15 LAB
ALBUMIN SERPL-MCNC: 4.6 G/DL (ref 3.9–5)
ALBUMIN/GLOB SERPL: 1.3 {RATIO} (ref 1.2–2.2)
ALP SERPL-CCNC: 90 IU/L (ref 44–121)
ALT SERPL-CCNC: 20 IU/L (ref 0–32)
AST SERPL-CCNC: 19 IU/L (ref 0–40)
BASOPHILS # BLD AUTO: 0 X10E3/UL (ref 0–0.2)
BASOPHILS NFR BLD AUTO: 1 %
BILIRUB SERPL-MCNC: <0.2 MG/DL (ref 0–1.2)
BUN SERPL-MCNC: 14 MG/DL (ref 6–20)
BUN/CREAT SERPL: 21 (ref 9–23)
CALCIUM SERPL-MCNC: 9.9 MG/DL (ref 8.7–10.2)
CHLORIDE SERPL-SCNC: 101 MMOL/L (ref 96–106)
CO2 SERPL-SCNC: 24 MMOL/L (ref 20–29)
CREAT SERPL-MCNC: 0.68 MG/DL (ref 0.57–1)
EOSINOPHIL # BLD AUTO: 0.1 X10E3/UL (ref 0–0.4)
EOSINOPHIL NFR BLD AUTO: 2 %
ERYTHROCYTE [DISTWIDTH] IN BLOOD BY AUTOMATED COUNT: 13 % (ref 11.7–15.4)
GLOBULIN SER-MCNC: 3.5 G/DL (ref 1.5–4.5)
GLUCOSE SERPL-MCNC: 76 MG/DL (ref 65–99)
HCT VFR BLD AUTO: 39.5 % (ref 34–46.6)
HGB BLD-MCNC: 12.9 G/DL (ref 11.1–15.9)
IMM GRANULOCYTES # BLD: 0 X10E3/UL (ref 0–0.1)
IMM GRANULOCYTES NFR BLD: 0 %
LYMPHOCYTES # BLD AUTO: 2.3 X10E3/UL (ref 0.7–3.1)
LYMPHOCYTES NFR BLD AUTO: 32 %
MCH RBC QN AUTO: 26 PG (ref 26.6–33)
MCHC RBC AUTO-ENTMCNC: 32.7 G/DL (ref 31.5–35.7)
MCV RBC AUTO: 80 FL (ref 79–97)
MONOCYTES # BLD AUTO: 0.5 X10E3/UL (ref 0.1–0.9)
MONOCYTES NFR BLD AUTO: 7 %
NEUTROPHILS # BLD AUTO: 4.2 X10E3/UL (ref 1.4–7)
NEUTROPHILS NFR BLD AUTO: 58 %
PLATELET # BLD AUTO: 371 X10E3/UL (ref 150–450)
POTASSIUM SERPL-SCNC: 4.2 MMOL/L (ref 3.5–5.2)
PROLACTIN SERPL-MCNC: 8 NG/ML (ref 4.8–23.3)
PROT SERPL-MCNC: 8.1 G/DL (ref 6–8.5)
RBC # BLD AUTO: 4.97 X10E6/UL (ref 3.77–5.28)
SL AMB EGFR AFRICAN AMERICAN: 137 ML/MIN/1.73
SL AMB EGFR NON AFRICAN AMERICAN: 119 ML/MIN/1.73
SODIUM SERPL-SCNC: 139 MMOL/L (ref 134–144)
TSH SERPL DL<=0.005 MIU/L-ACNC: 2.22 UIU/ML (ref 0.45–4.5)
WBC # BLD AUTO: 7.2 X10E3/UL (ref 3.4–10.8)

## 2022-01-24 DIAGNOSIS — R10.13 DYSPEPSIA: ICD-10-CM

## 2022-01-25 ENCOUNTER — TELEPHONE (OUTPATIENT)
Dept: FAMILY MEDICINE CLINIC | Facility: CLINIC | Age: 32
End: 2022-01-25

## 2022-01-25 RX ORDER — FAMOTIDINE 40 MG/1
TABLET, FILM COATED ORAL
Qty: 30 TABLET | Refills: 0 | Status: SHIPPED | OUTPATIENT
Start: 2022-01-25 | End: 2022-03-10

## 2022-01-25 NOTE — TELEPHONE ENCOUNTER
Both Jeaneth's kids have covid  Both her and Stanton Grullon are vaccinated and got boosted 2 wks ago  Is there anything they can take or do to help prevent them getting it?   [phone]

## 2022-01-31 ENCOUNTER — TELEMEDICINE (OUTPATIENT)
Dept: FAMILY MEDICINE CLINIC | Facility: CLINIC | Age: 32
End: 2022-01-31
Payer: COMMERCIAL

## 2022-01-31 VITALS — HEIGHT: 64 IN | WEIGHT: 174 LBS | BODY MASS INDEX: 29.71 KG/M2

## 2022-01-31 DIAGNOSIS — U07.1 COVID-19: Primary | ICD-10-CM

## 2022-01-31 DIAGNOSIS — B00.9 HERPES SIMPLEX: ICD-10-CM

## 2022-01-31 PROCEDURE — 99213 OFFICE O/P EST LOW 20 MIN: CPT | Performed by: FAMILY MEDICINE

## 2022-01-31 PROCEDURE — 3008F BODY MASS INDEX DOCD: CPT | Performed by: FAMILY MEDICINE

## 2022-01-31 RX ORDER — VALACYCLOVIR HYDROCHLORIDE 1 G/1
1000 TABLET, FILM COATED ORAL 3 TIMES DAILY
Qty: 21 TABLET | Refills: 0 | Status: SHIPPED | OUTPATIENT
Start: 2022-01-31 | End: 2022-04-06 | Stop reason: SDUPTHER

## 2022-01-31 NOTE — PATIENT INSTRUCTIONS
PLENTY FLUIDS  REST  MUCINEX  MEDICATION AS DIRECTED  IF SYMPTOMS PERSIST OR WORSEN, PLEASE CALL      TRIAL OF [facility]

## 2022-01-31 NOTE — PROGRESS NOTES
COVID-19 Outpatient Progress Note    Assessment/Plan:    Problem List Items Addressed This Visit        Other    COVID-19 - Primary    Relevant Medications    valACYclovir (VALTREX) 1,000 mg tablet    Herpes simplex    Relevant Medications    valACYclovir (VALTREX) 1,000 mg tablet         Disposition:     I recommended self-quarantine for 10 days and to watch for symptoms until 14 days after exposure  If patient were to develop symptoms, they should self isolate and call our office for further guidance  Discussed symptom directed medication options with patient  I have spent 15 minutes directly with the patient  Greater than 50% of this time was spent in counseling/coordination of care regarding: instructions for management and impressions  DEVELOPED MULTIPLE MOUTH ULCERS AND SOME GINGIVAL SWELLING AND TENDERNESS     Encounter provider Jessica Julian MD    Provider located at [address] [place] [phone]    Recent Visits  Date Type Provider Dept   01/25/22 Telephone 1501 [address] Physicians   Showing recent visits within past 7 days and meeting all other requirements  Today's Visits  Date Type Provider Dept   01/31/22 Telemedicine Jessica Julian MD [facility]   Showing today's visits and meeting all other requirements  Future Appointments  No visits were found meeting these conditions  Showing future appointments within next 150 days and meeting all other requirements     This virtual check-in was done via [employer] and patient was informed that this is a secure, HIPAA-compliant platform  She agrees to proceed  Patient agrees to participate in a virtual check in via telephone or video visit instead of presenting to the office to address urgent/immediate medical needs  Patient is aware this is a billable service      After connecting through [facility], the patient was identified by name and date of birth  Kelly Alex was informed that this was a telemedicine visit and that the exam was being conducted confidentially over secure lines  My office door was closed  No one else was in the room  Kelly Alex acknowledged consent and understanding of privacy and security of the telemedicine visit  I informed the patient that I have reviewed her record in Epic and presented the opportunity for her to ask any questions regarding the visit today  The patient agreed to participate  Verification of patient location:  Patient is located in the following state in which I hold an active license: [place]    Subjective:   Kelly Alex is a 36 y o  female who is concerned about COVID-19  Patient's symptoms include sore throat  Patient denies fever, chills, fatigue, malaise, congestion, rhinorrhea, anosmia, loss of taste, cough, shortness of breath, chest tightness, abdominal pain, nausea, vomiting, diarrhea, myalgias and headaches       - Date of symptom onset: 1/23/2022      COVID-19 vaccination status: Fully vaccinated with booster    Exposure:   Contact with a person who is under investigation (PUI) for or who is positive for COVID-19 within the last 14 days?: Yes    Hospitalized recently for fever and/or lower respiratory symptoms?: No      Currently a healthcare worker that is involved in direct patient care?: No      Works in a special setting where the risk of COVID-19 transmission may be high? (this may include long-term care, correctional and intermediate facilities; homeless shelters; assisted-living facilities and group homes ): No      Resident in a special setting where the risk of COVID-19 transmission may be high? (this may include long-term care, correctional and intermediate facilities; homeless shelters; assisted-living facilities and group homes ): No      Lab Results   Component Value Date    SARSCOV2 Negative 01/04/2022     Past Medical History:   Diagnosis Date    Atopic dermatitis     Resolved: [id number]    Bunion of great toe of right foot     Last Assessed: [id number]    Chronic sinusitis     Resolved: [id number]    Herpes labialis     Resolved: [id number]    Kidney stone     Ovarian cyst     Peptic ulcer      Past Surgical History:   Procedure Laterality Date     SECTION  2015     SECTION  10/20/2017    SINUS SURGERY      WISDOM TOOTH EXTRACTION       Current Outpatient Medications   Medication Sig Dispense Refill    ALPRAZolam (XANAX) 0 25 mg tablet TAKE 1 TABLET(0 25 MG) BY MOUTH TWICE DAILY AS NEEDED FOR ANXIETY 30 tablet 0    aspirin-acetaminophen-caffeine (Excedrin Migraine) 250-250-65 MG per tablet Take 1 tablet by mouth as needed for headaches      famotidine (PEPCID) 40 MG tablet TAKE 1 TABLET(40 MG) BY MOUTH DAILY 30 tablet 0    fluticasone (FLONASE) 50 mcg/act nasal spray 2 sprays into each nostril as needed   0    imipramine (TOFRANIL) 25 mg tablet Take 1 tablet (25 mg total) by mouth daily at bedtime 90 tablet 1    oxyCODONE-acetaminophen (PERCOCET) 5-325 mg per tablet Take 1 tablet by mouth every 6 (six) hours as needed for moderate pain Max Daily Amount: 4 tablets 30 tablet 0    tretinoin (RETIN-A) 0 01 % gel Apply topically       valACYclovir (VALTREX) 1,000 mg tablet Take 1 tablet (1,000 mg total) by mouth 3 (three) times a day for 7 days 21 tablet 0     No current facility-administered medications for this visit  Allergies   Allergen Reactions    Elavil [Amitriptyline] Drowsiness    Prednisone GI Intolerance    Tramadol Itching    Vicodin [Hydrocodone-Acetaminophen] Itching    Latex Hives       Review of Systems   Constitutional: Negative for chills, fatigue and fever  HENT: Positive for sore throat  Negative for congestion and rhinorrhea  Eyes: Negative for discharge  Respiratory: Negative for cough, chest tightness and shortness of breath  Cardiovascular: Negative for chest pain and palpitations     Gastrointestinal: Negative for abdominal pain, diarrhea, nausea and vomiting  Musculoskeletal: Negative for arthralgias, joint swelling and myalgias  Neurological: Negative for numbness and headaches  Psychiatric/Behavioral: The patient is not nervous/anxious  Objective:    Vitals:    01/31/22 1358   Weight: 78 9 kg (174 lb)   Height: 5' 4" (1 626 m)       Physical Exam     PATIENT VISUALLY APPEARS  IN NO OBVIOUS DISTRESS    VIRTUAL VISIT DISCLAIMER    Umair June verbally agrees to participate in [facility]  Pt is aware that [facility] could be limited without vital signs or the ability to perform a full hands-on physical Danilo Destiney understands she or the provider may request at any time to terminate the video visit and request the patient to seek care or treatment in person

## 2022-02-21 DIAGNOSIS — N94.6 DYSMENORRHEA: ICD-10-CM

## 2022-02-21 RX ORDER — OXYCODONE HYDROCHLORIDE AND ACETAMINOPHEN 5; 325 MG/1; MG/1
1 TABLET ORAL EVERY 6 HOURS PRN
Qty: 30 TABLET | Refills: 0 | Status: SHIPPED | OUTPATIENT
Start: 2022-02-21 | End: 2022-05-24 | Stop reason: SDUPTHER

## 2022-03-09 ENCOUNTER — TELEMEDICINE (OUTPATIENT)
Dept: FAMILY MEDICINE CLINIC | Facility: CLINIC | Age: 32
End: 2022-03-09
Payer: COMMERCIAL

## 2022-03-09 ENCOUNTER — TELEPHONE (OUTPATIENT)
Dept: FAMILY MEDICINE CLINIC | Facility: CLINIC | Age: 32
End: 2022-03-09

## 2022-03-09 VITALS — HEIGHT: 64 IN | WEIGHT: 174 LBS | BODY MASS INDEX: 29.71 KG/M2

## 2022-03-09 DIAGNOSIS — G98.8 SENSORY HYPERSENSITIVITY: Primary | ICD-10-CM

## 2022-03-09 DIAGNOSIS — F41.9 ANXIETY: ICD-10-CM

## 2022-03-09 PROCEDURE — 99213 OFFICE O/P EST LOW 20 MIN: CPT | Performed by: FAMILY MEDICINE

## 2022-03-09 PROCEDURE — 3008F BODY MASS INDEX DOCD: CPT | Performed by: FAMILY MEDICINE

## 2022-03-09 PROCEDURE — 1036F TOBACCO NON-USER: CPT | Performed by: FAMILY MEDICINE

## 2022-03-09 NOTE — PROGRESS NOTES
Virtual Regular Visit    Verification of patient location:    Patient is located in the following state in which I hold an active license [place]      Assessment/Plan:    Problem List Items Addressed This Visit     None               Reason for visit is   Chief Complaint   Patient presents with    Pt wants to discuss a couple of things   Virtual Regular Visit        Encounter provider Tyler Bernal MD    Provider located at [address] [place] [id number]      Recent Visits  No visits were found meeting these conditions  Showing recent visits within past 7 days and meeting all other requirements  Today's Visits  Date Type Provider Dept   03/09/22 Telemedicine Tyler Bernal MD [address]  Physicians   03/09/22 Telephone Selene Quintana [facility]   Showing today's visits and meeting all other requirements  Future Appointments  No visits were found meeting these conditions  Showing future appointments within next 150 days and meeting all other requirements       The patient was identified by name and date of birth  Aparna Simmons was informed that this is a telemedicine visit and that the visit is being conducted through [address] [place] [address] Now and patient was informed that this is a secure, HIPAA-compliant platform  She agrees to proceed     My office door was closed  No one else was in the room  She acknowledged consent and understanding of privacy and security of the video platform  The patient has agreed to participate and understands they can discontinue the visit at any time  Patient is aware this is a billable service  Subjective  Aparna Simmons is a 36 y o  female            DISCUSSION    PATIENT IS CONCERNED SHE HAS AN ATTENTION DEFICIT ISSUE  IS ALSO CONCERNED WITH POSSIBLE AUTISTIC FEATURES  SOME SENSORY ISSUES    DISCUSSED ISSUES    WILL RESEARCH SOME POSSIBLE [address] TESTING       Past Medical History: Diagnosis Date    Atopic dermatitis     Resolved: [id number]    Bunion of great toe of right foot     Last Assessed: [id number]    Chronic sinusitis     Resolved: [id number]    Herpes labialis     Resolved: [id number]    Kidney stone     Ovarian cyst     Peptic ulcer        Past Surgical History:   Procedure Laterality Date     SECTION  2015     SECTION  10/20/2017    SINUS SURGERY      WISDOM TOOTH EXTRACTION         Current Outpatient Medications   Medication Sig Dispense Refill    ALPRAZolam (XANAX) 0 25 mg tablet TAKE 1 TABLET(0 25 MG) BY MOUTH TWICE DAILY AS NEEDED FOR ANXIETY 30 tablet 0    aspirin-acetaminophen-caffeine (Excedrin Migraine) 250-250-65 MG per tablet Take 1 tablet by mouth as needed for headaches      famotidine (PEPCID) 40 MG tablet TAKE 1 TABLET(40 MG) BY MOUTH DAILY 30 tablet 0    fluticasone (FLONASE) 50 mcg/act nasal spray 2 sprays into each nostril as needed   0    imipramine (TOFRANIL) 25 mg tablet Take 1 tablet (25 mg total) by mouth daily at bedtime 90 tablet 1    oxyCODONE-acetaminophen (PERCOCET) 5-325 mg per tablet Take 1 tablet by mouth every 6 (six) hours as needed for moderate pain Max Daily Amount: 4 tablets 30 tablet 0    tretinoin (RETIN-A) 0 01 % gel Apply topically       valACYclovir (VALTREX) 1,000 mg tablet Take 1 tablet (1,000 mg total) by mouth 3 (three) times a day for 7 days 21 tablet 0     No current facility-administered medications for this visit  Allergies   Allergen Reactions    Elavil [Amitriptyline] Drowsiness    Prednisone GI Intolerance    Tramadol Itching    Vicodin [Hydrocodone-Acetaminophen] Itching    Latex Hives       Review of Systems   Constitutional: Negative for fever  HENT: Negative for congestion and sore throat  Eyes: Negative for discharge  Respiratory: Negative for chest tightness  Cardiovascular: Negative for chest pain and palpitations     Gastrointestinal: Negative for abdominal pain, diarrhea, nausea and vomiting  Musculoskeletal: Negative for arthralgias and joint swelling  Neurological: Negative for numbness  Psychiatric/Behavioral: Positive for decreased concentration  The patient is nervous/anxious  Video Exam    Vitals:    03/09/22 1235   Weight: 78 9 kg (174 lb)   Height: 5' 4" (1 626 m)       Physical Exam     I spent 25 minutes directly with the patient during this visit    VIRTUAL VISIT DISCLAIMER      Sussy Tasha verbally agrees to participate in [employer]  Pt is aware that [employer] could be limited without vital signs or the ability to perform a full hands-on physical Marselam Blue understands she or the provider may request at any time to terminate the video visit and request the patient to seek care or treatment in person

## 2022-03-09 NOTE — TELEPHONE ENCOUNTER
Pipo Roberson saw her dentist and they recommended for her to see an ENT for her TMJ    What do you think and who would you recommended    After asking this information, she had another question  She is scheduled for a virtual with you this afternoon to discuss ADHD and autism

## 2022-03-10 DIAGNOSIS — R10.13 DYSPEPSIA: ICD-10-CM

## 2022-03-10 RX ORDER — FAMOTIDINE 40 MG/1
TABLET, FILM COATED ORAL
Qty: 30 TABLET | Refills: 0 | Status: SHIPPED | OUTPATIENT
Start: 2022-03-10 | End: 2022-05-31

## 2022-03-13 NOTE — PATIENT INSTRUCTIONS
CONTINUE CURRENT TREATMENT PLAN    WILL RESEARCH PROVIDERS WHO MAYBE ABLE TO DO SOME TESTING FOR POSIBLE FOCUSING ISSUES    FURTHER PLANS PENDING EVALAUTION

## 2022-04-06 DIAGNOSIS — B00.9 HERPES SIMPLEX: ICD-10-CM

## 2022-04-06 RX ORDER — VALACYCLOVIR HYDROCHLORIDE 1 G/1
1000 TABLET, FILM COATED ORAL 3 TIMES DAILY
Qty: 21 TABLET | Refills: 0 | Status: SHIPPED | OUTPATIENT
Start: 2022-04-06 | End: 2022-04-13

## 2022-04-06 RX ORDER — VALACYCLOVIR HYDROCHLORIDE 1 G/1
1000 TABLET, FILM COATED ORAL 3 TIMES DAILY
Qty: 21 TABLET | Refills: 0 | Status: SHIPPED | OUTPATIENT
Start: 2022-04-06 | End: 2022-04-06 | Stop reason: SDUPTHER

## 2022-05-02 DIAGNOSIS — G43.909 MIGRAINE WITHOUT STATUS MIGRAINOSUS, NOT INTRACTABLE, UNSPECIFIED MIGRAINE TYPE: ICD-10-CM

## 2022-05-02 DIAGNOSIS — G89.29 CHRONIC NONINTRACTABLE HEADACHE, UNSPECIFIED HEADACHE TYPE: ICD-10-CM

## 2022-05-02 DIAGNOSIS — R51.9 CHRONIC NONINTRACTABLE HEADACHE, UNSPECIFIED HEADACHE TYPE: ICD-10-CM

## 2022-05-02 RX ORDER — IMIPRAMINE HCL 25 MG
TABLET ORAL
Qty: 90 TABLET | Refills: 1 | Status: SHIPPED | OUTPATIENT
Start: 2022-05-02 | End: 2022-05-27 | Stop reason: SDUPTHER

## 2022-05-24 DIAGNOSIS — N94.6 DYSMENORRHEA: ICD-10-CM

## 2022-05-24 RX ORDER — OXYCODONE HYDROCHLORIDE AND ACETAMINOPHEN 5; 325 MG/1; MG/1
1 TABLET ORAL EVERY 6 HOURS PRN
Qty: 30 TABLET | Refills: 0 | Status: SHIPPED | OUTPATIENT
Start: 2022-05-24

## 2022-05-26 ENCOUNTER — OFFICE VISIT (OUTPATIENT)
Dept: FAMILY MEDICINE CLINIC | Facility: CLINIC | Age: 32
End: 2022-05-26
Payer: COMMERCIAL

## 2022-05-26 VITALS
HEIGHT: 64 IN | TEMPERATURE: 98.4 F | HEART RATE: 104 BPM | WEIGHT: 177 LBS | OXYGEN SATURATION: 98 % | RESPIRATION RATE: 12 BRPM | DIASTOLIC BLOOD PRESSURE: 84 MMHG | SYSTOLIC BLOOD PRESSURE: 120 MMHG | BODY MASS INDEX: 30.22 KG/M2

## 2022-05-26 DIAGNOSIS — Z87.42 HISTORY OF OVARIAN CYST: ICD-10-CM

## 2022-05-26 DIAGNOSIS — R10.2 PELVIC PAIN: Primary | ICD-10-CM

## 2022-05-26 PROBLEM — J01.40 ACUTE NON-RECURRENT PANSINUSITIS: Status: RESOLVED | Noted: 2021-12-02 | Resolved: 2022-05-26

## 2022-05-26 LAB
SL AMB  POCT GLUCOSE, UA: NORMAL
SL AMB LEUKOCYTE ESTERASE,UA: ABNORMAL
SL AMB POCT BILIRUBIN,UA: ABNORMAL
SL AMB POCT BLOOD,UA: 250
SL AMB POCT CLARITY,UA: CLEAR
SL AMB POCT COLOR,UA: YELLOW
SL AMB POCT KETONES,UA: ABNORMAL
SL AMB POCT NITRITE,UA: ABNORMAL
SL AMB POCT PH,UA: 8
SL AMB POCT SPECIFIC GRAVITY,UA: 1.01
SL AMB POCT URINE PROTEIN: ABNORMAL
SL AMB POCT UROBILINOGEN: NORMAL

## 2022-05-26 PROCEDURE — 99214 OFFICE O/P EST MOD 30 MIN: CPT | Performed by: FAMILY MEDICINE

## 2022-05-26 PROCEDURE — 81003 URINALYSIS AUTO W/O SCOPE: CPT | Performed by: FAMILY MEDICINE

## 2022-05-26 PROCEDURE — 3008F BODY MASS INDEX DOCD: CPT | Performed by: FAMILY MEDICINE

## 2022-05-26 PROCEDURE — 1036F TOBACCO NON-USER: CPT | Performed by: FAMILY MEDICINE

## 2022-05-26 NOTE — PROGRESS NOTES
Assessment/Plan:    No problem-specific Assessment & Plan notes found for this encounter  Diagnoses and all orders for this visit:    Pelvic pain  -     POCT urine dip auto non-scope  -     US pelvis complete w transvaginal; Future  -     Pap Lb (Liquid-based)  -     Urine culture    History of ovarian cyst  -     US pelvis complete w transvaginal; Future  -     Pap Lb (Liquid-based)    Other orders  -     Result        Patient Instructions   HYDRATION  MONITOR SYMPTOMS    PELVIC US  ALEVE BID    CONSIDER GYN CONSULT    FURTHER PLANS PENDING EVAL        Return if symptoms worsen or fail to improve, for Next scheduled follow up  Subjective:      Patient ID: Rupinder Barreto is a 36 y o  female  Chief Complaint   Patient presents with    Hip Pain     Pt c/o left hip and pelvic pain  Pelvic Pain  The patient's primary symptoms include pelvic pain  The patient's pertinent negatives include no genital itching, genital lesions, genital odor, genital rash, missed menses, vaginal bleeding or vaginal discharge  This is a new problem  The current episode started 1 to 4 weeks ago  The problem occurs 2 to 4 times per day  The problem has been gradually worsening  The pain is mild  The problem affects the left side  She is not pregnant  Associated symptoms include abdominal pain  Pertinent negatives include no anorexia, back pain, chills, constipation, diarrhea, discolored urine, dysuria, fever, flank pain, frequency, headaches, hematuria, joint pain, joint swelling, nausea, painful intercourse, rash, sore throat, urgency or vomiting  The symptoms are aggravated by activity  She has tried acetaminophen for the symptoms  The treatment provided mild relief  Her past medical history is significant for ovarian cysts         The following portions of the patient's history were reviewed and updated as appropriate: allergies, current medications, past family history, past medical history, past social history, past surgical history and problem list     Review of Systems   Constitutional: Positive for fatigue  Negative for chills and fever  HENT: Negative for congestion, ear discharge, ear pain, mouth sores, postnasal drip, sore throat and trouble swallowing  Eyes: Negative for pain, discharge and visual disturbance  Respiratory: Negative for cough, shortness of breath and wheezing  Cardiovascular: Negative for chest pain, palpitations and leg swelling  Gastrointestinal: Positive for abdominal pain  Negative for abdominal distention, anorexia, blood in stool, constipation, diarrhea, nausea and vomiting  Endocrine: Negative for polydipsia, polyphagia and polyuria  Genitourinary: Positive for pelvic pain  Negative for dysuria, flank pain, frequency, hematuria, missed menses, urgency and vaginal discharge  Musculoskeletal: Negative for arthralgias, back pain, gait problem, joint pain and joint swelling  Skin: Negative for pallor and rash  Neurological: Negative for dizziness, syncope, speech difficulty, weakness, light-headedness, numbness and headaches  Hematological: Negative for adenopathy  Psychiatric/Behavioral: Negative for behavioral problems, confusion and sleep disturbance  The patient is not nervous/anxious            Current Outpatient Medications   Medication Sig Dispense Refill    ALPRAZolam (XANAX) 0 25 mg tablet TAKE 1 TABLET(0 25 MG) BY MOUTH TWICE DAILY AS NEEDED FOR ANXIETY 30 tablet 0    aspirin-acetaminophen-caffeine (EXCEDRIN MIGRAINE) 250-250-65 MG per tablet Take 1 tablet by mouth as needed for headaches      famotidine (PEPCID) 40 MG tablet TAKE 1 TABLET(40 MG) BY MOUTH DAILY 30 tablet 0    fluticasone (FLONASE) 50 mcg/act nasal spray 2 sprays into each nostril as needed   0    oxyCODONE-acetaminophen (PERCOCET) 5-325 mg per tablet Take 1 tablet by mouth every 6 (six) hours as needed for moderate pain Max Daily Amount: 4 tablets 30 tablet 0    tretinoin (RETIN-A) 0 01 % gel Apply topically       imipramine (TOFRANIL) 10 mg tablet Take 1 tablet (10 mg total) by mouth daily at bedtime 90 tablet 1    valACYclovir (VALTREX) 1,000 mg tablet Take 1 tablet (1,000 mg total) by mouth 3 (three) times a day for 7 days 21 tablet 0     No current facility-administered medications for this visit  Objective:    /84 (BP Location: Right arm, Patient Position: Sitting, Cuff Size: Large)   Pulse 104   Temp 98 4 °F (36 9 °C) (Temporal)   Resp 12   Ht 5' 4" (1 626 m)   Wt 80 3 kg (177 lb)   LMP 04/26/2022 (Approximate)   SpO2 98%   BMI 30 38 kg/m²        Physical Exam  Constitutional:       Appearance: She is well-developed  HENT:      Head: Normocephalic and atraumatic  Eyes:      General:         Right eye: No discharge  Left eye: No discharge  Conjunctiva/sclera: Conjunctivae normal       Pupils: Pupils are equal, round, and reactive to light  Neck:      Thyroid: No thyromegaly  Cardiovascular:      Rate and Rhythm: Normal rate and regular rhythm  Heart sounds: Normal heart sounds  No murmur heard  Pulmonary:      Effort: Pulmonary effort is normal  No respiratory distress  Breath sounds: Normal breath sounds  No wheezing or rales  Abdominal:      General: Abdomen is flat  Bowel sounds are normal       Palpations: Abdomen is soft  Tenderness: There is abdominal tenderness  Comments: SOFT  FLAT  BS PRESENT   NO HSM  NO MASSES  LLQ  / PELVIC TENDERNESS   Genitourinary:     General: Normal vulva  Vagina: No vaginal discharge  Rectum: Normal       Comments: MILD L ADNEXAL TENDERNESS  NO CERVICAL MOTION TENDERNESS  Musculoskeletal:         General: No tenderness  Normal range of motion  Cervical back: Normal range of motion and neck supple  Lymphadenopathy:      Cervical: No cervical adenopathy  Skin:     General: Skin is warm and dry  Findings: No erythema or rash     Neurological:      Mental Status: She is alert and oriented to person, place, and time  Psychiatric:         Behavior: Behavior normal          Thought Content:  Thought content normal          Judgment: Judgment normal                 Divya Kelsey MD

## 2022-05-26 NOTE — PATIENT INSTRUCTIONS
HYDRATION  MONITOR SYMPTOMS    PELVIC US  ALEVE BID    CONSIDER GYN CONSULT    FURTHER PLANS PENDING EVAL

## 2022-05-27 DIAGNOSIS — R51.9 CHRONIC NONINTRACTABLE HEADACHE, UNSPECIFIED HEADACHE TYPE: ICD-10-CM

## 2022-05-27 DIAGNOSIS — G89.29 CHRONIC NONINTRACTABLE HEADACHE, UNSPECIFIED HEADACHE TYPE: ICD-10-CM

## 2022-05-27 DIAGNOSIS — G43.909 MIGRAINE WITHOUT STATUS MIGRAINOSUS, NOT INTRACTABLE, UNSPECIFIED MIGRAINE TYPE: ICD-10-CM

## 2022-05-27 RX ORDER — IMIPRAMINE HYDROCHLORIDE 10 MG/1
10 TABLET, FILM COATED ORAL
Qty: 90 TABLET | Refills: 1 | Status: SHIPPED | OUTPATIENT
Start: 2022-05-27 | End: 2022-12-05 | Stop reason: HOSPADM

## 2022-05-28 LAB
BACTERIA UR CULT: NORMAL
Lab: NORMAL

## 2022-05-31 DIAGNOSIS — R10.13 DYSPEPSIA: ICD-10-CM

## 2022-05-31 LAB
CYTOLOGIST CVX/VAG CYTO: NORMAL
DX ICD CODE: NORMAL
OTHER STN SPEC: NORMAL
PATH REPORT.FINAL DX SPEC: NORMAL
SL AMB NOTE:: NORMAL
SL AMB SPECIMEN ADEQUACY: NORMAL

## 2022-05-31 RX ORDER — FAMOTIDINE 40 MG/1
TABLET, FILM COATED ORAL
Qty: 30 TABLET | Refills: 0 | Status: SHIPPED | OUTPATIENT
Start: 2022-05-31 | End: 2022-07-07

## 2022-06-23 DIAGNOSIS — B37.3 YEAST VAGINITIS: ICD-10-CM

## 2022-06-23 RX ORDER — FLUCONAZOLE 150 MG/1
TABLET ORAL
Qty: 2 TABLET | Refills: 3 | Status: SHIPPED | OUTPATIENT
Start: 2022-06-23 | End: 2022-06-30

## 2022-07-06 DIAGNOSIS — R10.13 DYSPEPSIA: ICD-10-CM

## 2022-07-07 RX ORDER — FAMOTIDINE 40 MG/1
TABLET, FILM COATED ORAL
Qty: 30 TABLET | Refills: 0 | Status: SHIPPED | OUTPATIENT
Start: 2022-07-07 | End: 2022-08-16

## 2022-08-03 ENCOUNTER — TELEMEDICINE (OUTPATIENT)
Dept: FAMILY MEDICINE CLINIC | Facility: CLINIC | Age: 32
End: 2022-08-03
Payer: COMMERCIAL

## 2022-08-03 VITALS — BODY MASS INDEX: 30.22 KG/M2 | WEIGHT: 177 LBS | HEIGHT: 64 IN

## 2022-08-03 DIAGNOSIS — R53.83 FATIGUE, UNSPECIFIED TYPE: ICD-10-CM

## 2022-08-03 DIAGNOSIS — E16.1 REACTIVE HYPOGLYCEMIA: Primary | ICD-10-CM

## 2022-08-03 PROCEDURE — 3725F SCREEN DEPRESSION PERFORMED: CPT | Performed by: FAMILY MEDICINE

## 2022-08-03 PROCEDURE — 99213 OFFICE O/P EST LOW 20 MIN: CPT | Performed by: FAMILY MEDICINE

## 2022-08-03 RX ORDER — IBUPROFEN 600 MG/1
TABLET ORAL AS NEEDED
COMMUNITY
Start: 2022-05-28 | End: 2022-12-02 | Stop reason: HOSPADM

## 2022-08-03 NOTE — PATIENT INSTRUCTIONS
HYDRATION  DISCUSSED REACTIVE HYPOGLYCEMIC DIET  5 SMALL MEALS  INCREASE PROTEIN AND FAT CONTENT    MONITOR SYMPTOMS    CALL / MESSAGE IN 2-3 WEEKS WITH UPDATE, SOONER PRN    MAY NEED TO DRAW SOME METABOLIC PARAMETERS / CORTISOL LEVELS

## 2022-08-03 NOTE — PROGRESS NOTES
Virtual Regular Visit    Verification of patient location:    Patient is located in the following state in which I hold an active license [place]      Assessment/Plan:    Problem List Items Addressed This Visit        Digestive    Reactive hypoglycemia - Primary       Other    Fatigue            Depression Screening and Follow-up Plan: Patient was screened for depression during today's encounter  They screened negative with a PHQ-2 score of 0  Reason for visit is   Chief Complaint   Patient presents with    Fatigue     Extreme - patient states she feels better when she eats     Virtual Regular Visit        Encounter provider Sy Casas MD    Provider located at [address] [place] [address]  [phone]      Recent Visits  No visits were found meeting these conditions  Showing recent visits within past 7 days and meeting all other requirements  Today's Visits  Date Type Provider Dept   08/03/22 Telemedicine Sy Casas MD [facility]   Showing today's visits and meeting all other requirements  Future Appointments  No visits were found meeting these conditions  Showing future appointments within next 150 days and meeting all other requirements       The patient was identified by name and date of birth  Ignacio Traylor was informed that this is a telemedicine visit and that the visit is being conducted through [address] [place] [address] Now and patient was informed that this is a secure, HIPAA-compliant platform  She agrees to proceed     My office door was closed  No one else was in the room  She acknowledged consent and understanding of privacy and security of the video platform  The patient has agreed to participate and understands they can discontinue the visit at any time  Patient is aware this is a billable service  Subjective  Ignacio Traylor is a 36 y o  female            DISCUSSION    PATIENT C/O EPISODES OF FATIGUE / Everlena Ao OFTEN  SEEMS TO IMPROVE DRAMATICALLY AFTER EATING SOMETHING - USUALLY A CARBOHYDRATE  SL HEADACHE  DENIES ANY CP, SOB, PALPITATIONS  NO VD ALTHOUGH SL NAUSEOUS AT TIMES    DISCUSSED AT LENGTH SYMPTOMS AND THERAPEUTIC OPTIONS       Past Medical History:   Diagnosis Date    Atopic dermatitis     Resolved: 2016    Bunion of great toe of right foot     Last Assessed: 2015    Chronic sinusitis     Resolved: [id number]    Herpes labialis     Resolved: [id number]    Kidney stone     Ovarian cyst     Peptic ulcer        Past Surgical History:   Procedure Laterality Date     SECTION  2015     SECTION  10/20/2017    SINUS SURGERY      WISDOM TOOTH EXTRACTION         Current Outpatient Medications   Medication Sig Dispense Refill    ALPRAZolam (XANAX) 0 25 mg tablet TAKE 1 TABLET(0 25 MG) BY MOUTH TWICE DAILY AS NEEDED FOR ANXIETY 30 tablet 0    aspirin-acetaminophen-caffeine (EXCEDRIN MIGRAINE) 250-250-65 MG per tablet Take 1 tablet by mouth as needed for headaches      Barberry-Oreg Grape-Goldenseal (BERBERINE COMPLEX PO) Take by mouth 2 (two) times a day 500 mg twice daily      famotidine (PEPCID) 40 MG tablet TAKE 1 TABLET(40 MG) BY MOUTH DAILY (Patient taking differently: if needed) 30 tablet 0    fluticasone (FLONASE) 50 mcg/act nasal spray 2 sprays into each nostril as needed   0    ibuprofen (MOTRIN) 600 mg tablet if needed      oxyCODONE-acetaminophen (PERCOCET) 5-325 mg per tablet Take 1 tablet by mouth every 6 (six) hours as needed for moderate pain Max Daily Amount: 4 tablets 30 tablet 0    tretinoin (RETIN-A) 0 01 % gel Apply topically       imipramine (TOFRANIL) 10 mg tablet Take 1 tablet (10 mg total) by mouth daily at bedtime (Patient not taking: Reported on 8/3/2022) 90 tablet 1    valACYclovir (VALTREX) 1,000 mg tablet Take 1 tablet (1,000 mg total) by mouth 3 (three) times a day for 7 days 21 tablet 0     No current facility-administered medications for this visit  Allergies   Allergen Reactions    Elavil [Amitriptyline] Drowsiness    Prednisone GI Intolerance    Tramadol Itching    Vicodin [Hydrocodone-Acetaminophen] Itching    Latex Hives       Review of Systems   Constitutional: Negative for fever  HENT: Negative for congestion and sore throat  Eyes: Negative for discharge  Respiratory: Negative for chest tightness  Cardiovascular: Negative for chest pain and palpitations  Gastrointestinal: Negative for abdominal pain, diarrhea, nausea and vomiting  Musculoskeletal: Negative for arthralgias and joint swelling  Neurological: Positive for dizziness and headaches  Negative for numbness  Psychiatric/Behavioral: The patient is not nervous/anxious  Video Exam    Vitals:    08/03/22 1258   Weight: 80 3 kg (177 lb)   Height: 5' 4" (1 626 m)       Physical Exam     PATIENT VISUALLY APPEARS WELL IN NO OBVIOUS DISTRESS      I spent 20 minutes directly with the patient during this visit    VIRTUAL VISIT DISCLAIMER      Evelyn Lewis verbally agrees to participate in [employer]  Pt is aware that [employer] could be limited without vital signs or the ability to perform a full hands-on physical Ethradha Vasquez understands she or the provider may request at any time to terminate the video visit and request the patient to seek care or treatment in person

## 2022-08-04 ENCOUNTER — TELEPHONE (OUTPATIENT)
Dept: FAMILY MEDICINE CLINIC | Facility: CLINIC | Age: 32
End: 2022-08-04

## 2022-08-04 NOTE — TELEPHONE ENCOUNTER
Viviane López states she and Vickie Randle are going away this weekend and staying in a hotel in [place]  She asked if she needs to worry about the bed sheets with Monkey Pox going around?     [phone]

## 2022-08-15 DIAGNOSIS — R10.13 DYSPEPSIA: ICD-10-CM

## 2022-08-16 RX ORDER — FAMOTIDINE 40 MG/1
TABLET, FILM COATED ORAL
Qty: 30 TABLET | Refills: 0 | Status: SHIPPED | OUTPATIENT
Start: 2022-08-16 | End: 2022-10-03

## 2022-08-18 DIAGNOSIS — B00.9 HERPES SIMPLEX: ICD-10-CM

## 2022-08-19 RX ORDER — VALACYCLOVIR HYDROCHLORIDE 1 G/1
TABLET, FILM COATED ORAL
Qty: 21 TABLET | Refills: 0 | Status: SHIPPED | OUTPATIENT
Start: 2022-08-19 | End: 2022-10-27

## 2022-08-19 NOTE — TELEPHONE ENCOUNTER
Requested medication(s) are due for refill today: No  Patient has already received a courtesy refill: No  Other reason request has been forwarded to provider: TAKE 1 TABLET(1000 MG) BY MOUTH THREE TIMES DAILY FOR 7 DAYS

## 2022-08-23 DIAGNOSIS — Z13.29 SCREENING FOR HYPOTHYROIDISM: ICD-10-CM

## 2022-08-23 DIAGNOSIS — Z13.220 SCREENING FOR HYPERLIPIDEMIA: ICD-10-CM

## 2022-08-23 DIAGNOSIS — Z13.6 SCREENING FOR HYPERTENSION: ICD-10-CM

## 2022-08-23 DIAGNOSIS — Z00.00 ROUTINE GENERAL MEDICAL EXAMINATION AT A HEALTH CARE FACILITY: Primary | ICD-10-CM

## 2022-08-24 DIAGNOSIS — N94.6 DYSMENORRHEA: ICD-10-CM

## 2022-08-25 RX ORDER — OXYCODONE HYDROCHLORIDE AND ACETAMINOPHEN 5; 325 MG/1; MG/1
1 TABLET ORAL EVERY 6 HOURS PRN
Qty: 30 TABLET | Refills: 0 | Status: SHIPPED | OUTPATIENT
Start: 2022-08-25

## 2022-09-13 ENCOUNTER — TELEPHONE (OUTPATIENT)
Dept: FAMILY MEDICINE CLINIC | Facility: CLINIC | Age: 32
End: 2022-09-13

## 2022-09-13 DIAGNOSIS — R39.9 UTI SYMPTOMS: Primary | ICD-10-CM

## 2022-09-13 NOTE — TELEPHONE ENCOUNTER
Crissy Aguilar called and stated that she received a message from Ray Vasquez stating she needs an appt to drop off her urine  She is unable to make it in the office    She wanted to know if you could place an order to [facility] for her to go there and test

## 2022-09-14 NOTE — TELEPHONE ENCOUNTER
Spoke to Rafael, informed her that orders have been placed,         Inez Delaney called and stated that [facility] cannot see them Catie Flaming faxed order to [phone] and we received confirmation

## 2022-09-15 LAB
APPEARANCE UR: CLEAR
BILIRUB UR QL STRIP: NEGATIVE
COLOR UR: YELLOW
GLUCOSE UR QL: NEGATIVE
HGB UR QL STRIP: NEGATIVE
KETONES UR QL STRIP: NEGATIVE
LEUKOCYTE ESTERASE UR QL STRIP: NEGATIVE
MICRO URNS: ABNORMAL
NITRITE UR QL STRIP: NEGATIVE
PH UR STRIP: 6 [PH] (ref 5–7.5)
PROT UR QL STRIP: NEGATIVE
SP GR UR: >=1.03 (ref 1–1.03)
UROBILINOGEN UR STRIP-ACNC: 0.2 MG/DL (ref 0.2–1)

## 2022-09-16 LAB
BASOPHILS # BLD AUTO: 0 X10E3/UL (ref 0–0.2)
BASOPHILS NFR BLD AUTO: 1 %
EOSINOPHIL # BLD AUTO: 0.1 X10E3/UL (ref 0–0.4)
EOSINOPHIL NFR BLD AUTO: 1 %
ERYTHROCYTE [DISTWIDTH] IN BLOOD BY AUTOMATED COUNT: 13.2 % (ref 11.7–15.4)
HCT VFR BLD AUTO: 38.2 % (ref 34–46.6)
HGB BLD-MCNC: 12.5 G/DL (ref 11.1–15.9)
IMM GRANULOCYTES # BLD: 0 X10E3/UL (ref 0–0.1)
IMM GRANULOCYTES NFR BLD: 0 %
LYMPHOCYTES # BLD AUTO: 1.7 X10E3/UL (ref 0.7–3.1)
LYMPHOCYTES NFR BLD AUTO: 30 %
MCH RBC QN AUTO: 26.3 PG (ref 26.6–33)
MCHC RBC AUTO-ENTMCNC: 32.7 G/DL (ref 31.5–35.7)
MCV RBC AUTO: 80 FL (ref 79–97)
MONOCYTES # BLD AUTO: 0.4 X10E3/UL (ref 0.1–0.9)
MONOCYTES NFR BLD AUTO: 7 %
NEUTROPHILS # BLD AUTO: 3.5 X10E3/UL (ref 1.4–7)
NEUTROPHILS NFR BLD AUTO: 61 %
PLATELET # BLD AUTO: 292 X10E3/UL (ref 150–450)
RBC # BLD AUTO: 4.76 X10E6/UL (ref 3.77–5.28)
WBC # BLD AUTO: 5.7 X10E3/UL (ref 3.4–10.8)

## 2022-09-17 LAB
ALBUMIN SERPL-MCNC: 4.2 G/DL (ref 3.9–5)
ALBUMIN/GLOB SERPL: 1.6 {RATIO} (ref 1.2–2.2)
ALP SERPL-CCNC: 78 IU/L (ref 44–121)
ALT SERPL-CCNC: 17 IU/L (ref 0–32)
AST SERPL-CCNC: 19 IU/L (ref 0–40)
BILIRUB SERPL-MCNC: 0.4 MG/DL (ref 0–1.2)
BUN SERPL-MCNC: 13 MG/DL (ref 6–20)
BUN/CREAT SERPL: 18 (ref 9–23)
CALCIUM SERPL-MCNC: 9.4 MG/DL (ref 8.7–10.2)
CHLORIDE SERPL-SCNC: 102 MMOL/L (ref 96–106)
CHOLEST SERPL-MCNC: 166 MG/DL (ref 100–199)
CHOLEST/HDLC SERPL: 4.4 RATIO (ref 0–4.4)
CO2 SERPL-SCNC: 20 MMOL/L (ref 20–29)
CREAT SERPL-MCNC: 0.72 MG/DL (ref 0.57–1)
EGFR: 116 ML/MIN/1.73
GLOBULIN SER-MCNC: 2.6 G/DL (ref 1.5–4.5)
GLUCOSE SERPL-MCNC: 93 MG/DL (ref 65–99)
HDLC SERPL-MCNC: 38 MG/DL
LDLC SERPL CALC-MCNC: 103 MG/DL (ref 0–99)
POTASSIUM SERPL-SCNC: 4 MMOL/L (ref 3.5–5.2)
PROT SERPL-MCNC: 6.8 G/DL (ref 6–8.5)
SL AMB VLDL CHOLESTEROL CALC: 25 MG/DL (ref 5–40)
SODIUM SERPL-SCNC: 139 MMOL/L (ref 134–144)
TRIGL SERPL-MCNC: 137 MG/DL (ref 0–149)
TSH SERPL DL<=0.005 MIU/L-ACNC: 2.1 UIU/ML (ref 0.45–4.5)

## 2022-10-19 ENCOUNTER — TELEPHONE (OUTPATIENT)
Dept: FAMILY MEDICINE CLINIC | Facility: CLINIC | Age: 32
End: 2022-10-19

## 2022-10-19 NOTE — TELEPHONE ENCOUNTER
Called and had a few questions regarding covid    Her brother in law and sister in law are coming over the weekend  The brother in law thinks that someone that he was with may have tested positive for covid  I explained to have him monitor for symptoms  If he feels anything take a test before coming  If they are uncomfortable, have the family wear masks

## 2022-10-27 DIAGNOSIS — B00.9 HERPES SIMPLEX: ICD-10-CM

## 2022-10-27 RX ORDER — VALACYCLOVIR HYDROCHLORIDE 1 G/1
TABLET, FILM COATED ORAL
Qty: 21 TABLET | Refills: 0 | Status: SHIPPED | OUTPATIENT
Start: 2022-10-27 | End: 2022-11-03

## 2022-10-29 ENCOUNTER — PATIENT MESSAGE (OUTPATIENT)
Dept: FAMILY MEDICINE CLINIC | Facility: CLINIC | Age: 32
End: 2022-10-29

## 2022-10-29 DIAGNOSIS — N94.6 DYSMENORRHEA: Primary | ICD-10-CM

## 2022-10-29 DIAGNOSIS — N64.3 GALACTORRHEA IN FEMALE: ICD-10-CM

## 2022-10-29 DIAGNOSIS — R63.5 WEIGHT GAIN: ICD-10-CM

## 2022-11-23 DIAGNOSIS — R10.13 DYSPEPSIA: ICD-10-CM

## 2022-11-23 DIAGNOSIS — N94.6 DYSMENORRHEA: ICD-10-CM

## 2022-11-23 RX ORDER — OXYCODONE HYDROCHLORIDE AND ACETAMINOPHEN 5; 325 MG/1; MG/1
1 TABLET ORAL EVERY 6 HOURS PRN
Qty: 30 TABLET | Refills: 0 | Status: SHIPPED | OUTPATIENT
Start: 2022-11-23

## 2022-11-23 RX ORDER — FAMOTIDINE 40 MG/1
40 TABLET, FILM COATED ORAL DAILY
Qty: 90 TABLET | Refills: 0 | OUTPATIENT
Start: 2022-11-23

## 2022-11-23 NOTE — TELEPHONE ENCOUNTER
Requested medication(s) are due for refill today: Yes - med ck appt made for 12/5/22  Patient has already received a courtesy refill: No  Other reason request has been forwarded to provider:  This refill cannot be delegated

## 2022-12-05 ENCOUNTER — TELEMEDICINE (OUTPATIENT)
Dept: FAMILY MEDICINE CLINIC | Facility: CLINIC | Age: 32
End: 2022-12-05

## 2022-12-05 VITALS — WEIGHT: 177 LBS | BODY MASS INDEX: 30.22 KG/M2 | HEIGHT: 64 IN

## 2022-12-05 DIAGNOSIS — M54.6 CHRONIC BILATERAL THORACIC BACK PAIN: ICD-10-CM

## 2022-12-05 DIAGNOSIS — R10.13 DYSPEPSIA: ICD-10-CM

## 2022-12-05 DIAGNOSIS — G89.29 CHRONIC BILATERAL THORACIC BACK PAIN: ICD-10-CM

## 2022-12-05 DIAGNOSIS — G43.909 MIGRAINE WITHOUT STATUS MIGRAINOSUS, NOT INTRACTABLE, UNSPECIFIED MIGRAINE TYPE: Primary | ICD-10-CM

## 2022-12-05 PROBLEM — U07.1 COVID-19: Status: RESOLVED | Noted: 2022-01-31 | Resolved: 2022-12-05

## 2022-12-05 PROBLEM — M43.6 NECK STIFFNESS: Status: RESOLVED | Noted: 2021-11-10 | Resolved: 2022-12-05

## 2022-12-05 PROBLEM — B00.9 HERPES SIMPLEX: Status: RESOLVED | Noted: 2022-01-31 | Resolved: 2022-12-05

## 2022-12-05 NOTE — PROGRESS NOTES
Virtual Regular Visit    Verification of patient location:    Patient is located in the following state in which I hold an active license [place]      Assessment/Plan:    Problem List Items Addressed This Visit        Cardiovascular and Mediastinum    Migraine without status migrainosus, not intractable - Primary       Other    Chronic bilateral thoracic back pain    Dyspepsia            Reason for visit is   Chief Complaint   Patient presents with   • Medication Management   • Virtual Regular Visit        Encounter provider Lanny Rios MD    Provider located at [address] [place] [address]  [id number]      Recent Visits  No visits were found meeting these conditions  Showing recent visits within past 7 days and meeting all other requirements  Today's Visits  Date Type Provider Dept   12/05/22 Telemedicine Lanny Rios MD [facility]   Showing today's visits and meeting all other requirements  Future Appointments  No visits were found meeting these conditions  Showing future appointments within next 150 days and meeting all other requirements       The patient was identified by name and date of birth  Kay Ozuna was informed that this is a telemedicine visit and that the visit is being conducted through the [address] [employer] platform  She agrees to proceed     My office door was closed  No one else was in the room  She acknowledged consent and understanding of privacy and security of the video platform  The patient has agreed to participate and understands they can discontinue the visit at any time  Patient is aware this is a billable service  Subjective  Kay Ozuna is a 36 y o  female            FOLLOW UP MED CHECK    DOING OK  MEDICATION KEEPING THINGS STABLE  NO CONCERNS AT THIS TIME  GETTING SOME LOWER BODY STIFFNESS,WHEN ACTIVE - RECOMMENDED STRETCHING AND ADVIL WHEN NEEDED    NO OTHER CONCERNS       Past Medical History:   Diagnosis Date   • Atopic dermatitis     Resolved: [id number]   • Bunion of great toe of right foot     Last Assessed: 2015   • Chronic sinusitis     Resolved: [id number]   • Herpes labialis     Resolved: [id number]   • Kidney stone    • Ovarian cyst    • Peptic ulcer        Past Surgical History:   Procedure Laterality Date   •  SECTION  2015   •  SECTION  10/20/2017   • SINUS SURGERY     • WISDOM TOOTH EXTRACTION         Current Outpatient Medications   Medication Sig Dispense Refill   • aspirin-acetaminophen-caffeine (EXCEDRIN MIGRAINE) 250-250-65 MG per tablet Take 1 tablet by mouth as needed for headaches     • Barberry-Oreg Grape-Goldenseal (BERBERINE COMPLEX PO) Take by mouth 2 (two) times a day 500 mg twice daily     • famotidine (PEPCID) 40 MG tablet TAKE 1 TABLET(40 MG) BY MOUTH DAILY 90 tablet 0   • fluticasone (FLONASE) 50 mcg/act nasal spray 2 sprays into each nostril as needed   0   • oxyCODONE-acetaminophen (PERCOCET) 5-325 mg per tablet Take 1 tablet by mouth every 6 (six) hours as needed for moderate pain Max Daily Amount: 4 tablets 30 tablet 0   • tretinoin (RETIN-A) 0 01 % gel Apply topically      • valACYclovir (VALTREX) 1,000 mg tablet TAKE 1 TABLET(1000 MG) BY MOUTH THREE TIMES DAILY FOR 7 DAYS 21 tablet 0     No current facility-administered medications for this visit  Allergies   Allergen Reactions   • Elavil [Amitriptyline] Drowsiness   • Prednisone GI Intolerance   • Tramadol Itching   • Vicodin [Hydrocodone-Acetaminophen] Itching   • Latex Hives       Review of Systems   Constitutional: Negative for chills, fatigue and fever  HENT: Negative for congestion, ear discharge, ear pain, mouth sores, postnasal drip, sore throat and trouble swallowing  Eyes: Negative for pain, discharge and visual disturbance  Respiratory: Negative for cough, shortness of breath and wheezing  Cardiovascular: Negative for chest pain, palpitations and leg swelling  Gastrointestinal: Negative for abdominal distention, abdominal pain, blood in stool, diarrhea and nausea  Endocrine: Negative for polydipsia, polyphagia and polyuria  Genitourinary: Negative for dysuria, frequency, hematuria and urgency  Musculoskeletal: Negative for arthralgias, gait problem and joint swelling  Skin: Negative for pallor and rash  Neurological: Negative for dizziness, syncope, speech difficulty, weakness, light-headedness, numbness and headaches  Hematological: Negative for adenopathy  Psychiatric/Behavioral: Negative for behavioral problems, confusion and sleep disturbance  The patient is not nervous/anxious          Video Exam    Vitals:    12/05/22 1033   Weight: 80 3 kg (177 lb)   Height: 5' 4" (1 626 m)       Physical Exam     PATIENT VISUALLY APPEARS WELL IN NO OBVIOUS DISTRESS      I spent 15 minutes directly with the patient during this visit

## 2022-12-05 NOTE — PATIENT INSTRUCTIONS
CONTINUE CURRENT TREATMENT PLAN  REVIEWED MEDICATIONS    WILL NEED TO SET UP AN OPIOID MANAGEMENT VISIT    RV 3M, SOONER PRN

## 2023-01-24 DIAGNOSIS — R10.13 DYSPEPSIA: Primary | ICD-10-CM

## 2023-01-24 RX ORDER — OMEPRAZOLE 40 MG/1
40 CAPSULE, DELAYED RELEASE ORAL DAILY
Qty: 30 CAPSULE | Refills: 1 | Status: SHIPPED | OUTPATIENT
Start: 2023-01-24 | End: 2023-05-30 | Stop reason: ALTCHOICE

## 2023-01-24 RX ORDER — SUCRALFATE 1 G/1
1 TABLET ORAL 4 TIMES DAILY
Qty: 40 TABLET | Refills: 1 | Status: SHIPPED | OUTPATIENT
Start: 2023-01-24 | End: 2023-05-30 | Stop reason: ALTCHOICE

## 2023-02-22 DIAGNOSIS — N94.6 DYSMENORRHEA: ICD-10-CM

## 2023-02-22 RX ORDER — OXYCODONE HYDROCHLORIDE AND ACETAMINOPHEN 5; 325 MG/1; MG/1
1 TABLET ORAL EVERY 6 HOURS PRN
Qty: 30 TABLET | Refills: 0 | Status: SHIPPED | OUTPATIENT
Start: 2023-02-22

## 2023-03-23 DIAGNOSIS — B00.9 HERPES SIMPLEX: ICD-10-CM

## 2023-03-23 RX ORDER — VALACYCLOVIR HYDROCHLORIDE 1 G/1
TABLET, FILM COATED ORAL
Qty: 21 TABLET | Refills: 0 | Status: SHIPPED | OUTPATIENT
Start: 2023-03-23 | End: 2023-03-30

## 2023-03-29 ENCOUNTER — TELEPHONE (OUTPATIENT)
Dept: ENDOCRINOLOGY | Facility: CLINIC | Age: 33
End: 2023-03-29

## 2023-03-29 ENCOUNTER — CONSULT (OUTPATIENT)
Dept: ENDOCRINOLOGY | Facility: CLINIC | Age: 33
End: 2023-03-29

## 2023-03-29 VITALS
WEIGHT: 189 LBS | BODY MASS INDEX: 31.49 KG/M2 | DIASTOLIC BLOOD PRESSURE: 82 MMHG | SYSTOLIC BLOOD PRESSURE: 120 MMHG | HEIGHT: 65 IN | HEART RATE: 91 BPM

## 2023-03-29 DIAGNOSIS — E28.2 PCOS (POLYCYSTIC OVARIAN SYNDROME): ICD-10-CM

## 2023-03-29 DIAGNOSIS — N64.3 GALACTORRHEA IN FEMALE: ICD-10-CM

## 2023-03-29 DIAGNOSIS — E55.9 VITAMIN D DEFICIENCY: ICD-10-CM

## 2023-03-29 DIAGNOSIS — R63.5 WEIGHT GAIN: Primary | ICD-10-CM

## 2023-03-29 DIAGNOSIS — E61.1 IRON DEFICIENCY: ICD-10-CM

## 2023-03-29 DIAGNOSIS — E66.9 OBESITY (BMI 30-39.9): ICD-10-CM

## 2023-03-29 RX ORDER — DEXAMETHASONE 1 MG
1 TABLET ORAL ONCE
Qty: 1 TABLET | Refills: 0 | Status: SHIPPED | OUTPATIENT
Start: 2023-03-29 | End: 2023-03-29

## 2023-03-29 NOTE — ASSESSMENT & PLAN NOTE
"Chief Complaint  planters wart (On foot)    Subjective          A Gala Riojas presents to [facility]  History of Present Illness  Plantar's wart on bottom of her left foot for several mths. She reports this is sore at times and has gotten larger  Objective   Vital Signs:   BP 99/60 (BP Location: Right arm, Patient Position: Sitting, Cuff Size: Pediatric)   Pulse 62   Temp 97.2 °F (36.2 °C) (Temporal)   Resp 18   Ht 156.2 cm (61.5\")   Wt 53.3 kg (117 lb 9.6 oz)   SpO2 99%   BMI 21.86 kg/m²     Physical Exam  Vitals reviewed.   HENT:      Head: Normocephalic.      Right Ear: External ear normal.      Left Ear: External ear normal.   Eyes:      Conjunctiva/sclera: Conjunctivae normal.   Cardiovascular:      Rate and Rhythm: Normal rate.      Pulses: Normal pulses.   Pulmonary:      Effort: Pulmonary effort is normal.   Musculoskeletal:      Comments: 7mm Plantar's wart of left medial foot at arch   Neurological:      Mental Status: She is alert.        Result Review :          Cryotherapy, Skin Lesion    Date/Time: 2/21/2023 7:50 AM  Performed by: Shanelle Escobar APRN  Authorized by: Shanelle Escobar APRN   Local anesthesia used: no    Anesthesia:  Local anesthesia used: no    Sedation:  Patient sedated: no    Patient tolerance: patient tolerated the procedure well with no immediate complications  Comments: Cryotherapy performed today after risk-benefit discussion and consent.  Lesions sprayed with liquid nitrogen for 5 pulsatile sec x 3 rounds.  Patient tolerated well.  Care instructions provided.            Assessment and Plan    Diagnoses and all orders for this visit:    1. Plantar wart of left foot (Primary)  Comments:  discussed options for treatment. cryotherapy today. pt tolerated well. repeat in 3-4 wks. also discussed podiatry if not significantly improved    Other orders  -     Cryotherapy, Skin Lesion              Follow Up   Return in about 4 weeks " She does have hirsutism and history of ovarian cysts on ultrasound in the past   Check total and free testosterone  (around 3/21/2023).  Patient was given instructions and counseling regarding her condition or for health maintenance advice. Please see specific information pulled into the AVS if appropriate.

## 2023-03-29 NOTE — TELEPHONE ENCOUNTER
Patient called that she is having diarrhea and vomiting with taking the Prednisone    Is this normal?

## 2023-03-29 NOTE — ASSESSMENT & PLAN NOTE
The differential diagnosis includes hypothyroidism, Cushing's, inadequate sleep due to restless leg syndrome or sleep apnea  I have ordered a 1 mg dexamethasone suppression test where she will take 1 mg of dexamethasone at sleep time and have cortisol level done the following morning at 8 AM   Check TSH and free T4

## 2023-03-29 NOTE — PROGRESS NOTES
Assessment/Plan:    Weight gain  The differential diagnosis includes hypothyroidism, Cushing's, inadequate sleep due to restless leg syndrome or sleep apnea  I have ordered a 1 mg dexamethasone suppression test where she will take 1 mg of dexamethasone at sleep time and have cortisol level done the following morning at 8 AM   Check TSH and free T4  Vitamin D deficiency  Check 25-hydroxy vitamin D level  Iron deficiency  I suspect this is a big component of her restless leg syndrome and weight gain  Check iron panel  The target ferritin is more than 75  Galactorrhea in female  Check prolactin  PCOS (polycystic ovarian syndrome)  She does have hirsutism and history of ovarian cysts on ultrasound in the past   Check total and free testosterone  Obesity  Once we have the work-up for weight gain, we will be able to better address this  Diagnoses and all orders for this visit:    Weight gain  -     Ambulatory Referral to Endocrinology  -     TSH, 3rd generation Lab Collect; Future  -     T4, free Lab Collect; Future  -     Cortisol Level, AM Specimen Lab Collect; Future  -     dexamethasone (DECADRON) 1 mg tablet; Take 1 tablet (1 mg total) by mouth once for 1 dose  -     TSH, 3rd generation Lab Collect  -     T4, free Lab Collect  -     Cortisol Level, AM Specimen Lab Collect    Galactorrhea in female  -     Prolactin Lab Collect; Future  -     Prolactin Lab Collect    Vitamin D deficiency  -     Vitamin D 25 hydroxy Lab Collect; Future  -     Vitamin D 25 hydroxy Lab Collect    Iron deficiency  -     Iron Panel (Includes Ferritin, Iron Sat%, Iron, and TIBC); Future    PCOS (polycystic ovarian syndrome)  -     Testosterone, free, total Lab Collect; Future  -     Testosterone, free, total Lab Collect    Obesity (BMI 30-39  9)          Subjective:      Patient ID: Carson Verde is a 29 y o  female  82-year-old woman presents for consultation related to weight gain    She states that about 5 years ago, "she had a sudden increase in her weight of about 30 pounds in 1 to 2 months  Since then, she has been unable to lose weight  She does admit to restless leg syndrome, frequent awakenings at night, fatigue and morning headaches  She does have hirsutism and a history of polycystic ovarian syndrome  She complains of sweating a lot  In addition, she has cold intolerance  She has noticed a dorsocervical fat pad  She states that she has had purple striae since her pregnancy  They are lightening up in color  The following portions of the patient's history were reviewed and updated as appropriate: allergies, current medications, past family history, past medical history, past social history, past surgical history and problem list     Review of Systems   Constitutional: Positive for unexpected weight change  Negative for chills and fever  Respiratory: Negative for shortness of breath  Cardiovascular: Negative for chest pain  Gastrointestinal: Negative for constipation, diarrhea, nausea and vomiting  All other systems reviewed and are negative  Objective:      /82   Pulse 91   Ht 5' 5\" (1 651 m)   Wt 85 7 kg (189 lb)   BMI 31 45 kg/m²          Physical Exam  Vitals reviewed  Constitutional:       General: She is not in acute distress  Appearance: She is well-developed  She is not diaphoretic  HENT:      Head: Normocephalic and atraumatic  Mouth/Throat:      Pharynx: No oropharyngeal exudate  Eyes:      General: Lids are normal  No scleral icterus  Right eye: No discharge  Left eye: No discharge  Conjunctiva/sclera: Conjunctivae normal    Neck:      Thyroid: No thyromegaly  Cardiovascular:      Rate and Rhythm: Normal rate and regular rhythm  Heart sounds: Normal heart sounds  No murmur heard  No friction rub  No gallop  Pulmonary:      Effort: Pulmonary effort is normal  No respiratory distress  Breath sounds: Normal breath sounds   No " wheezing  Abdominal:      General: Bowel sounds are normal  There is no distension  Palpations: Abdomen is soft  Tenderness: There is no abdominal tenderness  Comments: She does have purple striae present  Musculoskeletal:         General: No tenderness or deformity  Normal range of motion  Cervical back: Neck supple  Lymphadenopathy:      Head:      Right side of head: No occipital adenopathy  Left side of head: No occipital adenopathy  Upper Body:      Right upper body: No supraclavicular adenopathy  Left upper body: No supraclavicular adenopathy  Skin:     General: Skin is warm  Findings: No erythema or rash  Comments: Hirsutism of the face and abdomen are present  Neurological:      Mental Status: She is alert and oriented to person, place, and time  Cranial Nerves: No cranial nerve deficit

## 2023-03-29 NOTE — ASSESSMENT & PLAN NOTE
I suspect this is a big component of her restless leg syndrome and weight gain  Check iron panel  The target ferritin is more than 75

## 2023-03-31 ENCOUNTER — TELEPHONE (OUTPATIENT)
Dept: ENDOCRINOLOGY | Facility: CLINIC | Age: 33
End: 2023-03-31

## 2023-03-31 LAB
25(OH)D3+25(OH)D2 SERPL-MCNC: 27.2 NG/ML (ref 30–100)
CORTIS AM PEAK SERPL-MCNC: 0.6 UG/DL (ref 6.2–19.4)
FERRITIN SERPL-MCNC: 15 NG/ML (ref 15–150)
IRON SATN MFR SERPL: 13 % (ref 15–55)
IRON SERPL-MCNC: 49 UG/DL (ref 27–159)
PROLACTIN SERPL-MCNC: 6.2 NG/ML (ref 4.8–23.3)
T4 FREE SERPL-MCNC: 1.23 NG/DL (ref 0.82–1.77)
TESTOST FREE SERPL-MCNC: 0.5 PG/ML (ref 0–4.2)
TESTOST SERPL-MCNC: 11 NG/DL (ref 13–71)
TIBC SERPL-MCNC: 392 UG/DL (ref 250–450)
TSH SERPL DL<=0.005 MIU/L-ACNC: 0.97 UIU/ML (ref 0.45–4.5)
UIBC SERPL-MCNC: 343 UG/DL (ref 131–425)

## 2023-04-03 NOTE — RESULT ENCOUNTER NOTE
The thyroid and prolactin levels are normal   There is no evidence of Cushing's since the cortisol level is suppressed with dexamethasone  Testosterone level is low suggesting that the PCOS is well controlled  There is significant iron deficiency present based on a ferritin of 15  The target ferritin is 75  There is is most likely the biggest contributor to weight gain  Would recommend seeing your primary care physician for the management of this  The vitamin D level is slightly low  Would recommend starting over-the-counter vitamin D replacement with 2000 units/day

## 2023-04-05 ENCOUNTER — TELEPHONE (OUTPATIENT)
Dept: FAMILY MEDICINE CLINIC | Facility: CLINIC | Age: 33
End: 2023-04-05

## 2023-04-05 NOTE — TELEPHONE ENCOUNTER
Mita Majano left a message on the clinical voicemail line stating [place]  Mita Majano asked what you recommend? Also she saw they have iron patches  Also please advise if the supplements are ok to take with her other meds and how to take them    [phone]

## 2023-05-25 DIAGNOSIS — B00.9 HERPES SIMPLEX: ICD-10-CM

## 2023-05-25 DIAGNOSIS — N94.6 DYSMENORRHEA: ICD-10-CM

## 2023-05-25 NOTE — TELEPHONE ENCOUNTER
Requested medication(s) are due for refill today: Yes  Patient has already received a courtesy refill: No  Other reason request has been forwarded to provider:  This refill cannot be delegated - med ck appt made for 6/5/23

## 2023-05-26 RX ORDER — VALACYCLOVIR HYDROCHLORIDE 1 G/1
1000 TABLET, FILM COATED ORAL 3 TIMES DAILY
Qty: 21 TABLET | Refills: 2 | Status: SHIPPED | OUTPATIENT
Start: 2023-05-26 | End: 2023-06-02

## 2023-05-26 RX ORDER — OXYCODONE HYDROCHLORIDE AND ACETAMINOPHEN 5; 325 MG/1; MG/1
1 TABLET ORAL EVERY 6 HOURS PRN
Qty: 30 TABLET | Refills: 0 | Status: SHIPPED | OUTPATIENT
Start: 2023-05-26

## 2023-05-29 DIAGNOSIS — D64.9 ANEMIA, UNSPECIFIED TYPE: Primary | ICD-10-CM

## 2023-05-30 ENCOUNTER — OFFICE VISIT (OUTPATIENT)
Dept: ENDOCRINOLOGY | Facility: CLINIC | Age: 33
End: 2023-05-30

## 2023-05-30 VITALS
HEIGHT: 65 IN | WEIGHT: 185 LBS | HEART RATE: 87 BPM | SYSTOLIC BLOOD PRESSURE: 130 MMHG | BODY MASS INDEX: 30.82 KG/M2 | DIASTOLIC BLOOD PRESSURE: 86 MMHG

## 2023-05-30 DIAGNOSIS — E55.9 VITAMIN D DEFICIENCY: ICD-10-CM

## 2023-05-30 DIAGNOSIS — E61.1 IRON DEFICIENCY: Primary | ICD-10-CM

## 2023-05-30 DIAGNOSIS — R63.5 WEIGHT GAIN: ICD-10-CM

## 2023-05-30 PROBLEM — E16.1 REACTIVE HYPOGLYCEMIA: Status: RESOLVED | Noted: 2022-08-03 | Resolved: 2023-05-30

## 2023-05-30 NOTE — ASSESSMENT & PLAN NOTE
Discussed the normal thyroid, prolactin, and screening for hypercortisolism  PCOS well controlled with low testosterone levels  Has iron deficiency on supplementation, this is followed primarily by PCP  Has symptoms of polyphagia  Reviewed options for medical fitness and weight management including nutrition  She will consider these options  Follow up as needed

## 2023-05-30 NOTE — PROGRESS NOTES
Established Patient Progress Note       CC: Vitamin D deficiency  Iron deficiency       Impression & Plan:    Problem List Items Addressed This Visit        Other    Weight gain     Discussed the normal thyroid, prolactin, and screening for hypercortisolism  PCOS well controlled with low testosterone levels  Has iron deficiency on supplementation, this is followed primarily by PCP  Has symptoms of polyphagia  Reviewed options for medical fitness and weight management including nutrition  She will consider these options  Follow up as needed  Vitamin D deficiency     Recommended trying another vitamin D supplement preparation  Iron deficiency - Primary     Started on iron supplementation  Primary care rechecking CBC and iron panel  History of Present Illness:     Rodger Calvin is a 29 y o  female seen by our office March 2023 for initial consultation for weight gain  Reported about 5 years ago experienced sudden 30 lb weight gain over 1-2 months and since has been unable to lose weight  Work up included normal thyroid and prolactin levels and appropriate cortisol level suppression with dexamethasone  Testosterone low, PCOS well controlled (history of hirsutism and ovarian cysts on previous ultrasounds)  Aki Carrington has history of polyphagia  Aki Carrington was found to have significant iron deficiency with ferritin of 15 and mild vitamin D deficiency  She has been taking vitamin d supplementation  She is currently off of vitamin D supplementation due to GI upset, she states she stopped vitamin D supplementation with resolution of symptoms and symptoms presented again when she restarted supplementation        Patient Active Problem List   Diagnosis   • Allergic rhinitis   • Anxiety   • Chronic bilateral thoracic back pain   • Raynauds syndrome   • Uterus, adenomyosis   • Migraine without status migrainosus, not intractable   • Chronic nonintractable headache   • Dyspepsia   • Sciatica of left side   • Fatigue   • Arthralgia   • Galactorrhea in female   • Weight gain   • Vitamin D deficiency   • Iron deficiency   • PCOS (polycystic ovarian syndrome)      Past Medical History:   Diagnosis Date   • Atopic dermatitis     Resolved: 2016   • Bunion of great toe of right foot     Last Assessed: 2015   • Chronic sinusitis     Resolved: [id number]   • Herpes labialis     Resolved: [id number]   • Kidney stone    • Ovarian cyst    • Peptic ulcer       Past Surgical History:   Procedure Laterality Date   •  SECTION  2015   •  SECTION  10/20/2017   • SINUS SURGERY     • WISDOM TOOTH EXTRACTION        Family History   Problem Relation Age of Onset   • Asthma Mother    • Hyperlipidemia Father    • Hypertension Father    • Stroke Maternal Grandmother    • Hypertension Maternal Grandmother    • Breast cancer Paternal Grandmother    • Breast cancer Paternal Aunt    • Substance Abuse Neg Hx    • Mental illness Neg Hx      Social History     Tobacco Use   • Smoking status: Never   • Smokeless tobacco: Never   Substance Use Topics   • Alcohol use: Yes     Comment: occasional     Allergies   Allergen Reactions   • Elavil [Amitriptyline] Drowsiness   • Prednisone GI Intolerance   • Tramadol Itching   • Vicodin [Hydrocodone-Acetaminophen] Itching   • Latex Hives       Current Outpatient Medications:   •  aspirin-acetaminophen-caffeine (EXCEDRIN MIGRAINE) 250-250-65 MG per tablet, Take 1 tablet by mouth as needed for headaches, Disp: , Rfl:   •  famotidine (PEPCID) 40 MG tablet, TAKE 1 TABLET(40 MG) BY MOUTH DAILY, Disp: 90 tablet, Rfl: 0  •  fluticasone (FLONASE) 50 mcg/act nasal spray, 2 sprays into each nostril as needed , Disp: , Rfl: 0  •  oxyCODONE-acetaminophen (PERCOCET) 5-325 mg per tablet, Take 1 tablet by mouth every 6 (six) hours as needed for moderate pain Max Daily Amount: 4 tablets, Disp: 30 tablet, Rfl: 0  •  tretinoin (RETIN-A) 0 01 % gel, Apply topically , Disp: , Rfl:   • "valACYclovir (VALTREX) 1,000 mg tablet, Take 1 tablet (1,000 mg total) by mouth 3 (three) times a day for 7 days, Disp: 21 tablet, Rfl: 2    Review of Systems   Constitutional: Negative for activity change, appetite change, +fatigue and + unexpected weight change  HENT: Negative for sore throat, trouble swallowing and voice change  Eyes: Negative for visual disturbance  Respiratory: Negative for shortness of breath  Cardiovascular: Negative for chest pain and palpitations  Gastrointestinal: Negative for abdominal distention, abdominal pain, constipation, diarrhea and vomiting  Endocrine: Negative for cold intolerance, heat intolerance  Musculoskeletal: Negative for arthralgias and back pain  Neurological: Negative for seizures and syncope  All other systems reviewed and are negative  Physical Exam:  Body mass index is 30 79 kg/m²  /86   Pulse 87   Ht 5' 5\" (1 651 m)   Wt 83 9 kg (185 lb)   BMI 30 79 kg/m²    Wt Readings from Last 3 Encounters:   05/30/23 83 9 kg (185 lb)   03/29/23 85 7 kg (189 lb)   12/05/22 80 3 kg (177 lb)     Physical Exam  Vitals reviewed  Constitutional:       Appearance: Normal appearance  Has obesity  Cardiovascular:      Rate and Rhythm: Normal rate and regular rhythm  Pulses: Normal pulses  Heart sounds: Normal heart sounds  Pulmonary:      Effort: Pulmonary effort is normal       Breath sounds: Normal breath sounds  Skin:     General: Skin is warm and dry  Capillary Refill: Capillary refill takes less than 2 seconds  Neurological:      General: No focal deficit present  Mental Status: She is alert and oriented to person, place, and time     Psychiatric:         Mood and Affect: Mood normal          Behavior: Behavior normal      Labs:     Lab Results   Component Value Date    BUN 13 09/16/2022     09/16/2022    CO2 20 09/16/2022    CREATININE 0 72 09/16/2022    CREATININE 0 68 01/14/2022    CREATININE 0 68 12/16/2021    K " 4 0 09/16/2022     02/22/2016     eGFR   Date Value Ref Range Status   09/16/2022 116 >59 mL/min/1 73 Final       Lab Results   Component Value Date    CHOL 158 02/22/2016    CHOLHDL 4 4 09/16/2022    HDL 38 (L) 09/16/2022    TRIG 137 09/16/2022       Lab Results   Component Value Date    ALKPHOS 55 12/11/2018    ALT 17 09/16/2022    AST 19 09/16/2022    BILITOT 0 4 02/22/2016       Lab Results   Component Value Date    FREET4 1 23 03/30/2023     Discussed with the patient and all questioned fully answered  She will call me if any problems arise  Follow-up appointment as needed       Counseled patient on diagnostic results, prognosis, risk and benefit of treatment options, instruction for management, importance of treatment compliance, Risk  factor reduction and impressions    SHAUNA De La Vega

## 2023-06-03 LAB
BASOPHILS # BLD AUTO: 0.1 X10E3/UL (ref 0–0.2)
BASOPHILS NFR BLD AUTO: 1 %
EOSINOPHIL # BLD AUTO: 0.1 X10E3/UL (ref 0–0.4)
EOSINOPHIL NFR BLD AUTO: 1 %
ERYTHROCYTE [DISTWIDTH] IN BLOOD BY AUTOMATED COUNT: 13.8 % (ref 11.7–15.4)
FERRITIN SERPL-MCNC: 31 NG/ML (ref 15–150)
HCT VFR BLD AUTO: 40.4 % (ref 34–46.6)
HGB BLD-MCNC: 13.3 G/DL (ref 11.1–15.9)
IMM GRANULOCYTES # BLD: 0 X10E3/UL (ref 0–0.1)
IMM GRANULOCYTES NFR BLD: 0 %
IRON SERPL-MCNC: 59 UG/DL (ref 27–159)
LYMPHOCYTES # BLD AUTO: 2 X10E3/UL (ref 0.7–3.1)
LYMPHOCYTES NFR BLD AUTO: 31 %
MCH RBC QN AUTO: 26.5 PG (ref 26.6–33)
MCHC RBC AUTO-ENTMCNC: 32.9 G/DL (ref 31.5–35.7)
MCV RBC AUTO: 81 FL (ref 79–97)
MONOCYTES # BLD AUTO: 0.4 X10E3/UL (ref 0.1–0.9)
MONOCYTES NFR BLD AUTO: 6 %
NEUTROPHILS # BLD AUTO: 3.9 X10E3/UL (ref 1.4–7)
NEUTROPHILS NFR BLD AUTO: 61 %
PLATELET # BLD AUTO: 317 X10E3/UL (ref 150–450)
RBC # BLD AUTO: 5.01 X10E6/UL (ref 3.77–5.28)
WBC # BLD AUTO: 6.4 X10E3/UL (ref 3.4–10.8)

## 2023-06-05 ENCOUNTER — TELEMEDICINE (OUTPATIENT)
Dept: FAMILY MEDICINE CLINIC | Facility: CLINIC | Age: 33
End: 2023-06-05
Payer: COMMERCIAL

## 2023-06-05 VITALS — BODY MASS INDEX: 30.82 KG/M2 | WEIGHT: 185 LBS | HEIGHT: 65 IN

## 2023-06-05 DIAGNOSIS — E61.1 IRON DEFICIENCY: Primary | ICD-10-CM

## 2023-06-05 DIAGNOSIS — R53.83 OTHER FATIGUE: ICD-10-CM

## 2023-06-05 PROCEDURE — 99213 OFFICE O/P EST LOW 20 MIN: CPT | Performed by: FAMILY MEDICINE

## 2023-06-10 NOTE — PROGRESS NOTES
Virtual Regular Visit    Verification of patient location:    Patient is located at Home in the following state in which I hold an active license [place]      Assessment/Plan:    Problem List Items Addressed This Visit        Other    Fatigue    Iron deficiency - Primary       BMI Counseling: Body mass index is 30 79 kg/m²  The BMI is above normal  Nutrition recommendations include encouraging healthy choices of fruits and vegetables and moderation in carbohydrate intake  Exercise recommendations include exercising 3-5 times per week  No pharmacotherapy was ordered  Rationale for BMI follow-up plan is due to patient being overweight or obese  Reason for visit is   Chief Complaint   Patient presents with   • Medication Management     Pt here for med check for refill   • Virtual Regular Visit        Encounter provider Xin Carver MD    Provider located at [address] [place] [id number]      Recent Visits  Date Type Provider Dept   06/05/23 [address], MD [facility]   Showing recent visits within past 7 days and meeting all other requirements  Future Appointments  No visits were found meeting these conditions  Showing future appointments within next 150 days and meeting all other requirements       The patient was identified by name and date of birth  Jeff Harrington was informed that this is a telemedicine visit and that the visit is being conducted through the [address] [place] [address]  She agrees to proceed     My office door was closed  No one else was in the room  She acknowledged consent and understanding of privacy and security of the video platform  The patient has agreed to participate and understands they can discontinue the visit at any time  Patient is aware this is a billable service  Subjective  Jeff Harrington is a 29 y o  female            DISCUSSION    REVIEWED RECENT BW  APPEARS IRON DEF AND ANEMIA HAS RESOLVED  CONTINUE TO FEEL FATIGUED, ACHY AT TIMES  REVIEWED SYMPTOMS  WILL BE FOLLOWING UP WITH ENDO  RECOMMEND OV TO INVESTIGATE FURTHER       Past Medical History:   Diagnosis Date   • Atopic dermatitis     Resolved: 2016   • Bunion of great toe of right foot     Last Assessed: 2015   • Chronic sinusitis     Resolved: [id number]   • Herpes labialis     Resolved: [id number]   • Kidney stone    • Ovarian cyst    • Peptic ulcer        Past Surgical History:   Procedure Laterality Date   •  SECTION  2015   •  SECTION  10/20/2017   • SINUS SURGERY     • WISDOM TOOTH EXTRACTION         Current Outpatient Medications   Medication Sig Dispense Refill   • aspirin-acetaminophen-caffeine (EXCEDRIN MIGRAINE) 250-250-65 MG per tablet Take 1 tablet by mouth as needed for headaches     • famotidine (PEPCID) 40 MG tablet TAKE 1 TABLET(40 MG) BY MOUTH DAILY 90 tablet 0   • fluticasone (FLONASE) 50 mcg/act nasal spray 2 sprays into each nostril as needed   0   • oxyCODONE-acetaminophen (PERCOCET) 5-325 mg per tablet Take 1 tablet by mouth every 6 (six) hours as needed for moderate pain Max Daily Amount: 4 tablets 30 tablet 0   • tretinoin (RETIN-A) 0 01 % gel Apply topically      • valACYclovir (VALTREX) 1,000 mg tablet Take 1 tablet (1,000 mg total) by mouth 3 (three) times a day for 7 days 21 tablet 2     No current facility-administered medications for this visit  Allergies   Allergen Reactions   • Elavil [Amitriptyline] Drowsiness   • Prednisone GI Intolerance   • Tramadol Itching   • Vicodin [Hydrocodone-Acetaminophen] Itching   • Latex Hives       Review of Systems   Constitutional: Positive for fatigue  Negative for chills and fever  HENT: Negative for congestion, ear discharge, ear pain, mouth sores, postnasal drip, sore throat and trouble swallowing  Eyes: Negative for pain, discharge and visual disturbance     Respiratory: Negative for cough, shortness of breath "and wheezing  Cardiovascular: Negative for chest pain, palpitations and leg swelling  Gastrointestinal: Negative for abdominal distention, abdominal pain, blood in stool, diarrhea and nausea  Endocrine: Negative for polydipsia, polyphagia and polyuria  Genitourinary: Negative for dysuria, frequency, hematuria and urgency  Musculoskeletal: Positive for arthralgias  Negative for gait problem and joint swelling  Skin: Negative for pallor and rash  Neurological: Negative for dizziness, syncope, speech difficulty, weakness, light-headedness, numbness and headaches  Hematological: Negative for adenopathy  Psychiatric/Behavioral: Negative for behavioral problems, confusion and sleep disturbance  The patient is not nervous/anxious          Video Exam    Vitals:    06/05/23 1142   Weight: 83 9 kg (185 lb)   Height: 5' 5\" (1 651 m)       Physical Exam     PATIENT VISUALLY APPEARS WELL IN NO OBVIOUS DISTRESS      Visit Time  Total Visit Duration: 15        "

## 2023-08-21 ENCOUNTER — OFFICE VISIT (OUTPATIENT)
Dept: FAMILY MEDICINE CLINIC | Facility: CLINIC | Age: 33
End: 2023-08-21
Payer: COMMERCIAL

## 2023-08-21 VITALS
OXYGEN SATURATION: 99 % | BODY MASS INDEX: 32.32 KG/M2 | DIASTOLIC BLOOD PRESSURE: 86 MMHG | RESPIRATION RATE: 12 BRPM | SYSTOLIC BLOOD PRESSURE: 122 MMHG | HEART RATE: 88 BPM | HEIGHT: 65 IN | WEIGHT: 194 LBS | TEMPERATURE: 97.8 F

## 2023-08-21 DIAGNOSIS — R10.2 PELVIC PAIN: ICD-10-CM

## 2023-08-21 DIAGNOSIS — R32 URINARY INCONTINENCE, UNSPECIFIED TYPE: Primary | ICD-10-CM

## 2023-08-21 LAB
SL AMB  POCT GLUCOSE, UA: NORMAL
SL AMB LEUKOCYTE ESTERASE,UA: ABNORMAL
SL AMB POCT BILIRUBIN,UA: ABNORMAL
SL AMB POCT BLOOD,UA: 50
SL AMB POCT CLARITY,UA: CLEAR
SL AMB POCT COLOR,UA: YELLOW
SL AMB POCT KETONES,UA: ABNORMAL
SL AMB POCT NITRITE,UA: ABNORMAL
SL AMB POCT PH,UA: 8
SL AMB POCT SPECIFIC GRAVITY,UA: 1.01
SL AMB POCT URINE PROTEIN: ABNORMAL
SL AMB POCT UROBILINOGEN: NORMAL

## 2023-08-21 PROCEDURE — 99213 OFFICE O/P EST LOW 20 MIN: CPT | Performed by: FAMILY MEDICINE

## 2023-08-21 PROCEDURE — 81003 URINALYSIS AUTO W/O SCOPE: CPT | Performed by: FAMILY MEDICINE

## 2023-08-21 RX ORDER — TOLTERODINE 2 MG/1
2 CAPSULE, EXTENDED RELEASE ORAL
Qty: 30 CAPSULE | Refills: 1 | Status: SHIPPED | OUTPATIENT
Start: 2023-08-21

## 2023-08-21 NOTE — PROGRESS NOTES
Name: Leslye Melendez      : 1990      MRN: [id number]  Encounter Provider: Gabriella Velazquez MD  Encounter Date: 2023   Encounter department: [facility]     1. Urinary incontinence, unspecified type  -     POCT urine dip auto non-scope  -     Urine culture  -     US pelvis complete non OB; Future; Expected date: 2023  -     tolterodine (DETROL LA) 2 mg 24 hr capsule; Take 1 capsule (2 mg total) by mouth daily at bedtime    2. Pelvic pain  -     POCT urine dip auto non-scope  -     Urine culture  -     US pelvis complete non OB; Future; Expected date: 2023  -     tolterodine (DETROL LA) 2 mg 24 hr capsule; Take 1 capsule (2 mg total) by mouth daily at bedtime           Subjective      PATIENT COMPLAINS OF WORSENING URINARY LEAKAGE OVER THE PAST SEVERAL MONTHS  DENIES ANY DYSURIA OR BLOOD  DOES NOT SOME FREQ AND PELVIC PRESSURE OFTEN  NO FEVER OR CHILLS  NO NVD    Review of Systems   Constitutional: Negative for chills, fatigue and fever. HENT: Negative for congestion, ear discharge, ear pain, mouth sores, postnasal drip, sore throat and trouble swallowing. Eyes: Negative for pain, discharge and visual disturbance. Respiratory: Negative for cough, shortness of breath and wheezing. Cardiovascular: Negative for chest pain, palpitations and leg swelling. Gastrointestinal: Negative for abdominal distention, abdominal pain, blood in stool, diarrhea, nausea and vomiting. Endocrine: Negative for polydipsia, polyphagia and polyuria. Genitourinary: Positive for frequency, pelvic pain and urgency. Negative for dysuria and hematuria. Musculoskeletal: Negative for arthralgias, gait problem and joint swelling. Skin: Negative for pallor and rash. Neurological: Negative for dizziness, syncope, speech difficulty, weakness, light-headedness, numbness and headaches. Hematological: Negative for adenopathy.    Psychiatric/Behavioral: Negative for behavioral problems, confusion and sleep disturbance. The patient is not nervous/anxious. Current Outpatient Medications on File Prior to Visit   Medication Sig   • aspirin-acetaminophen-caffeine (EXCEDRIN MIGRAINE) 250-250-65 MG per tablet Take 1 tablet by mouth as needed for headaches   • Cyanocobalamin (VITAMIN B 12 PO) Take by mouth in the morning   • famotidine (PEPCID) 40 MG tablet TAKE 1 TABLET(40 MG) BY MOUTH DAILY   • fluticasone (FLONASE) 50 mcg/act nasal spray 2 sprays into each nostril as needed    • oxyCODONE-acetaminophen (PERCOCET) 5-325 mg per tablet Take 1 tablet by mouth every 6 (six) hours as needed for moderate pain Max Daily Amount: 4 tablets   • tretinoin (RETIN-A) 0.01 % gel Apply topically    • valACYclovir (VALTREX) 1,000 mg tablet Take 1 tablet (1,000 mg total) by mouth 3 (three) times a day for 7 days       Objective     /86 (BP Location: Right arm, Patient Position: Sitting, Cuff Size: Large)   Pulse 88   Temp 97.8 °F (36.6 °C) (Temporal)   Resp 12   Ht 5' 5" (1.651 m)   Wt 88 kg (194 lb)   LMP 07/22/2023 (Exact Date)   SpO2 99%   BMI 32.28 kg/m²     Physical Exam  Constitutional:       Appearance: She is well-developed. HENT:      Head: Normocephalic and atraumatic. Eyes:      General:         Right eye: No discharge. Left eye: No discharge. Conjunctiva/sclera: Conjunctivae normal.      Pupils: Pupils are equal, round, and reactive to light. Neck:      Thyroid: No thyromegaly. Cardiovascular:      Rate and Rhythm: Normal rate and regular rhythm. Heart sounds: Normal heart sounds. No murmur heard. Pulmonary:      Effort: Pulmonary effort is normal. No respiratory distress. Breath sounds: Normal breath sounds. No wheezing or rales. Abdominal:      General: Bowel sounds are normal. There is no distension. Palpations: Abdomen is soft. There is no mass. Tenderness: There is abdominal tenderness.  There is no right CVA tenderness, left CVA tenderness, guarding or rebound. Comments: MILD SUPRAPUBIC TENDERNESS   Musculoskeletal:         General: No tenderness. Normal range of motion. Cervical back: Normal range of motion and neck supple. Lymphadenopathy:      Cervical: No cervical adenopathy. Skin:     General: Skin is warm and dry. Findings: No erythema or rash. Neurological:      Mental Status: She is alert and oriented to person, place, and time. Psychiatric:         Behavior: Behavior normal.         Thought Content:  Thought content normal.         Judgment: Judgment normal.       Robel Richard MD

## 2023-08-21 NOTE — PATIENT INSTRUCTIONS
PLENTY OF FLUIDS - HYDRATION  PELVIC US  TRIAL OF DETROL  UC    MAY NEED UROGYN CONSULT    FURTHER PLANS PENDING RESULTS AND RESPONSE TO MEDICATION

## 2023-08-22 LAB
BACTERIA UR CULT: NORMAL
Lab: NO GROWTH

## 2023-08-23 ENCOUNTER — TELEPHONE (OUTPATIENT)
Dept: FAMILY MEDICINE CLINIC | Facility: CLINIC | Age: 33
End: 2023-08-23

## 2023-08-23 NOTE — TELEPHONE ENCOUNTER
The tailbone pain that she mentioned yesterday is not going away. Feels a little worse today. She was in for something different Monday and you ordered an [address] Not sure if that US will  anything to do with her tailbone. She wanted to make sure that you knew that she did go to an urgent care near her and they did an xray of pelvic area. Not sure if that would show anything with her tailbone. She is going to ask them to send us the results.   She is taking motrin and tylenol, but it is not helping    Please advise

## 2023-08-25 DIAGNOSIS — M53.3 SACRAL PAIN: ICD-10-CM

## 2023-08-25 DIAGNOSIS — R10.2 PELVIC PAIN: Primary | ICD-10-CM

## 2023-08-25 DIAGNOSIS — G96.191 TARLOV CYST: ICD-10-CM

## 2023-08-25 DIAGNOSIS — N94.6 DYSMENORRHEA: ICD-10-CM

## 2023-08-25 NOTE — TELEPHONE ENCOUNTER
Spoke to [employer]. She is going to have the MRI at [address] [place] [address]. She had an xray at [facility] ([phone])  Called [facility] and they will fax the xray results to us.

## 2023-08-25 NOTE — TELEPHONE ENCOUNTER
Left a message for Rohini Hollingsworth to call me back.   Need to know where she is going to go for the MRI

## 2023-08-25 NOTE — TELEPHONE ENCOUNTER
PLEASE CALL [facility]    WE WERE NOT ABLE TO GET A COPY OF THE X RAY REPORT    I DID JUST GET THE US REPORT WHICH WAS NORMAL  I AM NOT SURE WHERE THE PAIN IS COMING FROM  I GO BACK TO THAT ABNORMALITY ON THE CTA LAST YEAR WHICH NOTES A SACRAL CYST  I AM THINKING WE SHOULD INVESTIGATE THAT A LITTLE MORE  I PUT AN ORDER IN FOR AN MRI OF THE AREA  WE CAN SEE IF IT GETS APPROVED    THANKS

## 2023-08-26 RX ORDER — OXYCODONE HYDROCHLORIDE AND ACETAMINOPHEN 5; 325 MG/1; MG/1
1 TABLET ORAL EVERY 6 HOURS PRN
Qty: 30 TABLET | Refills: 0 | Status: SHIPPED | OUTPATIENT
Start: 2023-08-26

## 2023-09-06 ENCOUNTER — TELEPHONE (OUTPATIENT)
Dept: FAMILY MEDICINE CLINIC | Facility: CLINIC | Age: 33
End: 2023-09-06

## 2023-09-06 NOTE — TELEPHONE ENCOUNTER
LM for Syl Yarbrough to call me back. I submitted a Pre Authorization for an MRI Pelvis w & w/o contrast.    Syl Yarbrough called last week and asked what test did Dr. Philip Holley want. I told her and informed her that I started a PA. I went on to [facility] and it looks like they Withdrew the PA. Saw that Syl Linnea was in the ER yesterday. LM to see if they did one through the Er.

## 2023-09-13 NOTE — TELEPHONE ENCOUNTER
Left another message for Tiera Ramirez to return my call    LM that she will get a call center and specifically ask for me

## 2023-09-15 NOTE — TELEPHONE ENCOUNTER
I started an MRI PA for Yuni Hernandez that you requested on 8/30. I was checking everyday and noticed that the case was withdrawn    Yuni Hernandez called me sometime after the appt and asked what test did you order and I told her. She stated that another dr was going to order the test.  I informed her that if is the same test, insurance would either reject their PA or discard mine. I checked the status one day and it said withdrawn. I tried to call Yuni Hernandez to see if she had the test done through another provider. I could not get in touch with her. I noticed that she was in the ER on 9/5 for [facility]). She may have had it done in the ER and maybe that is why they withdrew my request    We finally connected today (9/15). She called to see what was happening with the MRI. I informed her of the above. She did not have the MRI in the ER, but they did a CT. (Results are in care everywhere)      Since they withdrew the MRI that I tried to precert, I have to start another request.  Do you still feel she has the need with her getting the CT done?   Please advise

## 2023-09-16 DIAGNOSIS — B37.31 YEAST VAGINITIS: ICD-10-CM

## 2023-09-18 NOTE — TELEPHONE ENCOUNTER
Other reason request has been forwarded to provider:   No protocol attached to medication refill. Please review.

## 2023-09-19 RX ORDER — FLUCONAZOLE 150 MG/1
TABLET ORAL
Qty: 2 TABLET | Refills: 3 | Status: SHIPPED | OUTPATIENT
Start: 2023-09-19 | End: 2023-09-22

## 2023-09-19 NOTE — TELEPHONE ENCOUNTER
Requested medication(s) are due for refill today: Yes  Patient has already received a courtesy refill: No  Other reason request has been forwarded to provider: Medication not assigned to protocol, review manually

## 2023-09-28 ENCOUNTER — TELEPHONE (OUTPATIENT)
Age: 33
End: 2023-09-28

## 2023-09-28 NOTE — TELEPHONE ENCOUNTER
Message sent via [employer] notifying Tristin Correa that [facility] has been trying to reach her. No further action required.

## 2023-10-03 ENCOUNTER — TELEPHONE (OUTPATIENT)
Age: 33
End: 2023-10-03

## 2023-10-03 NOTE — TELEPHONE ENCOUNTER
Pt called for her MRI results. They are in the chart but have not been reviewed yet. Please advise pt.

## 2023-10-09 PROBLEM — M53.3 SACRAL MASS: Status: ACTIVE | Noted: 2023-10-09

## 2023-10-09 PROBLEM — M46.1 SACROILIITIS (HCC): Status: ACTIVE | Noted: 2023-08-30

## 2023-10-09 RX ORDER — NAPROXEN 500 MG/1
TABLET ORAL
COMMUNITY
Start: 2023-08-19

## 2023-10-09 NOTE — PATIENT INSTRUCTIONS
DISCUSSED RECENT SCAN FINDINGS  REVIEWED RHEUM CONSULT    CONSIDER NEUROSURGICAL EVAL    FURTHER PLANS PENDING EVALUATION

## 2023-10-10 ENCOUNTER — TELEMEDICINE (OUTPATIENT)
Dept: FAMILY MEDICINE CLINIC | Facility: CLINIC | Age: 33
End: 2023-10-10
Payer: COMMERCIAL

## 2023-10-10 DIAGNOSIS — G89.29 CHRONIC MIDLINE LOW BACK PAIN WITH SCIATICA, SCIATICA LATERALITY UNSPECIFIED: ICD-10-CM

## 2023-10-10 DIAGNOSIS — M54.40 CHRONIC MIDLINE LOW BACK PAIN WITH SCIATICA, SCIATICA LATERALITY UNSPECIFIED: ICD-10-CM

## 2023-10-10 DIAGNOSIS — M53.3 SACRAL MASS: Primary | ICD-10-CM

## 2023-10-10 PROCEDURE — 99213 OFFICE O/P EST LOW 20 MIN: CPT | Performed by: FAMILY MEDICINE

## 2023-10-10 NOTE — PROGRESS NOTES
Virtual Regular Visit    Verification of patient location:    Patient is located at Home in the following state in which I hold an active license [place]      Assessment/Plan:    Problem List Items Addressed This Visit        Other    Sacral mass - Primary   Other Visit Diagnoses     Chronic midline low back pain with sciatica, sciatica laterality unspecified                   Reason for visit is   Chief Complaint   Patient presents with   • Virtual Regular Visit        Encounter provider Cyn Huntley MD    Provider located at [address] [place] [phone]      Recent Visits  No visits were found meeting these conditions. Showing recent visits within past 7 days and meeting all other requirements  Today's Visits  Date Type Provider Dept   10/10/23 Telemedicine Cyn Huntley MD [facility]   Showing today's visits and meeting all other requirements  Future Appointments  No visits were found meeting these conditions. Showing future appointments within next 150 days and meeting all other requirements       The patient was identified by name and date of birth. Dacia Ortega was informed that this is a telemedicine visit and that the visit is being conducted through the [employer]. She agrees to proceed. .  My office door was closed. No one else was in the room. She acknowledged consent and understanding of privacy and security of the video platform. The patient has agreed to participate and understands they can discontinue the visit at any time. Patient is aware this is a billable service. Subjective  Dacia Ortega is a 29 y.o. female    .       DISCUSSION    REVIEWED MRI REPORT  DISCUSSED FINDINGS OF SACRAL GROWTH    REVIEWED THERAPEUTIC OPTIONS - RECOMMEND NEUROSURGICAL CONSULT    FURTHER PLANS PENDING EVAL       Past Medical History:   Diagnosis Date   • Atopic dermatitis     Resolved: [id number]   • Bunion of great toe of right foot     Last Assessed: [id number]   • Chronic sinusitis     Resolved: [id number]   • Herpes labialis     Resolved: [id number]   • Kidney stone    • Ovarian cyst    • Peptic ulcer        Past Surgical History:   Procedure Laterality Date   •  SECTION  2015   •  SECTION  10/20/2017   • SINUS SURGERY     • WISDOM TOOTH EXTRACTION         Current Outpatient Medications   Medication Sig Dispense Refill   • aspirin-acetaminophen-caffeine (EXCEDRIN MIGRAINE) 250-250-65 MG per tablet Take 1 tablet by mouth as needed for headaches     • Cyanocobalamin (VITAMIN B 12 PO) Take by mouth in the morning     • famotidine (PEPCID) 40 MG tablet TAKE 1 TABLET(40 MG) BY MOUTH DAILY 90 tablet 0   • fluticasone (FLONASE) 50 mcg/act nasal spray 2 sprays into each nostril as needed   0   • naproxen (NAPROSYN) 500 mg tablet TAKE 1 TABLET BY MOUTH TWICE DAILY WITH FOOD AS NEEDED FOR PAIN OR FEVER     • oxyCODONE-acetaminophen (PERCOCET) 5-325 mg per tablet Take 1 tablet by mouth every 6 (six) hours as needed for moderate pain Max Daily Amount: 4 tablets 30 tablet 0   • tolterodine (DETROL LA) 2 mg 24 hr capsule Take 1 capsule (2 mg total) by mouth daily at bedtime 30 capsule 1   • tretinoin (RETIN-A) 0.01 % gel Apply topically      • valACYclovir (VALTREX) 1,000 mg tablet Take 1 tablet (1,000 mg total) by mouth 3 (three) times a day for 7 days 21 tablet 2     No current facility-administered medications for this visit. Allergies   Allergen Reactions   • Elavil [Amitriptyline] Drowsiness   • Prednisone GI Intolerance   • Tramadol Itching   • Vicodin [Hydrocodone-Acetaminophen] Itching   • Latex Hives       Review of Systems   Constitutional: Negative for fever. HENT: Negative for congestion and sore throat. Eyes: Negative for discharge. Respiratory: Negative for chest tightness. Cardiovascular: Negative for chest pain and palpitations.    Gastrointestinal: Negative for abdominal pain, diarrhea, nausea and vomiting. Musculoskeletal: Positive for arthralgias and back pain. Negative for joint swelling. Neurological: Negative for numbness. Psychiatric/Behavioral: The patient is not nervous/anxious. Video Exam    There were no vitals filed for this visit.     Physical Exam     PATIENT VISUALLY APPEARS WELL IN NO OBVIOUS DISTRESS    Visit Time  Total Visit Duration: 42

## 2023-11-22 DIAGNOSIS — Z13.29 SCREENING FOR HYPOTHYROIDISM: ICD-10-CM

## 2023-11-22 DIAGNOSIS — Z13.6 SCREENING FOR HYPERTENSION: ICD-10-CM

## 2023-11-22 DIAGNOSIS — Z00.00 ROUTINE GENERAL MEDICAL EXAMINATION AT A HEALTH CARE FACILITY: Primary | ICD-10-CM

## 2023-11-22 DIAGNOSIS — Z13.220 SCREENING FOR HYPERLIPIDEMIA: ICD-10-CM

## 2023-11-25 DIAGNOSIS — N94.6 DYSMENORRHEA: ICD-10-CM

## 2023-11-25 DIAGNOSIS — R10.13 DYSPEPSIA: ICD-10-CM

## 2023-11-27 RX ORDER — FAMOTIDINE 40 MG/1
TABLET, FILM COATED ORAL
Qty: 90 TABLET | Refills: 0 | Status: SHIPPED | OUTPATIENT
Start: 2023-11-27

## 2023-11-28 DIAGNOSIS — N94.6 DYSMENORRHEA: ICD-10-CM

## 2023-11-28 RX ORDER — OXYCODONE HYDROCHLORIDE AND ACETAMINOPHEN 5; 325 MG/1; MG/1
1 TABLET ORAL EVERY 6 HOURS PRN
Qty: 30 TABLET | Refills: 0 | OUTPATIENT
Start: 2023-11-28

## 2023-11-28 RX ORDER — OXYCODONE HYDROCHLORIDE AND ACETAMINOPHEN 5; 325 MG/1; MG/1
1 TABLET ORAL EVERY 6 HOURS PRN
Qty: 30 TABLET | Refills: 0 | Status: SHIPPED | OUTPATIENT
Start: 2023-11-28

## 2023-11-28 NOTE — TELEPHONE ENCOUNTER
Requested medication(s) are due for refill today: No  Patient has already received a courtesy refill: No  Other reason request has been forwarded to provider: Dominga Chung

## 2023-12-07 LAB
ALBUMIN SERPL-MCNC: 4.2 G/DL (ref 4–5)
ALBUMIN/GLOB SERPL: 1.5 {RATIO} (ref 1.2–2.2)
ALP SERPL-CCNC: 83 IU/L (ref 44–121)
ALT SERPL-CCNC: 34 IU/L (ref 0–32)
AST SERPL-CCNC: 19 IU/L (ref 0–40)
BASOPHILS # BLD AUTO: 0 X10E3/UL (ref 0–0.2)
BASOPHILS NFR BLD AUTO: 0 %
BILIRUB SERPL-MCNC: 0.3 MG/DL (ref 0–1.2)
BUN SERPL-MCNC: 15 MG/DL (ref 6–20)
BUN/CREAT SERPL: 21 (ref 9–23)
CALCIUM SERPL-MCNC: 9.5 MG/DL (ref 8.7–10.2)
CHLORIDE SERPL-SCNC: 103 MMOL/L (ref 96–106)
CHOLEST SERPL-MCNC: 193 MG/DL (ref 100–199)
CHOLEST/HDLC SERPL: 4.5 RATIO (ref 0–4.4)
CO2 SERPL-SCNC: 21 MMOL/L (ref 20–29)
CREAT SERPL-MCNC: 0.7 MG/DL (ref 0.57–1)
EGFR: 119 ML/MIN/1.73
EOSINOPHIL # BLD AUTO: 0.1 X10E3/UL (ref 0–0.4)
EOSINOPHIL NFR BLD AUTO: 1 %
ERYTHROCYTE [DISTWIDTH] IN BLOOD BY AUTOMATED COUNT: 12.9 % (ref 11.7–15.4)
GLOBULIN SER-MCNC: 2.8 G/DL (ref 1.5–4.5)
GLUCOSE SERPL-MCNC: 93 MG/DL (ref 70–99)
HCT VFR BLD AUTO: 40 % (ref 34–46.6)
HDLC SERPL-MCNC: 43 MG/DL
HGB BLD-MCNC: 13.3 G/DL (ref 11.1–15.9)
IMM GRANULOCYTES # BLD: 0.1 X10E3/UL (ref 0–0.1)
IMM GRANULOCYTES NFR BLD: 1 %
LDLC SERPL CALC-MCNC: 121 MG/DL (ref 0–99)
LYMPHOCYTES # BLD AUTO: 2.2 X10E3/UL (ref 0.7–3.1)
LYMPHOCYTES NFR BLD AUTO: 30 %
MCH RBC QN AUTO: 27 PG (ref 26.6–33)
MCHC RBC AUTO-ENTMCNC: 33.3 G/DL (ref 31.5–35.7)
MCV RBC AUTO: 81 FL (ref 79–97)
MONOCYTES # BLD AUTO: 0.6 X10E3/UL (ref 0.1–0.9)
MONOCYTES NFR BLD AUTO: 8 %
NEUTROPHILS # BLD AUTO: 4.4 X10E3/UL (ref 1.4–7)
NEUTROPHILS NFR BLD AUTO: 60 %
PLATELET # BLD AUTO: 306 X10E3/UL (ref 150–450)
POTASSIUM SERPL-SCNC: 4.5 MMOL/L (ref 3.5–5.2)
PROT SERPL-MCNC: 7 G/DL (ref 6–8.5)
RBC # BLD AUTO: 4.93 X10E6/UL (ref 3.77–5.28)
SL AMB VLDL CHOLESTEROL CALC: 29 MG/DL (ref 5–40)
SODIUM SERPL-SCNC: 138 MMOL/L (ref 134–144)
TRIGL SERPL-MCNC: 161 MG/DL (ref 0–149)
TSH SERPL DL<=0.005 MIU/L-ACNC: 2.01 UIU/ML (ref 0.45–4.5)
WBC # BLD AUTO: 7.4 X10E3/UL (ref 3.4–10.8)

## 2023-12-18 NOTE — PATIENT INSTRUCTIONS
DISCUSSED HEALTH ISSUES  HEALTHY DIET AND EXERCISE  BW WILL BE REVIEWED    RECOMMEND CALCIUM 8124-0158 MG DAILY  VITAMIN D3  1000 IU DAILY  RV IN 1 YEAR FOR ANNUAL EXAM, SOONER IF NEEDED    RV 6M    Recent Results (from the past 672 hour(s))   CBC and differential    Collection Time: 12/06/23 10:09 AM   Result Value Ref Range    White Blood Cell Count 7.4 3.4 - 10.8 x10E3/uL    Red Blood Cell Count 4.93 3.77 - 5.28 x10E6/uL    Hemoglobin 13.3 11.1 - 15.9 g/dL    HCT 40.0 34.0 - 46.6 %    MCV 81 79 - 97 fL    MCH 27.0 26.6 - 33.0 pg    MCHC 33.3 31.5 - 35.7 g/dL    RDW 12.9 11.7 - 15.4 %    Platelet Count 306 150 - 450 x10E3/uL    Neutrophils 60 Not Estab. %    Lymphocytes 30 Not Estab. %    Monocytes 8 Not Estab. %    Eosinophils 1 Not Estab. %    Basophils PCT 0 Not Estab. %    Neutrophils (Absolute) 4.4 1.4 - 7.0 x10E3/uL    Lymphocytes (Absolute) 2.2 0.7 - 3.1 x10E3/uL    Monocytes (Absolute) 0.6 0.1 - 0.9 x10E3/uL    Eosinophils (Absolute) 0.1 0.0 - 0.4 x10E3/uL    Basophils ABS 0.0 0.0 - 0.2 x10E3/uL    Immature Granulocytes 1 Not Estab. %    Immature Granulocytes (Absolute) 0.1 0.0 - 0.1 x10E3/uL   Comprehensive metabolic panel    Collection Time: 12/06/23 10:09 AM   Result Value Ref Range    Glucose, Random 93 70 - 99 mg/dL    BUN 15 6 - 20 mg/dL    Creatinine 0.70 0.57 - 1.00 mg/dL    eGFR 119 >59 mL/min/1.73    SL AMB BUN/CREATININE RATIO 21 9 - 23    Sodium 138 134 - 144 mmol/L    Potassium 4.5 3.5 - 5.2 mmol/L    Chloride 103 96 - 106 mmol/L    CO2 21 20 - 29 mmol/L    CALCIUM 9.5 8.7 - 10.2 mg/dL    Protein, Total 7.0 6.0 - 8.5 g/dL    Albumin 4.2 4.0 - 5.0 g/dL    Globulin, Total 2.8 1.5 - 4.5 g/dL    Albumin/Globulin Ratio 1.5 1.2 - 2.2    TOTAL BILIRUBIN 0.3 0.0 - 1.2 mg/dL    Alk Phos Isoenzymes 83 44 - 121 IU/L    AST 19 0 - 40 IU/L    ALT 34 (H) 0 - 32 IU/L   Lipid panel    Collection Time: 12/06/23 10:09 AM   Result Value Ref Range    Cholesterol, Total 193 100 - 199 mg/dL    Triglycerides 161 (H)  0 - 149 mg/dL    HDL 43 >39 mg/dL    VLDL Cholesterol Calculated 29 5 - 40 mg/dL    LDL Calculated 121 (H) 0 - 99 mg/dL    T. Chol/HDL Ratio 4.5 (H) 0.0 - 4.4 ratio   TSH, 3rd generation    Collection Time: 12/06/23 10:09 AM   Result Value Ref Range    TSH 2.010 0.450 - 4.500 uIU/mL

## 2023-12-19 ENCOUNTER — OFFICE VISIT (OUTPATIENT)
Dept: FAMILY MEDICINE CLINIC | Facility: CLINIC | Age: 33
End: 2023-12-19
Payer: COMMERCIAL

## 2023-12-19 VITALS
HEIGHT: 65 IN | BODY MASS INDEX: 32.65 KG/M2 | HEART RATE: 90 BPM | OXYGEN SATURATION: 98 % | TEMPERATURE: 98.5 F | RESPIRATION RATE: 18 BRPM | SYSTOLIC BLOOD PRESSURE: 112 MMHG | DIASTOLIC BLOOD PRESSURE: 74 MMHG | WEIGHT: 196 LBS

## 2023-12-19 DIAGNOSIS — Z00.00 ROUTINE GENERAL MEDICAL EXAMINATION AT A HEALTH CARE FACILITY: Primary | ICD-10-CM

## 2023-12-19 DIAGNOSIS — R51.9 CHRONIC NONINTRACTABLE HEADACHE, UNSPECIFIED HEADACHE TYPE: ICD-10-CM

## 2023-12-19 DIAGNOSIS — M46.1 SACROILIITIS (HCC): ICD-10-CM

## 2023-12-19 DIAGNOSIS — E28.2 PCOS (POLYCYSTIC OVARIAN SYNDROME): ICD-10-CM

## 2023-12-19 DIAGNOSIS — G89.29 CHRONIC NONINTRACTABLE HEADACHE, UNSPECIFIED HEADACHE TYPE: ICD-10-CM

## 2023-12-19 DIAGNOSIS — G43.909 MIGRAINE WITHOUT STATUS MIGRAINOSUS, NOT INTRACTABLE, UNSPECIFIED MIGRAINE TYPE: ICD-10-CM

## 2023-12-19 DIAGNOSIS — I73.00 RAYNAUD'S DISEASE WITHOUT GANGRENE: ICD-10-CM

## 2023-12-19 PROCEDURE — 99395 PREV VISIT EST AGE 18-39: CPT | Performed by: FAMILY MEDICINE

## 2023-12-19 NOTE — PROGRESS NOTES
FAMILY PRACTICE HEALTH MAINTENANCE OFFICE VISIT  [facility] - [facility] PHYSICIANS    NAME: Jeaneth Koenig  AGE: 33 y.o. SEX: female  : 1990     DATE: 2023    Assessment and Plan     Problem List Items Addressed This Visit        Endocrine    PCOS (polycystic ovarian syndrome)       Cardiovascular and Mediastinum    Raynauds syndrome    Migraine without status migrainosus, not intractable       Musculoskeletal and Integument    Sacroiliitis (HCC)       Other    Chronic nonintractable headache   Other Visit Diagnoses     Routine general medical examination at a health care facility    -  Primary               Return in about 6 months (around 2024) for Recheck.        Chief Complaint     Chief Complaint   Patient presents with   • Physical Exam       History of Present Illness     DISCUSSED HEALTH ISSUES  REVIEWED MEDICAL RECORD  NO CONCERNS AT THIS TIME            Well Adult Physical   Patient here for a comprehensive physical exam.      Diet and Physical Activity  Diet: well balanced diet  Weight concerns: Patient has class 1 obesity (BMI 30-34.9)  Exercise: intermittently      Depression Screen  PHQ-2/9 Depression Screening            General Health  Hearing: Normal:  bilateral  Vision: no vision problems  Dental: regular dental visits    Reproductive Health          The following portions of the patient's history were reviewed and updated as appropriate: allergies, current medications, past family history, past medical history, past social history, past surgical history and problem list.    Review of Systems     Review of Systems   Constitutional:  Negative for chills, fatigue and fever.   HENT:  Negative for congestion, ear discharge, ear pain, mouth sores, postnasal drip, sore throat and trouble swallowing.    Eyes:  Negative for pain, discharge and visual disturbance.   Respiratory:  Negative for cough, shortness of breath and wheezing.    Cardiovascular:   Negative for chest pain, palpitations and leg swelling.   Gastrointestinal:  Negative for abdominal distention, abdominal pain, blood in stool, diarrhea and nausea.   Endocrine: Negative for polydipsia, polyphagia and polyuria.   Genitourinary:  Negative for dysuria, frequency, hematuria and urgency.   Musculoskeletal:  Negative for arthralgias, gait problem and joint swelling.   Skin:  Negative for pallor and rash.   Neurological:  Negative for dizziness, syncope, speech difficulty, weakness, light-headedness, numbness and headaches.   Hematological:  Negative for adenopathy.   Psychiatric/Behavioral:  Negative for behavioral problems, confusion and sleep disturbance. The patient is not nervous/anxious.        Past Medical History     Past Medical History:   Diagnosis Date   • Atopic dermatitis     Resolved: [id number]   • Bunion of great toe of right foot     Last Assessed: 2015   • Chronic sinusitis     Resolved: [id number]   • Herpes labialis     Resolved: [id number]   • Kidney stone    • Ovarian cyst    • Peptic ulcer        Past Surgical History     Past Surgical History:   Procedure Laterality Date   •  SECTION  2015   •  SECTION  10/20/2017   • SINUS SURGERY     • WISDOM TOOTH EXTRACTION         Social History     Social History     Socioeconomic History   • Marital status: /Civil Union     Spouse name: None   • Number of children: None   • Years of education: None   • Highest education level: None   Occupational History   • None   Tobacco Use   • Smoking status: Never   • Smokeless tobacco: Never   Vaping Use   • Vaping status: Never Used   Substance and Sexual Activity   • Alcohol use: Yes     Comment: occasional   • Drug use: No   • Sexual activity: Yes     Birth control/protection: None   Other Topics Concern   • None   Social History Narrative    Caffeine use    Cultural backgroun: Non-    Dental care, regularly    Patient ingests cola containing caffeine     "Primary spoken language English    Tea     Social Determinants of Health     Financial Resource Strain: Not on file   Food Insecurity: Not on file   Transportation Needs: Not on file   Physical Activity: Not on file   Stress: Not on file   Social Connections: Not on file   Intimate Partner Violence: Not on file   Housing Stability: Not on file       Family History     Family History   Problem Relation Age of Onset   • Asthma Mother    • Hyperlipidemia Father    • Hypertension Father    • Stroke Maternal Grandmother    • Hypertension Maternal Grandmother    • Breast cancer Paternal Grandmother    • Breast cancer Paternal Aunt    • Substance Abuse Neg Hx    • Mental illness Neg Hx        Current Medications       Current Outpatient Medications:   •  famotidine (PEPCID) 40 MG tablet, TAKE 1 TABLET(40 MG) BY MOUTH DAILY, Disp: 90 tablet, Rfl: 0  •  fluticasone (FLONASE) 50 mcg/act nasal spray, 2 sprays into each nostril as needed , Disp: , Rfl: 0  •  oxyCODONE-acetaminophen (PERCOCET) 5-325 mg per tablet, Take 1 tablet by mouth every 6 (six) hours as needed for moderate pain Max Daily Amount: 4 tablets (Patient taking differently: Take 1 tablet by mouth every 6 (six) hours as needed for moderate pain As needed), Disp: 30 tablet, Rfl: 0  •  tretinoin (RETIN-A) 0.01 % gel, Apply topically , Disp: , Rfl:      Allergies     Allergies   Allergen Reactions   • Elavil [Amitriptyline] Drowsiness   • Prednisone GI Intolerance   • Tramadol Itching   • Vicodin [Hydrocodone-Acetaminophen] Itching   • Latex Hives       Objective     /74 (BP Location: Left arm, Patient Position: Sitting, Cuff Size: Extra-Large)   Pulse 90   Temp 98.5 °F (36.9 °C) (Temporal)   Resp 18   Ht 5' 5\" (1.651 m)   Wt 88.9 kg (196 lb)   LMP 12/15/2023 (Exact Date)   SpO2 98%   BMI 32.62 kg/m²      Physical Exam  Vitals reviewed.   Constitutional:       General: She is not in acute distress.     Appearance: Normal appearance. She is " well-developed. She is not ill-appearing.   HENT:      Head: Normocephalic and atraumatic.      Right Ear: Tympanic membrane and external ear normal.      Left Ear: Tympanic membrane and external ear normal.      Nose: Nose normal.      Mouth/Throat:      Mouth: Mucous membranes are moist.   Eyes:      General:         Right eye: No discharge.         Left eye: No discharge.      Conjunctiva/sclera: Conjunctivae normal.      Pupils: Pupils are equal, round, and reactive to light.   Neck:      Thyroid: No thyromegaly.   Cardiovascular:      Rate and Rhythm: Normal rate and regular rhythm.      Heart sounds: Normal heart sounds. No murmur heard.  Pulmonary:      Effort: Pulmonary effort is normal.      Breath sounds: Normal breath sounds. No wheezing or rales.   Abdominal:      General: Bowel sounds are normal. There is no distension.      Palpations: Abdomen is soft. There is no mass.      Tenderness: There is no abdominal tenderness. There is no guarding or rebound.   Musculoskeletal:         General: No tenderness or deformity. Normal range of motion.      Cervical back: Normal range of motion and neck supple.   Lymphadenopathy:      Cervical: No cervical adenopathy.   Skin:     General: Skin is warm and dry.      Findings: No erythema or rash.   Neurological:      General: No focal deficit present.      Mental Status: She is alert and oriented to person, place, and time.      Cranial Nerves: No cranial nerve deficit.      Sensory: No sensory deficit.      Motor: Weakness present. No abnormal muscle tone.      Coordination: Coordination normal.      Gait: Gait normal.      Deep Tendon Reflexes: Reflexes are normal and symmetric. Reflexes normal.      Comments: LLE WEAKNESS   Psychiatric:         Mood and Affect: Mood normal.         Behavior: Behavior normal.         Thought Content: Thought content normal.         Judgment: Judgment normal.           No results found.    Health Maintenance     Health Maintenance    Topic Date Due   • Hepatitis C Screening  Never done   • Influenza Vaccine (1) 09/01/2023   • COVID-19 Vaccine (4 - 2023-24 season) 09/01/2023   • BMI: Followup Plan  06/05/2024   • Depression Screening  08/21/2024   • BMI: Adult  08/21/2024   • Annual Physical  12/19/2024   • Cervical Cancer Screening  05/26/2025   • DTaP,Tdap,and Td Vaccines (7 - Td or Tdap) 07/28/2026   • HIB Vaccine  Completed   • IPV Vaccine  Completed   • HIV Screening  Addressed   • Pneumococcal Vaccine: Pediatrics (0 to 5 Years) and At-Risk Patients (6 to 64 Years)  Aged Out   • Hepatitis A Vaccine  Aged Out   • Meningococcal ACWY Vaccine  Aged Out   • HPV Vaccine  Aged Out     Immunization History   Administered Date(s) Administered   • COVID-19 PFIZER VACCINE 0.3 ML IM 04/15/2021, 05/06/2021, 01/08/2022   • DTP 02/08/1993, 04/07/1993, 06/28/1993, 06/14/1994, 08/25/1998   • Hib (PRP-OMP) 02/08/1993, 04/07/1993, 12/10/1993   • INFLUENZA 10/22/2018   • IPV 02/08/1993, 04/07/1993, 06/14/1994, 08/25/1998   • Influenza, injectable, quadrivalent, preservative free 0.5 mL 10/22/2018, 11/14/2019, 10/14/2020   • MMR 03/17/1994, 08/25/1998   • Meningococcal, Unknown Serogroups 05/04/2010   • Td (adult), adsorbed 06/21/2007   • Tdap 07/28/2016   • Tuberculin Skin Test 09/09/1993   • Tuberculin Skin Test-PPD Intradermal 12/04/2014       Suman Correia MD  [facility]

## 2023-12-19 NOTE — LETTER
CINTIA MOHAN      Current Outpatient Medications:     oxyCODONE-acetaminophen (PERCOCET) 5-325 mg per tablet, Take 1 tablet by mouth every 6 (six) hours as needed for moderate pain Max Daily Amount: 4 tablets, Disp: 30 tablet, Rfl: 0    aspirin-acetaminophen-caffeine (EXCEDRIN MIGRAINE) 250-250-65 MG per tablet, Take 1 tablet by mouth as needed for headaches, Disp: , Rfl:     Cyanocobalamin (VITAMIN B 12 PO), Take by mouth in the morning, Disp: , Rfl:     famotidine (PEPCID) 40 MG tablet, TAKE 1 TABLET(40 MG) BY MOUTH DAILY, Disp: 90 tablet, Rfl: 0    fluticasone (FLONASE) 50 mcg/act nasal spray, 2 sprays into each nostril as needed , Disp: , Rfl: 0    naproxen (NAPROSYN) 500 mg tablet, TAKE 1 TABLET BY MOUTH TWICE DAILY WITH FOOD AS NEEDED FOR PAIN OR FEVER, Disp: , Rfl:     tolterodine (DETROL LA) 2 mg 24 hr capsule, Take 1 capsule (2 mg total) by mouth daily at bedtime, Disp: 30 capsule, Rfl: 1    tretinoin (RETIN-A) 0.01 % gel, Apply topically , Disp: , Rfl:     valACYclovir (VALTREX) 1,000 mg tablet, Take 1 tablet (1,000 mg total) by mouth 3 (three) times a day for 7 days, Disp: 21 tablet, Rfl: 2      Recent Results (from the past 672 hour(s))   CBC and differential    Collection Time: 12/06/23 10:09 AM   Result Value Ref Range    White Blood Cell Count 7.4 3.4 - 10.8 x10E3/uL    Red Blood Cell Count 4.93 3.77 - 5.28 x10E6/uL    Hemoglobin 13.3 11.1 - 15.9 g/dL    HCT 40.0 34.0 - 46.6 %    MCV 81 79 - 97 fL    MCH 27.0 26.6 - 33.0 pg    MCHC 33.3 31.5 - 35.7 g/dL    RDW 12.9 11.7 - 15.4 %    Platelet Count 306 150 - 450 x10E3/uL    Neutrophils 60 Not Estab. %    Lymphocytes 30 Not Estab. %    Monocytes 8 Not Estab. %    Eosinophils 1 Not Estab. %    Basophils PCT 0 Not Estab. %    Neutrophils (Absolute) 4.4 1.4 - 7.0 x10E3/uL    Lymphocytes (Absolute) 2.2 0.7 - 3.1 x10E3/uL    Monocytes (Absolute) 0.6 0.1 - 0.9 x10E3/uL    Eosinophils (Absolute) 0.1 0.0 - 0.4 x10E3/uL    Basophils ABS 0.0 0.0 - 0.2 x10E3/uL     Immature Granulocytes 1 Not Estab. %    Immature Granulocytes (Absolute) 0.1 0.0 - 0.1 x10E3/uL   Comprehensive metabolic panel    Collection Time: 12/06/23 10:09 AM   Result Value Ref Range    Glucose, Random 93 70 - 99 mg/dL    BUN 15 6 - 20 mg/dL    Creatinine 0.70 0.57 - 1.00 mg/dL    eGFR 119 >59 mL/min/1.73    SL AMB BUN/CREATININE RATIO 21 9 - 23    Sodium 138 134 - 144 mmol/L    Potassium 4.5 3.5 - 5.2 mmol/L    Chloride 103 96 - 106 mmol/L    CO2 21 20 - 29 mmol/L    CALCIUM 9.5 8.7 - 10.2 mg/dL    Protein, Total 7.0 6.0 - 8.5 g/dL    Albumin 4.2 4.0 - 5.0 g/dL    Globulin, Total 2.8 1.5 - 4.5 g/dL    Albumin/Globulin Ratio 1.5 1.2 - 2.2    TOTAL BILIRUBIN 0.3 0.0 - 1.2 mg/dL    Alk Phos Isoenzymes 83 44 - 121 IU/L    AST 19 0 - 40 IU/L    ALT 34 (H) 0 - 32 IU/L   Lipid panel    Collection Time: 12/06/23 10:09 AM   Result Value Ref Range    Cholesterol, Total 193 100 - 199 mg/dL    Triglycerides 161 (H) 0 - 149 mg/dL    HDL 43 >39 mg/dL    VLDL Cholesterol Calculated 29 5 - 40 mg/dL    LDL Calculated 121 (H) 0 - 99 mg/dL    T. Chol/HDL Ratio 4.5 (H) 0.0 - 4.4 ratio   TSH, 3rd generation    Collection Time: 12/06/23 10:09 AM   Result Value Ref Range    TSH 2.010 0.450 - 4.500 uIU/mL

## 2024-01-09 ENCOUNTER — TELEPHONE (OUTPATIENT)
Age: 34
End: 2024-01-09

## 2024-01-09 DIAGNOSIS — B00.9 HERPES SIMPLEX: ICD-10-CM

## 2024-01-09 DIAGNOSIS — R10.13 DYSPEPSIA: Primary | ICD-10-CM

## 2024-01-09 RX ORDER — VALACYCLOVIR HYDROCHLORIDE 1 G/1
1000 TABLET, FILM COATED ORAL 3 TIMES DAILY
Qty: 21 TABLET | Refills: 4 | Status: SHIPPED | OUTPATIENT
Start: 2024-01-09 | End: 2024-01-16

## 2024-01-09 NOTE — TELEPHONE ENCOUNTER
Patient needs valtrex refilled as she takes it as needed for cold sore. Medication was discontinued and patient is asking to put it back on med list and to please refill. Patient also asking for order for an endoscopy as recommended by Dr. chantal Escalante

## 2024-02-07 ENCOUNTER — OFFICE VISIT (OUTPATIENT)
Dept: GASTROENTEROLOGY | Facility: CLINIC | Age: 34
End: 2024-02-07
Payer: COMMERCIAL

## 2024-02-07 VITALS
OXYGEN SATURATION: 98 % | BODY MASS INDEX: 33.49 KG/M2 | SYSTOLIC BLOOD PRESSURE: 139 MMHG | HEART RATE: 80 BPM | HEIGHT: 65 IN | WEIGHT: 201 LBS | DIASTOLIC BLOOD PRESSURE: 90 MMHG

## 2024-02-07 DIAGNOSIS — R51.9 CHRONIC NONINTRACTABLE HEADACHE, UNSPECIFIED HEADACHE TYPE: ICD-10-CM

## 2024-02-07 DIAGNOSIS — G89.29 CHRONIC NONINTRACTABLE HEADACHE, UNSPECIFIED HEADACHE TYPE: ICD-10-CM

## 2024-02-07 DIAGNOSIS — R74.8 ELEVATED LIVER ENZYMES: ICD-10-CM

## 2024-02-07 DIAGNOSIS — K21.9 GASTROESOPHAGEAL REFLUX DISEASE, UNSPECIFIED WHETHER ESOPHAGITIS PRESENT: Primary | ICD-10-CM

## 2024-02-07 DIAGNOSIS — R10.13 DYSPEPSIA: ICD-10-CM

## 2024-02-07 PROCEDURE — 99204 OFFICE O/P NEW MOD 45 MIN: CPT | Performed by: PHYSICIAN ASSISTANT

## 2024-02-07 RX ORDER — OMEPRAZOLE 40 MG/1
40 CAPSULE, DELAYED RELEASE ORAL
Qty: 90 CAPSULE | Refills: 2 | Status: SHIPPED | OUTPATIENT
Start: 2024-02-07

## 2024-02-07 RX ORDER — NAPHAZOLINE HCL 0.012 %
DROPS OPHTHALMIC (EYE)
COMMUNITY

## 2024-02-07 RX ORDER — ACETYLCARNITINE 500 MG
CAPSULE ORAL
COMMUNITY

## 2024-02-07 NOTE — PROGRESS NOTES
[facility] - Outpatient Consultation  Jeaneth Koenig 33 y.o. female MRN: [id number]  Encounter: [id number]          ASSESSMENT AND PLAN:      Pleasant 33-year-old female with chronic migraines on daily Motrin/Excedrin who presents the office as a new patient for chronic GERD.    1. Chronic GERD  Patient with very longstanding symptoms of reflux for 10 years, worse over the last 4.  She has been managing with Pepcid and Tums daily.  She uses PPI courses occasionally which does not help symptoms completely.  She uses Excedrin daily for headaches as well as Motrin for pain.    -Due to very longstanding history of symptoms, would recommend EGD.  -Discussed the risks of the procedure including bleeding, infection and perforation.  -Patient reports postop nausea and vomiting after anesthesia in the past.  Likely would benefit from antiemetics given prior to procedure.     - Recommend patient start Prilosec 40 mg daily in the morning before breakfast.  - Ideally patient would like to avoid chronic PPI use.  We will follow-up EGD results to see if any need for this indefinitely.    -I suspect patient's significant NSAID use is contributing to symptoms.  She mainly uses Excedrin for migraines.  Previously saw neurologist however reports not being placed on any other medicines at that time.  Will place neurology referral.  -Patient uses Motrin for back pain she was found to have suspected benign lesion on her spine and will be going for surgery for this    -Patient uses oxycodone around her menstrual cycle due to severe pain from endometriosis.  Question possible contribution of gastroparesis leading to severe reflux symptoms as well.  We will continue to monitor this and consider testing if needed.    3. Elevated liver enzymes  Very minimal elevation on last lab work with ALT of 34.  Other liver enzymes normal.  Platelets normal.  Patient denies alcohol use.    -Liver enzymes now.  - If persistent  elevation would recommend right upper quadrant ultrasound and further serological workup.    4. Chronic nonintractable headache, unspecified headache type  Very longstanding history of symptoms for which she takes daily Excedrin.  Reports previously following up with neurologist in the past.    -I suspect patient's significant NSAID use is causing worsening reflux symptoms.  Would recommend follow-up with neurology to discuss possible alternative management        Patient was recommended to follow up after procedure. Patient was recommended to reach out via [employer] with any questions or concerns in the meantime.   ______________________________________________________________________    HPI:      Jeaneth Koenig is a 33 y.o. female with 33-year-old female with history of endometriosis on opioids once a month, chronic headaches with daily Excedrin use who presents the office as a new patient for reflux.    Patient reports very longstanding history of reflux which has worsened over the past 4 years.  She is currently taking Pepcid and Tums few times a day.  She denies any dysphagia.  She reports using PPI courses on and off which do help symptoms however was trying not to be on these for long periods of time.  She does use daily Excedrin as well as daily Motrin use.  Excedrin is for her chronic headaches with Motrin for back pain.  She was found to have a suspected benign tumor on her spine which she is going for biopsy however uses Motrin for this recently.  Denies any nausea or vomiting.    No smoking or alcohol use.    Abdominal surgeries consistent for exploratory laparotomy and  x 2.    No known family history of colon cancer.    She reports having an endoscopy in , unable to review report however suspected gastritis then.  No prior colonoscopy.    REVIEW OF SYSTEMS:    CONSTITUTIONAL: Denies any fever, chills, rigors, and weight loss.  HEENT: No earache or tinnitus. Denies hearing loss or visual  disturbances.  CARDIOVASCULAR: No chest pain or palpitations.   RESPIRATORY: Denies any cough, hemoptysis, shortness of breath or dyspnea on exertion.  GASTROINTESTINAL: As noted in the History of Present Illness.   GENITOURINARY: No problems with urination. Denies any hematuria or dysuria.  NEUROLOGIC: +chronic headaches  MUSCULOSKELETAL: +back pain  SKIN: Denies skin rashes or itching.   ENDOCRINE: Denies excessive thirst. Denies intolerance to heat or cold.  PSYCHOSOCIAL: Denies depression or anxiety. Denies any recent memory loss.       Historical Information   Past Medical History:   Diagnosis Date    Atopic dermatitis     Resolved: [id number]    Bunion of great toe of right foot     Last Assessed: [id number]    Chronic sinusitis     Resolved: [id number]    Herpes labialis     Resolved: [id number]    Kidney stone     Ovarian cyst     Peptic ulcer      Past Surgical History:   Procedure Laterality Date     SECTION  2015     SECTION  10/20/2017    EGD  2014    SINUS SURGERY      WISDOM TOOTH EXTRACTION       Social History   Social History     Substance and Sexual Activity   Alcohol Use Yes    Comment: occasional     Social History     Substance and Sexual Activity   Drug Use No     Social History     Tobacco Use   Smoking Status Never   Smokeless Tobacco Never     Family History   Problem Relation Age of Onset    Asthma Mother     Hyperlipidemia Father     Hypertension Father     Stroke Maternal Grandmother     Hypertension Maternal Grandmother     Breast cancer Paternal Grandmother     Breast cancer Paternal Aunt     Substance Abuse Neg Hx     Mental illness Neg Hx        Meds/Allergies       Current Outpatient Medications:     Acetylcarnitine HCl (Acetyl L-Carnitine) 500 MG CAPS    aspirin-acetaminophen-caffeine (Excedrin Extra Strength) 250-250-65 MG per tablet    famotidine (PEPCID) 40 MG tablet    fluticasone (FLONASE) 50 mcg/act nasal spray    omeprazole (PriLOSEC) 40 MG capsule     "oxyCODONE-acetaminophen (PERCOCET) 5-325 mg per tablet    tretinoin (RETIN-A) 0.01 % gel    valACYclovir (VALTREX) 1,000 mg tablet    Allergies   Allergen Reactions    Elavil [Amitriptyline] Drowsiness    Prednisone GI Intolerance    Tramadol Itching    Vicodin [Hydrocodone-Acetaminophen] Itching    Latex Hives           Objective     Blood pressure 139/90, pulse 80, height 5' 5\" (1.651 m), weight 91.2 kg (201 lb), SpO2 98%. Body mass index is 33.45 kg/m².        PHYSICAL EXAM:      General Appearance:   Alert, cooperative, no distress   HEENT:   Normocephalic, atraumatic, anicteric.     Neck:  Supple, symmetrical, trachea midline   Lungs:   Clear to auscultation bilaterally; no rales, rhonchi or wheezing; respirations unlabored    Heart::   Regular rate and rhythm; no murmur, rub, or gallop.   Abdomen:   Soft, non-tender, non-distended; normal bowel sounds; no masses, no organomegaly. Benign abdomen.    Genitalia:   Deferred    Rectal:   Deferred    Extremities:  No cyanosis, clubbing or edema    Pulses:  2+ and symmetric    Skin:  No jaundice, rashes, or lesions    Lymph nodes:  No palpable cervical lymphadenopathy        Lab Results:   No visits with results within 1 Day(s) from this visit.   Latest known visit with results is:   Orders Only on 11/22/2023   Component Date Value    White Blood Cell Count 12/06/2023 7.4     Red Blood Cell Count 12/06/2023 4.93     Hemoglobin 12/06/2023 13.3     HCT 12/06/2023 40.0     MCV 12/06/2023 81     MCH 12/06/2023 27.0     MCHC 12/06/2023 33.3     RDW 12/06/2023 12.9     Platelet Count 12/06/2023 306     Neutrophils 12/06/2023 60     Lymphocytes 12/06/2023 30     Monocytes 12/06/2023 8     Eosinophils 12/06/2023 1     Basophils PCT 12/06/2023 0     Neutrophils (Absolute) 12/06/2023 4.4     Lymphocytes (Absolute) 12/06/2023 2.2     Monocytes (Absolute) 12/06/2023 0.6     Eosinophils (Absolute) 12/06/2023 0.1     Basophils ABS 12/06/2023 0.0     Immature Granulocytes " 12/06/2023 1     Immature Granulocytes (A* 12/06/2023 0.1     Glucose, Random 12/06/2023 93     BUN 12/06/2023 15     Creatinine 12/06/2023 0.70     eGFR 12/06/2023 119     SL AMB BUN/CREATININE RA* 12/06/2023 21     Sodium 12/06/2023 138     Potassium 12/06/2023 4.5     Chloride 12/06/2023 103     CO2 12/06/2023 21     CALCIUM 12/06/2023 9.5     Protein, Total 12/06/2023 7.0     Albumin 12/06/2023 4.2     Globulin, Total 12/06/2023 2.8     Albumin/Globulin Ratio 12/06/2023 1.5     TOTAL BILIRUBIN 12/06/2023 0.3     Alk Phos Isoenzymes 12/06/2023 83     AST 12/06/2023 19     ALT 12/06/2023 34 (H)     Cholesterol, Total 12/06/2023 193     Triglycerides 12/06/2023 161 (H)     HDL 12/06/2023 43     VLDL Cholesterol Calcula* 12/06/2023 29     LDL Calculated 12/06/2023 121 (H)     T. Chol/HDL Ratio 12/06/2023 4.5 (H)     TSH 12/06/2023 2.010          Radiology Results:   No results found.

## 2024-02-21 ENCOUNTER — ANESTHESIA (OUTPATIENT)
Dept: ANESTHESIOLOGY | Facility: HOSPITAL | Age: 34
End: 2024-02-21

## 2024-02-21 ENCOUNTER — ANESTHESIA EVENT (OUTPATIENT)
Dept: ANESTHESIOLOGY | Facility: HOSPITAL | Age: 34
End: 2024-02-21

## 2024-02-27 DIAGNOSIS — N94.6 DYSMENORRHEA: ICD-10-CM

## 2024-02-27 RX ORDER — OXYCODONE HYDROCHLORIDE AND ACETAMINOPHEN 5; 325 MG/1; MG/1
1 TABLET ORAL EVERY 6 HOURS PRN
Qty: 30 TABLET | Refills: 0 | Status: SHIPPED | OUTPATIENT
Start: 2024-02-27

## 2024-02-29 LAB
ALBUMIN SERPL-MCNC: 4.2 G/DL (ref 3.9–4.9)
ALBUMIN/GLOB SERPL: 1.4 {RATIO} (ref 1.2–2.2)
ALP SERPL-CCNC: 90 IU/L (ref 44–121)
ALT SERPL-CCNC: 19 IU/L (ref 0–32)
AST SERPL-CCNC: 18 IU/L (ref 0–40)
BILIRUB SERPL-MCNC: 0.3 MG/DL (ref 0–1.2)
BUN SERPL-MCNC: 11 MG/DL (ref 6–20)
BUN/CREAT SERPL: 12 (ref 9–23)
CALCIUM SERPL-MCNC: 9.6 MG/DL (ref 8.7–10.2)
CHLORIDE SERPL-SCNC: 103 MMOL/L (ref 96–106)
CO2 SERPL-SCNC: 22 MMOL/L (ref 20–29)
CREAT SERPL-MCNC: 0.92 MG/DL (ref 0.57–1)
EGFR: 85 ML/MIN/1.73
GLOBULIN SER-MCNC: 3 G/DL (ref 1.5–4.5)
GLUCOSE SERPL-MCNC: 87 MG/DL (ref 70–99)
POTASSIUM SERPL-SCNC: 4.2 MMOL/L (ref 3.5–5.2)
PROT SERPL-MCNC: 7.2 G/DL (ref 6–8.5)
SODIUM SERPL-SCNC: 139 MMOL/L (ref 134–144)

## 2024-03-06 ENCOUNTER — ANESTHESIA EVENT (OUTPATIENT)
Dept: GASTROENTEROLOGY | Facility: AMBULARY SURGERY CENTER | Age: 34
End: 2024-03-06

## 2024-03-06 ENCOUNTER — HOSPITAL ENCOUNTER (OUTPATIENT)
Dept: GASTROENTEROLOGY | Facility: AMBULARY SURGERY CENTER | Age: 34
Setting detail: OUTPATIENT SURGERY
Discharge: HOME | End: 2024-03-06
Attending: INTERNAL MEDICINE
Payer: COMMERCIAL

## 2024-03-06 ENCOUNTER — NURSE TRIAGE (OUTPATIENT)
Age: 34
End: 2024-03-06

## 2024-03-06 ENCOUNTER — ANESTHESIA (OUTPATIENT)
Dept: GASTROENTEROLOGY | Facility: AMBULARY SURGERY CENTER | Age: 34
End: 2024-03-06

## 2024-03-06 VITALS
SYSTOLIC BLOOD PRESSURE: 118 MMHG | DIASTOLIC BLOOD PRESSURE: 68 MMHG | HEART RATE: 81 BPM | HEIGHT: 65 IN | OXYGEN SATURATION: 100 % | TEMPERATURE: 99.4 F | RESPIRATION RATE: 16 BRPM | BODY MASS INDEX: 33.49 KG/M2 | WEIGHT: 201 LBS

## 2024-03-06 DIAGNOSIS — K21.9 GASTROESOPHAGEAL REFLUX DISEASE, UNSPECIFIED WHETHER ESOPHAGITIS PRESENT: ICD-10-CM

## 2024-03-06 LAB
EXT PREGNANCY TEST URINE: NEGATIVE
EXT. CONTROL: NORMAL

## 2024-03-06 PROCEDURE — 88305 TISSUE EXAM BY PATHOLOGIST: CPT | Performed by: STUDENT IN AN ORGANIZED HEALTH CARE EDUCATION/TRAINING PROGRAM

## 2024-03-06 PROCEDURE — 43239 EGD BIOPSY SINGLE/MULTIPLE: CPT | Performed by: INTERNAL MEDICINE

## 2024-03-06 PROCEDURE — 81025 URINE PREGNANCY TEST: CPT | Performed by: ANESTHESIOLOGY

## 2024-03-06 RX ORDER — PROPOFOL 10 MG/ML
INJECTION, EMULSION INTRAVENOUS AS NEEDED
Status: DISCONTINUED | OUTPATIENT
Start: 2024-03-06 | End: 2024-03-06

## 2024-03-06 RX ORDER — SODIUM CHLORIDE, SODIUM LACTATE, POTASSIUM CHLORIDE, CALCIUM CHLORIDE 600; 310; 30; 20 MG/100ML; MG/100ML; MG/100ML; MG/100ML
INJECTION, SOLUTION INTRAVENOUS CONTINUOUS PRN
Status: DISCONTINUED | OUTPATIENT
Start: 2024-03-06 | End: 2024-03-06

## 2024-03-06 RX ORDER — LIDOCAINE HYDROCHLORIDE 20 MG/ML
INJECTION, SOLUTION EPIDURAL; INFILTRATION; INTRACAUDAL; PERINEURAL AS NEEDED
Status: DISCONTINUED | OUTPATIENT
Start: 2024-03-06 | End: 2024-03-06

## 2024-03-06 RX ORDER — ONDANSETRON 2 MG/ML
4 INJECTION INTRAMUSCULAR; INTRAVENOUS EVERY 6 HOURS PRN
Status: DISCONTINUED | OUTPATIENT
Start: 2024-03-06 | End: 2024-03-10 | Stop reason: HOSPADM

## 2024-03-06 RX ADMIN — LIDOCAINE HYDROCHLORIDE 100 MG: 20 INJECTION, SOLUTION EPIDURAL; INFILTRATION; INTRACAUDAL; PERINEURAL at 11:10

## 2024-03-06 RX ADMIN — SODIUM CHLORIDE, SODIUM LACTATE, POTASSIUM CHLORIDE, AND CALCIUM CHLORIDE: .6; .31; .03; .02 INJECTION, SOLUTION INTRAVENOUS at 10:55

## 2024-03-06 RX ADMIN — LIDOCAINE HYDROCHLORIDE 50 MG: 20 INJECTION, SOLUTION EPIDURAL; INFILTRATION; INTRACAUDAL; PERINEURAL at 11:14

## 2024-03-06 RX ADMIN — ONDANSETRON 4 MG: 2 INJECTION INTRAMUSCULAR; INTRAVENOUS at 11:52

## 2024-03-06 RX ADMIN — PROPOFOL 200 MG: 10 INJECTION, EMULSION INTRAVENOUS at 11:10

## 2024-03-06 NOTE — ANESTHESIA PREPROCEDURE EVALUATION
Procedure:  EGD    Relevant Problems   CARDIO   (+) Chronic bilateral thoracic back pain   (+) Migraine without status migrainosus, not intractable      MUSCULOSKELETAL   (+) Chronic bilateral thoracic back pain   (+) Sacroiliitis (HCC)   (+) Sciatica of left side      NEURO/PSYCH   (+) Anxiety   (+) Chronic bilateral thoracic back pain   (+) Chronic nonintractable headache   (+) Migraine without status migrainosus, not intractable      BMI 33    Physical Exam    Airway    Mallampati score: I  TM Distance: >3 FB  Neck ROM: full     Dental   No notable dental hx     Cardiovascular      Pulmonary      Other Findings  post-pubertal.      Anesthesia Plan  ASA Score- 2     Anesthesia Type- IV sedation with anesthesia with ASA Monitors.         Additional Monitors:     Airway Plan:            Plan Factors-Exercise tolerance (METS): >4 METS.    Chart reviewed.    Patient summary reviewed.                  Induction- intravenous.    Postoperative Plan-     Informed Consent- Anesthetic plan and risks discussed with patient.  I personally reviewed this patient with the CRNA. Discussed and agreed on the Anesthesia Plan with the CRNA..

## 2024-03-06 NOTE — TELEPHONE ENCOUNTER
"Reason for Disposition   Abdominal cramping and bloating after colonoscopy, questions about    Answer Assessment - Initial Assessment Questions  1. DATE/TIME: EGD today   2. MAIN CONCERN: \"What is your main concern right now?\" \"What questions do you have?\"  Throat irritation and gas bubbles.       Patient aware that this is normal and should call back with any worsen complaints.    Protocols used: Colonoscopy Symptoms and Questions-ADULT-    "

## 2024-03-07 ENCOUNTER — TELEPHONE (OUTPATIENT)
Dept: NEUROLOGY | Facility: CLINIC | Age: 34
End: 2024-03-07

## 2024-03-07 NOTE — TELEPHONE ENCOUNTER
I called to check on patient today. She states that she is still having mild throat irritation and a few gas bubbles but everything has improved since yesterday and she is feeling better.

## 2024-03-08 PROCEDURE — 88305 TISSUE EXAM BY PATHOLOGIST: CPT | Performed by: STUDENT IN AN ORGANIZED HEALTH CARE EDUCATION/TRAINING PROGRAM

## 2024-03-25 DIAGNOSIS — R10.13 DYSPEPSIA: ICD-10-CM

## 2024-03-25 RX ORDER — FAMOTIDINE 40 MG/1
TABLET, FILM COATED ORAL
Qty: 90 TABLET | Refills: 1 | Status: SHIPPED | OUTPATIENT
Start: 2024-03-25

## 2024-04-16 DIAGNOSIS — Z13.6 SCREENING FOR HYPERTENSION: Primary | ICD-10-CM

## 2024-04-16 DIAGNOSIS — Z13.220 SCREENING FOR HYPERLIPIDEMIA: ICD-10-CM

## 2024-05-14 ENCOUNTER — TELEPHONE (OUTPATIENT)
Age: 34
End: 2024-05-14

## 2024-05-14 NOTE — TELEPHONE ENCOUNTER
New Patient called because PCP recommended a Urodynamic test.   I do not see a referral.  I can not schedule this at this time.  Pt req cb [phone]

## 2024-05-15 NOTE — TELEPHONE ENCOUNTER
Patient scheduled to establish care 8/1. PCP thinking she needs UDS, requesting to be waitlisted as she is completely incontinent.    Spontaneous, unlabored and symmetrical

## 2024-05-21 LAB
ALBUMIN SERPL-MCNC: 4.3 G/DL (ref 3.9–4.9)
ALBUMIN/GLOB SERPL: 1.4 {RATIO} (ref 1.2–2.2)
ALP SERPL-CCNC: 98 IU/L (ref 44–121)
ALT SERPL-CCNC: 27 IU/L (ref 0–32)
AST SERPL-CCNC: 27 IU/L (ref 0–40)
BILIRUB SERPL-MCNC: 0.5 MG/DL (ref 0–1.2)
BUN SERPL-MCNC: 11 MG/DL (ref 6–20)
BUN/CREAT SERPL: 10 (ref 9–23)
CALCIUM SERPL-MCNC: 9.4 MG/DL (ref 8.7–10.2)
CHLORIDE SERPL-SCNC: 104 MMOL/L (ref 96–106)
CHOLEST SERPL-MCNC: 200 MG/DL (ref 100–199)
CHOLEST/HDLC SERPL: 4.4 RATIO (ref 0–4.4)
CO2 SERPL-SCNC: 20 MMOL/L (ref 20–29)
CREAT SERPL-MCNC: 1.09 MG/DL (ref 0.57–1)
EGFR: 70 ML/MIN/1.73
GLOBULIN SER-MCNC: 3 G/DL (ref 1.5–4.5)
GLUCOSE SERPL-MCNC: 95 MG/DL (ref 70–99)
HDLC SERPL-MCNC: 45 MG/DL
LDLC SERPL CALC-MCNC: 125 MG/DL (ref 0–99)
POTASSIUM SERPL-SCNC: 4.8 MMOL/L (ref 3.5–5.2)
PROT SERPL-MCNC: 7.3 G/DL (ref 6–8.5)
SL AMB VLDL CHOLESTEROL CALC: 30 MG/DL (ref 5–40)
SODIUM SERPL-SCNC: 139 MMOL/L (ref 134–144)
TRIGL SERPL-MCNC: 166 MG/DL (ref 0–149)

## 2024-05-29 DIAGNOSIS — N94.6 DYSMENORRHEA: ICD-10-CM

## 2024-05-29 DIAGNOSIS — R10.13 DYSPEPSIA: ICD-10-CM

## 2024-05-30 RX ORDER — OXYCODONE HYDROCHLORIDE AND ACETAMINOPHEN 5; 325 MG/1; MG/1
1 TABLET ORAL EVERY 6 HOURS PRN
Qty: 30 TABLET | Refills: 0 | Status: SHIPPED | OUTPATIENT
Start: 2024-05-30

## 2024-05-30 RX ORDER — FAMOTIDINE 40 MG/1
40 TABLET, FILM COATED ORAL DAILY
Qty: 90 TABLET | Refills: 0 | Status: SHIPPED | OUTPATIENT
Start: 2024-05-30

## 2024-06-08 RX ORDER — CRANBERRY FRUIT EXTRACT 650 MG
CAPSULE ORAL
COMMUNITY
Start: 2024-04-05

## 2024-06-10 ENCOUNTER — OFFICE VISIT (OUTPATIENT)
Dept: FAMILY MEDICINE CLINIC | Facility: CLINIC | Age: 34
End: 2024-06-10
Payer: COMMERCIAL

## 2024-06-10 VITALS
DIASTOLIC BLOOD PRESSURE: 86 MMHG | SYSTOLIC BLOOD PRESSURE: 116 MMHG | HEART RATE: 85 BPM | WEIGHT: 202 LBS | OXYGEN SATURATION: 99 % | RESPIRATION RATE: 16 BRPM | TEMPERATURE: 97.5 F | BODY MASS INDEX: 33.66 KG/M2 | HEIGHT: 65 IN

## 2024-06-10 DIAGNOSIS — M53.3 SACRAL MASS: ICD-10-CM

## 2024-06-10 DIAGNOSIS — G43.909 MIGRAINE WITHOUT STATUS MIGRAINOSUS, NOT INTRACTABLE, UNSPECIFIED MIGRAINE TYPE: Primary | ICD-10-CM

## 2024-06-10 DIAGNOSIS — G89.29 CHRONIC BILATERAL THORACIC BACK PAIN: ICD-10-CM

## 2024-06-10 DIAGNOSIS — F41.9 ANXIETY: ICD-10-CM

## 2024-06-10 DIAGNOSIS — M54.6 CHRONIC BILATERAL THORACIC BACK PAIN: ICD-10-CM

## 2024-06-10 PROCEDURE — 99214 OFFICE O/P EST MOD 30 MIN: CPT | Performed by: FAMILY MEDICINE

## 2024-06-10 NOTE — PATIENT INSTRUCTIONS
CONTINUE CURRENT TREATMENT PLAN    RV 6 M FOR CPX    Better healthcare is in your genes. Learn more about a community health research program at [employer]     Who is eligible to join?  You are eligible to participate if you are 18 years or older at the time of enrollment. You are not eligible to participate if you are a recipient of a donor bone marrow or a stem cell transplant.     Is there a cost to participate?  There is no cost to participate and health insurance is not required.    What can I learn about in this study?  You can learn about inherited risks for:                                              Common cancers: hereditary breast and ovarian cancer, and                                colorectal cancer related to Zhou syndrome                              Heart disease: hereditary high cholesterol (also known as                                familial hypercholesterolemia)                             * You also have the opportunity to learn about your ancestry and                                other traits like, caffeine sensitivity, sleep patterns and more    How can I sign up? Go to [email or web address] or scan the QR Code to sign up.

## 2024-06-10 NOTE — PROGRESS NOTES
Ambulatory Visit  Name: Jeaneth Koenig      : 1990      MRN: [id number]  Encounter Provider: Suman Correia MD  Encounter Date: 6/10/2024   Encounter department: [facility]YS[facility]    Assessment & Plan   1. Migraine without status migrainosus, not intractable, unspecified migraine type  Assessment & Plan:  STABLE  PAIN MANAGEMENT ADEQUATE  Orders:  -     Ambulatory referral to Spine & Pain Management; Future  2. Chronic bilateral thoracic back pain  Assessment & Plan:  STABEL  3. Anxiety  4. Sacral mass         History of Present Illness     PATIENT RETURNS FOR ROUTINE EVALUATION OF PATIENT'S MEDICAL ISSUES    INDIVIDUAL MEDICAL ISSUES WITH THEIR CURRENT STATUS, ASSESSMENT AND PLANS ARE LISTED ABOVE            Review of Systems   Constitutional:  Negative for chills, fatigue and fever.   HENT:  Negative for congestion, ear discharge, ear pain, mouth sores, postnasal drip, sore throat and trouble swallowing.    Eyes:  Negative for pain, discharge and visual disturbance.   Respiratory:  Negative for cough, shortness of breath and wheezing.    Cardiovascular:  Negative for chest pain, palpitations and leg swelling.   Gastrointestinal:  Negative for abdominal distention, abdominal pain, blood in stool, diarrhea and nausea.   Endocrine: Negative for polydipsia, polyphagia and polyuria.   Genitourinary:  Negative for dysuria, frequency, hematuria and urgency.   Musculoskeletal:  Positive for arthralgias and back pain. Negative for gait problem and joint swelling.   Skin:  Negative for pallor and rash.   Neurological:  Negative for dizziness, syncope, speech difficulty, weakness, light-headedness, numbness and headaches.   Hematological:  Negative for adenopathy.   Psychiatric/Behavioral:  Negative for behavioral problems, confusion and sleep disturbance. The patient is not nervous/anxious.      Past Medical History:   Diagnosis Date   • Atopic dermatitis     Resolved: 2016   • Bunion of  great toe of right foot     Last Assessed: [id number]   • Chronic sinusitis     Resolved: [id number]   • Herpes labialis     Resolved: [id number]   • Kidney stone    • Ovarian cyst    • Peptic ulcer    • PONV (postoperative nausea and vomiting)      Past Surgical History:   Procedure Laterality Date   •  SECTION  2015   •  SECTION  10/20/2017   • EGD     • SINUS SURGERY     • WISDOM TOOTH EXTRACTION       Family History   Problem Relation Age of Onset   • Asthma Mother    • Hyperlipidemia Father    • Hypertension Father    • Stroke Maternal Grandmother    • Hypertension Maternal Grandmother    • Breast cancer Paternal Grandmother    • Breast cancer Paternal Aunt    • Substance Abuse Neg Hx    • Mental illness Neg Hx      Social History     Tobacco Use   • Smoking status: Never     Passive exposure: Never   • Smokeless tobacco: Never   Vaping Use   • Vaping status: Never Used   Substance and Sexual Activity   • Alcohol use: Yes     Comment: occasional   • Drug use: No   • Sexual activity: Yes     Birth control/protection: None     Current Outpatient Medications on File Prior to Visit   Medication Sig   • Acetylcarnitine HCl (Acetyl L-Carnitine) 500 MG CAPS    • aspirin-acetaminophen-caffeine (Excedrin Extra Strength) 250-250-65 MG per tablet    • famotidine (PEPCID) 40 MG tablet Take 1 tablet (40 mg total) by mouth daily   • fluticasone (FLONASE) 50 mcg/act nasal spray 2 sprays into each nostril as needed    • Inositol-D Chiro-Inositol (Ovasitol) 2000-50 MG PACK    • omeprazole (PriLOSEC) 40 MG capsule Take 1 capsule (40 mg total) by mouth daily before breakfast   • oxyCODONE-acetaminophen (PERCOCET) 5-325 mg per tablet Take 1 tablet by mouth every 6 (six) hours as needed for moderate pain Max Daily Amount: 4 tablets   • tretinoin (RETIN-A) 0.01 % gel Apply topically    • valACYclovir (VALTREX) 1,000 mg tablet Take 1 tablet (1,000 mg total) by mouth 3 (three) times a day for 7 days (Patient  "taking differently: Take 1,000 mg by mouth if needed)     Allergies   Allergen Reactions   • Elavil [Amitriptyline] Drowsiness   • Prednisone GI Intolerance   • Tramadol Itching   • Vicodin [Hydrocodone-Acetaminophen] Itching   • Latex Hives     Immunization History   Administered Date(s) Administered   • COVID-19 PFIZER VACCINE 0.3 ML IM 04/15/2021, 05/06/2021, 01/08/2022   • DTP 02/08/1993, 04/07/1993, 06/28/1993, 06/14/1994, 08/25/1998   • Hib (PRP-OMP) 02/08/1993, 04/07/1993, 12/10/1993   • INFLUENZA 10/22/2018   • IPV 02/08/1993, 04/07/1993, 06/14/1994, 08/25/1998   • Influenza, injectable, quadrivalent, preservative free 0.5 mL 10/22/2018, 11/14/2019, 10/14/2020   • MMR 03/17/1994, 08/25/1998   • Meningococcal, Unknown Serogroups 05/04/2010   • Td (adult), adsorbed 06/21/2007   • Tdap 07/28/2016   • Tuberculin Skin Test 09/09/1993   • Tuberculin Skin Test-PPD Intradermal 12/04/2014     Objective     /86 (BP Location: Left arm, Patient Position: Sitting, Cuff Size: Large)   Pulse 85   Temp 97.5 °F (36.4 °C) (Temporal)   Resp 16   Ht 5' 5\" (1.651 m)   Wt 91.6 kg (202 lb)   LMP 06/08/2024 (Exact Date)   SpO2 99%   BMI 33.61 kg/m²     Physical Exam  Constitutional:       General: She is not in acute distress.     Appearance: Normal appearance. She is well-developed. She is obese. She is not ill-appearing.   HENT:      Head: Normocephalic and atraumatic.   Eyes:      General:         Right eye: No discharge.         Left eye: No discharge.      Conjunctiva/sclera: Conjunctivae normal.      Pupils: Pupils are equal, round, and reactive to light.   Neck:      Thyroid: No thyromegaly.   Cardiovascular:      Rate and Rhythm: Normal rate and regular rhythm.      Heart sounds: Normal heart sounds. No murmur heard.  Pulmonary:      Effort: Pulmonary effort is normal. No respiratory distress.      Breath sounds: Normal breath sounds. No wheezing or rales.   Abdominal:      General: Bowel sounds are normal.    "   Palpations: Abdomen is soft.      Tenderness: There is no abdominal tenderness.   Musculoskeletal:         General: No tenderness. Normal range of motion.      Cervical back: Normal range of motion and neck supple.   Lymphadenopathy:      Cervical: No cervical adenopathy.   Skin:     General: Skin is warm and dry.      Findings: No erythema or rash.   Neurological:      General: No focal deficit present.      Mental Status: She is alert and oriented to person, place, and time.   Psychiatric:         Behavior: Behavior normal.         Thought Content: Thought content normal.         Judgment: Judgment normal.       Administrative Statements

## 2024-06-11 ENCOUNTER — OFFICE VISIT (OUTPATIENT)
Dept: UROLOGY | Facility: CLINIC | Age: 34
End: 2024-06-11
Payer: COMMERCIAL

## 2024-06-11 VITALS
OXYGEN SATURATION: 100 % | HEIGHT: 65 IN | BODY MASS INDEX: 33.82 KG/M2 | WEIGHT: 203 LBS | SYSTOLIC BLOOD PRESSURE: 136 MMHG | HEART RATE: 76 BPM | DIASTOLIC BLOOD PRESSURE: 90 MMHG

## 2024-06-11 DIAGNOSIS — R35.0 URINARY FREQUENCY: Primary | ICD-10-CM

## 2024-06-11 LAB
POST-VOID RESIDUAL VOLUME, ML POC: 102 ML
SL AMB  POCT GLUCOSE, UA: NORMAL
SL AMB LEUKOCYTE ESTERASE,UA: NORMAL
SL AMB POCT BILIRUBIN,UA: NORMAL
SL AMB POCT BLOOD,UA: NORMAL
SL AMB POCT CLARITY,UA: CLEAR
SL AMB POCT COLOR,UA: YELLOW
SL AMB POCT KETONES,UA: NORMAL
SL AMB POCT NITRITE,UA: NORMAL
SL AMB POCT PH,UA: 6
SL AMB POCT SPECIFIC GRAVITY,UA: 1.01
SL AMB POCT URINE PROTEIN: NORMAL
SL AMB POCT UROBILINOGEN: 0.2

## 2024-06-11 PROCEDURE — 51798 US URINE CAPACITY MEASURE: CPT | Performed by: UROLOGY

## 2024-06-11 PROCEDURE — 99204 OFFICE O/P NEW MOD 45 MIN: CPT | Performed by: UROLOGY

## 2024-06-11 PROCEDURE — 81002 URINALYSIS NONAUTO W/O SCOPE: CPT | Performed by: UROLOGY

## 2024-06-11 NOTE — PROGRESS NOTES
Jeaneth Koenig is a(n) 33 y.o. female. , :  1990    Subjective     Assessment:  The encounter diagnosis was Urinary frequency.  Patient referred from the neurosurgeon regarding an S1 schwannoma that he is considering operating on.  My understanding is that he would either debulk the tumor and/or make more of a space for it by opening up the nerve space by removing bone.  Long discussion with the patient and her  about the clinical implications of having nerves to the bladder pressed upon centrally.  I suspect that the reason for requesting urodynamics was to determine that there is some overactivity to the bladder.  She is already evidencing this by being incontinent of 3 pads per day.  MRI and  showed growth of the schwannoma versus 2018.  She has some back pain from it to and is considering surgery.  I reassured her that urodynamics is only going to show that the bladder is overactive.  It will not really be able to isolate the nerve root.  In fact some people have overactive bladder just as a result of the test because you have to insert something vaginally and within the bladder.  In and of themselves those can be irritating to the bladder.  There is no guarantee that even if the test is normal but she will not have symptoms after the procedure just as a natural course of operating on a nerve root.  There is nothing to be gained by testing.  There is the possibility of living with a suprapubic tube to avoid the incontinence.  There is also the option to add overactive bladder medication.  They have a myriad of side effects at a dose that would likely be helpful to overpower the nerve input.  She is going to consider her options.  PVR today was 109 mL.  She had just a trace of blood in the urine.  Unlikely that this is kidney stones given that her last CT was normal in 2023.    Plan: Call if wants overactive bladder medication.  Call if wants Hernandez catheter.  Call if symptoms do not  improve after surgery.  Another option would be consider Botox injection to the bladder.  Not clear whether 100, 200, or 300 units would be effective for this.  Patient would have to accept the risk of needing to do self catheter chronic Hernandez if unable to void afterwards.     Radiology  MRI October 2023 outside imaging facility (report only)  S1 nerve root schwannoma enlarged versus 2018 imaging.   History  History of kidney stone prior to 2012.  CT imaging September 2023 showed no new stones  S1 nerve root schwannoma enlarged versus 2018 imaging causing urinary urgency, frequency, and urge incontinence.  Urodynamics deferred    6/11/2024 OV [facility]am[facility]  Patient referred from the neurosurgeon regarding an S1 schwannoma that he is considering operating on.  My understanding is that he would either debulk the tumor and/or make more of a space for it by opening up the nerve space by removing bone.  Long discussion with the patient and her  about the clinical implications of having nerves to the bladder pressed upon centrally.  I suspect that the reason for requesting urodynamics was to determine that there is some overactivity to the bladder.  She is already evidencing this by being incontinent of 3 pads per day.  MRI and 2023 showed growth of the schwannoma versus 2018.  She has some back pain from it to and is considering surgery.  I reassured her that urodynamics is only going to show that the bladder is overactive.  It will not really be able to isolate the nerve root.  In fact some people have overactive bladder just as a result of the test because you have to insert something vaginally and within the bladder.  In and of themselves those can be irritating to the bladder.  There is no guarantee that even if the test is normal but she will not have symptoms after the procedure just as a natural course of operating on a nerve root.  There is nothing to be gained by testing.  There is the possibility of  "living with a suprapubic tube to avoid the incontinence.  There is also the option to add overactive bladder medication.  They have a myriad of side effects at a dose that would likely be helpful to overpower the nerve input.  She is going to consider her options.    Plan: Call if wants overactive bladder medication.  Call if wants Hernandez catheter.  Call if symptoms do not improve after surgery.    Review of Systems    No results found for: \"PSA\"  No results found for: \"TESTOSTERONE\"  No components found for: \"CR\"  No results found for: \"HBA1C\"    Objective     /90 (BP Location: Left arm, Patient Position: Sitting, Cuff Size: Adult)   Pulse 76   Ht 5' 5\" (1.651 m)   Wt 92.1 kg (203 lb)   LMP 06/08/2024 (Exact Date)   SpO2 100%   BMI 33.78 kg/m²     Physical Exam      William Mohamud, [facility] [place]  "

## 2024-07-03 ENCOUNTER — TELEPHONE (OUTPATIENT)
Age: 34
End: 2024-07-03

## 2024-07-03 NOTE — TELEPHONE ENCOUNTER
Patient requesting her office visit notes be faxed over to neuro for her consult with them. Faxed office visit note from 3/29/23 and 5/30/23. No further action needed.

## 2024-07-16 ENCOUNTER — TELEPHONE (OUTPATIENT)
Dept: NEUROLOGY | Facility: CLINIC | Age: 34
End: 2024-07-16

## 2024-07-16 NOTE — TELEPHONE ENCOUNTER
Tried to contact the patient to confirm the appointment on 07/30/24, no answer, voicemail box full.

## 2024-07-25 ENCOUNTER — OFFICE VISIT (OUTPATIENT)
Dept: FAMILY MEDICINE CLINIC | Facility: CLINIC | Age: 34
End: 2024-07-25
Payer: COMMERCIAL

## 2024-07-25 VITALS
RESPIRATION RATE: 16 BRPM | OXYGEN SATURATION: 100 % | BODY MASS INDEX: 33.49 KG/M2 | SYSTOLIC BLOOD PRESSURE: 118 MMHG | TEMPERATURE: 97.6 F | HEIGHT: 65 IN | HEART RATE: 96 BPM | WEIGHT: 201 LBS | DIASTOLIC BLOOD PRESSURE: 88 MMHG

## 2024-07-25 DIAGNOSIS — M54.42 ACUTE LEFT-SIDED LOW BACK PAIN WITH LEFT-SIDED SCIATICA: ICD-10-CM

## 2024-07-25 DIAGNOSIS — M46.1 SACROILIITIS (HCC): ICD-10-CM

## 2024-07-25 DIAGNOSIS — M53.3 SACRAL MASS: Primary | ICD-10-CM

## 2024-07-25 PROCEDURE — 96372 THER/PROPH/DIAG INJ SC/IM: CPT | Performed by: STUDENT IN AN ORGANIZED HEALTH CARE EDUCATION/TRAINING PROGRAM

## 2024-07-25 PROCEDURE — 99213 OFFICE O/P EST LOW 20 MIN: CPT | Performed by: STUDENT IN AN ORGANIZED HEALTH CARE EDUCATION/TRAINING PROGRAM

## 2024-07-25 RX ORDER — DEXAMETHASONE SODIUM PHOSPHATE 4 MG/ML
4 INJECTION, SOLUTION INTRA-ARTICULAR; INTRALESIONAL; INTRAMUSCULAR; INTRAVENOUS; SOFT TISSUE ONCE
Status: COMPLETED | OUTPATIENT
Start: 2024-07-25 | End: 2024-07-25

## 2024-07-25 RX ORDER — LORATADINE 10 MG/1
10 TABLET ORAL AS NEEDED
COMMUNITY

## 2024-07-25 RX ORDER — KETOROLAC TROMETHAMINE 30 MG/ML
30 INJECTION, SOLUTION INTRAMUSCULAR; INTRAVENOUS ONCE
Status: COMPLETED | OUTPATIENT
Start: 2024-07-25 | End: 2024-07-25

## 2024-07-25 RX ORDER — FLUCONAZOLE 150 MG/1
150 TABLET ORAL AS NEEDED
COMMUNITY
Start: 2024-06-07 | End: 2024-07-26 | Stop reason: SDUPTHER

## 2024-07-25 RX ADMIN — DEXAMETHASONE SODIUM PHOSPHATE 4 MG: 4 INJECTION, SOLUTION INTRA-ARTICULAR; INTRALESIONAL; INTRAMUSCULAR; INTRAVENOUS; SOFT TISSUE at 13:18

## 2024-07-25 RX ADMIN — KETOROLAC TROMETHAMINE 30 MG: 30 INJECTION, SOLUTION INTRAMUSCULAR; INTRAVENOUS at 13:18

## 2024-07-25 NOTE — PROGRESS NOTES
"Ambulatory Visit  Name: Jeaneth Koenig      : 1990      MRN: [id number]  Encounter Provider: Lisandra Marroquin MD  Encounter Date: 2024   Encounter department: [facility]YSICIANS    Assessment & Plan   1. Sacral mass  -     Ambulatory referral to Spine & Pain Management; Future  2. Acute left-sided low back pain with left-sided sciatica  -     Ambulatory referral to Spine & Pain Management; Future  -     ketorolac (TORADOL) injection 30 mg  -     dexamethasone (DECADRON) injection 4 mg  3. Sacroiliitis (HCC)     History of Present Illness     HPI    Patient has a sacral mass. She notes she has lower back pain that radiates on left side. She notes today pain is 5/10. She notes slight improvement with tylenol and ibuprofen. She is following up with neurology to discuss radiation. Neurosurgery told her that mass is currently inoperable due to location. She notes she needs help with dealing with the pain.       Review of Systems   Constitutional:  Negative for activity change, chills, diaphoresis, fatigue and fever.   HENT:  Negative for congestion, postnasal drip, rhinorrhea and sore throat.    Respiratory:  Negative for cough, shortness of breath and wheezing.    Cardiovascular:  Negative for chest pain, palpitations and leg swelling.   Gastrointestinal:  Negative for abdominal pain, constipation, diarrhea, nausea and vomiting.   Musculoskeletal:  Positive for back pain. Negative for myalgias.   Skin:  Negative for rash.   Neurological:  Negative for weakness, light-headedness and headaches.   Psychiatric/Behavioral:  The patient is not nervous/anxious.        Objective     /88 (BP Location: Left arm, Patient Position: Sitting, Cuff Size: Large)   Pulse 96   Temp 97.6 °F (36.4 °C) (Temporal)   Resp 16   Ht 5' 5\" (1.651 m)   Wt 91.2 kg (201 lb)   LMP 07/10/2024 (Exact Date)   SpO2 100%   BMI 33.45 kg/m²     Physical Exam  Constitutional:       Appearance: Normal appearance. "   HENT:      Head: Normocephalic and atraumatic.   Cardiovascular:      Rate and Rhythm: Normal rate and regular rhythm.      Pulses: Normal pulses.      Heart sounds: Normal heart sounds.   Pulmonary:      Effort: Pulmonary effort is normal.      Breath sounds: Normal breath sounds.   Musculoskeletal:      Lumbar back: Decreased range of motion.   Neurological:      General: No focal deficit present.      Mental Status: She is alert and oriented to person, place, and time.   Psychiatric:         Mood and Affect: Mood normal.         Behavior: Behavior normal.         Thought Content: Thought content normal.         Judgment: Judgment normal.       Administrative Statements

## 2024-07-26 ENCOUNTER — TELEPHONE (OUTPATIENT)
Age: 34
End: 2024-07-26

## 2024-07-26 DIAGNOSIS — B37.31 YEAST VAGINITIS: Primary | ICD-10-CM

## 2024-07-26 DIAGNOSIS — K21.9 GASTROESOPHAGEAL REFLUX DISEASE, UNSPECIFIED WHETHER ESOPHAGITIS PRESENT: ICD-10-CM

## 2024-07-26 RX ORDER — FLUCONAZOLE 150 MG/1
150 TABLET ORAL AS NEEDED
Qty: 1 TABLET | Refills: 1 | Status: SHIPPED | OUTPATIENT
Start: 2024-07-26 | End: 2024-07-27

## 2024-07-26 NOTE — TELEPHONE ENCOUNTER
Please relay to patient that hot flashes maybe a side effect of these medications. It is okay to take fluconazole. Will call and try and see if patient can get an earlier appt scheduled with pain management. Please advise patient to continue tylenol and OTC lidocaine patches for pain.

## 2024-07-26 NOTE — TELEPHONE ENCOUNTER
Patient called to update PCP that earliest appointment for Spine and Pain is not until end of August. Patient continues to have pain, current 5/10. Inquiring if it was okay to take fluconazole after decadron and Toradol injections given in OV yesterday, 7/25/24. RN verified on micromedex and safe to take.     Patient also reported having a new side effect of hot flashes multiple time a day and then selves resolves, patient unsure of possible allergic reaction. Please advise.

## 2024-07-28 RX ORDER — OMEPRAZOLE 40 MG/1
40 CAPSULE, DELAYED RELEASE ORAL
Qty: 100 CAPSULE | Refills: 0 | Status: SHIPPED | OUTPATIENT
Start: 2024-07-28

## 2024-07-29 ENCOUNTER — TELEPHONE (OUTPATIENT)
Age: 34
End: 2024-07-29

## 2024-07-29 NOTE — TELEPHONE ENCOUNTER
Patient wanted to ask Dr. Harris if she can have another shot for the pain call Toradol. Please advise

## 2024-07-30 ENCOUNTER — OFFICE VISIT (OUTPATIENT)
Dept: FAMILY MEDICINE CLINIC | Facility: CLINIC | Age: 34
End: 2024-07-30
Payer: COMMERCIAL

## 2024-07-30 VITALS
OXYGEN SATURATION: 99 % | WEIGHT: 201 LBS | TEMPERATURE: 97.2 F | RESPIRATION RATE: 16 BRPM | SYSTOLIC BLOOD PRESSURE: 118 MMHG | BODY MASS INDEX: 33.49 KG/M2 | DIASTOLIC BLOOD PRESSURE: 78 MMHG | HEART RATE: 109 BPM | HEIGHT: 65 IN

## 2024-07-30 DIAGNOSIS — M54.42 ACUTE LEFT-SIDED LOW BACK PAIN WITH LEFT-SIDED SCIATICA: Primary | ICD-10-CM

## 2024-07-30 DIAGNOSIS — B37.31 CANDIDAL VAGINITIS: ICD-10-CM

## 2024-07-30 PROCEDURE — 99213 OFFICE O/P EST LOW 20 MIN: CPT | Performed by: STUDENT IN AN ORGANIZED HEALTH CARE EDUCATION/TRAINING PROGRAM

## 2024-07-30 PROCEDURE — 96372 THER/PROPH/DIAG INJ SC/IM: CPT | Performed by: STUDENT IN AN ORGANIZED HEALTH CARE EDUCATION/TRAINING PROGRAM

## 2024-07-30 RX ORDER — FLUCONAZOLE 150 MG/1
150 TABLET ORAL ONCE
Qty: 1 TABLET | Refills: 0 | Status: SHIPPED | OUTPATIENT
Start: 2024-07-30 | End: 2024-07-30

## 2024-07-30 RX ORDER — KETOROLAC TROMETHAMINE 30 MG/ML
30 INJECTION, SOLUTION INTRAMUSCULAR; INTRAVENOUS ONCE
Status: COMPLETED | OUTPATIENT
Start: 2024-07-30 | End: 2024-07-30

## 2024-07-30 RX ADMIN — KETOROLAC TROMETHAMINE 30 MG: 30 INJECTION, SOLUTION INTRAMUSCULAR; INTRAVENOUS at 09:06

## 2024-07-30 NOTE — PROGRESS NOTES
"Ambulatory Visit  Name: Jeaneth Koenig      : 1990      MRN: [id number]  Encounter Provider: Lisandra Marroquin MD  Encounter Date: 2024   Encounter department: [facility]    Assessment & Plan   1. Acute left-sided low back pain with left-sided sciatica  -     ketorolac (TORADOL) injection 30 mg  2. Candidal vaginitis  -     fluconazole (DIFLUCAN) 150 mg tablet; Take 1 tablet (150 mg total) by mouth once for 1 dose    Patient scheduled appt with pain management in Aug       History of Present Illness     HPI    Patient presents for back pain. She would like a toradol shot. She notes the last one she got helped her pain for a few days. She notes she has a yeast infection. She will follow up with pain management at the end of August.       Review of Systems   Constitutional:  Negative for activity change, chills, diaphoresis, fatigue and fever.   HENT:  Negative for congestion, postnasal drip, rhinorrhea and sore throat.    Respiratory:  Negative for cough, shortness of breath and wheezing.    Cardiovascular:  Negative for chest pain, palpitations and leg swelling.   Gastrointestinal:  Negative for abdominal pain, constipation, diarrhea, nausea and vomiting.   Musculoskeletal:  Positive for back pain. Negative for myalgias.   Skin:  Negative for rash.   Neurological:  Negative for weakness, light-headedness and headaches.   Psychiatric/Behavioral:  The patient is not nervous/anxious.        Objective     /78 (BP Location: Left arm, Patient Position: Sitting, Cuff Size: Large)   Pulse (!) 109   Temp (!) 97.2 °F (36.2 °C) (Temporal)   Resp 16   Ht 5' 5\" (1.651 m)   Wt 91.2 kg (201 lb)   LMP 07/10/2024 (Exact Date)   SpO2 99%   BMI 33.45 kg/m²     Physical Exam  Constitutional:       Appearance: Normal appearance.   HENT:      Head: Normocephalic and atraumatic.   Cardiovascular:      Rate and Rhythm: Normal rate and regular rhythm.      Pulses: Normal pulses.      Heart " sounds: Normal heart sounds.   Pulmonary:      Effort: Pulmonary effort is normal.      Breath sounds: Normal breath sounds.   Musculoskeletal:      Lumbar back: Tenderness present. Decreased range of motion.   Neurological:      General: No focal deficit present.      Mental Status: She is alert and oriented to person, place, and time.   Psychiatric:         Mood and Affect: Mood normal.         Behavior: Behavior normal.         Thought Content: Thought content normal.         Judgment: Judgment normal.       Administrative Statements

## 2024-07-31 ENCOUNTER — TELEPHONE (OUTPATIENT)
Dept: NEUROLOGY | Facility: CLINIC | Age: 34
End: 2024-07-31

## 2024-07-31 NOTE — TELEPHONE ENCOUNTER
Per Miquel I contacted the Patient to obtain more information Re: her upcoming appointment. I left [place] for a Patient call back.

## 2024-08-08 DIAGNOSIS — R11.0 NAUSEA: Primary | ICD-10-CM

## 2024-08-08 RX ORDER — ONDANSETRON 4 MG/1
4 TABLET, FILM COATED ORAL EVERY 8 HOURS PRN
Qty: 20 TABLET | Refills: 0 | Status: SHIPPED | OUTPATIENT
Start: 2024-08-08

## 2024-08-14 ENCOUNTER — TELEPHONE (OUTPATIENT)
Age: 34
End: 2024-08-14

## 2024-08-14 NOTE — TELEPHONE ENCOUNTER
Lissett from [facility] called asking us to send a copy of the MRI report that patient had in 2018.  She could not tell me an MRI of what part of the body, and I could find no report in chart for an MRI done in 2018.  I asked Lissett if she contacted the facility where the MRI was done and she has a call into that facility.  If we have a copy of the MRI from 2018, please contact Lissett at # provided.  Thank you.

## 2024-08-14 NOTE — TELEPHONE ENCOUNTER
No record of a MRI from 2018 to send.  Anya left a message for the facility.  No further action required

## 2024-08-28 ENCOUNTER — CONSULT (OUTPATIENT)
Dept: PAIN MEDICINE | Facility: CLINIC | Age: 34
End: 2024-08-28
Payer: COMMERCIAL

## 2024-08-28 VITALS
OXYGEN SATURATION: 98 % | WEIGHT: 201 LBS | SYSTOLIC BLOOD PRESSURE: 133 MMHG | BODY MASS INDEX: 33.49 KG/M2 | HEART RATE: 85 BPM | DIASTOLIC BLOOD PRESSURE: 80 MMHG | HEIGHT: 65 IN

## 2024-08-28 DIAGNOSIS — M54.32 SCIATICA OF LEFT SIDE: ICD-10-CM

## 2024-08-28 DIAGNOSIS — M53.3 SACRAL MASS: Primary | ICD-10-CM

## 2024-08-28 DIAGNOSIS — M54.6 CHRONIC BILATERAL THORACIC BACK PAIN: ICD-10-CM

## 2024-08-28 DIAGNOSIS — G89.29 CHRONIC BILATERAL THORACIC BACK PAIN: ICD-10-CM

## 2024-08-28 DIAGNOSIS — G43.909 MIGRAINE WITHOUT STATUS MIGRAINOSUS, NOT INTRACTABLE, UNSPECIFIED MIGRAINE TYPE: ICD-10-CM

## 2024-08-28 PROCEDURE — 99204 OFFICE O/P NEW MOD 45 MIN: CPT | Performed by: STUDENT IN AN ORGANIZED HEALTH CARE EDUCATION/TRAINING PROGRAM

## 2024-08-28 RX ORDER — PREGABALIN 50 MG/1
50 CAPSULE ORAL 2 TIMES DAILY
Qty: 60 CAPSULE | Refills: 0 | Status: SHIPPED | OUTPATIENT
Start: 2024-08-28 | End: 2024-09-27

## 2024-08-28 NOTE — PROGRESS NOTES
Assessment:  1. Sacral mass    2. Sciatica of left side    3. Migraine without status migrainosus, not intractable, unspecified migraine type    4. Chronic bilateral thoracic back pain    Patient is a pleasant 33-year-old woman who presents as a new patient visit for neck pain, headaches, mid back pain, bilateral hand pain and low back pain with left-sided radicular symptoms.  Of note patient does have an MRI of her pelvis From October 2023 which did demonstrate enlargement of the left S1 sacral neural foramina with a ovoid soft tissue mass measuring 3.6 x 2.2 x 2 cm with signal on T1 concerning for schwannoma.  SI joints at this time are normal.  Over the past month the intensity pain has been moderate to severe she rates pain currently is 8 out of 10 on numeric rating scale.  The pain does interfere with her daily activities.  The pain occurs constantly and there is no Pateman symptoms are better or worse.  She describes pain as cramping, shooting, pressure, dull aching some associated weakness in the left leg.  She does not use any assistive devices.  Activities that increase her pain include lying down, standing, sitting, walking, exercise, relaxation, menstruation.  Prior surgical history significant for 2 prior C-sections, laparoscopic surgery for endometriosis and sinus surgery.  Past medical history seen for fibromyalgia and reflux.  Prior pain treatments include he denies provides moderate relief, chiropractic and ablation provides no relief, physical therapy tries no relief and exercise therapy/no relief.  Current medications include Tylenol, aspirin and ibuprofen.  She tried Xanax and Flexeril in the past.  She also tried Elavil in the past, Imitrex and Topamax.  Patient has been prescribed Percocet by her PCP over the past 2 years.    Patient with pain related to a sacral mass.  Patient has been told by other neurosurgeons inoperable.  Discussed with patient that I would not trial any interventional  therapies given her lesion concerning for schwannoma but would like to start a neuropathic medication for living will help with her symptoms mildly of her sacral mass but also her longstanding fibromyalgia.  Given this we will likely start Lyrica 50 mg twice daily.  Titration schedule provided.  Patient is set to follow-up with neurology in regards to radiation therapy and will seek a second opinion from other neurosurgeons in regards to if surgery is an option.    Plan:  Start Lyrica 50 mg BID  Ambulatory referral to PT   Orders Placed This Encounter   Procedures   • Ambulatory referral to Physical Therapy     Standing Status:   Future     Standing Expiration Date:   8/28/2025     Referral Priority:   Routine     Referral Type:   Physical Therapy     Referral Reason:   Specialty Services Required     Requested Specialty:   Physical Therapy     Number of Visits Requested:   1     Expiration Date:   8/28/2025       New Medications Ordered This Visit   Medications   • pregabalin (LYRICA) 50 mg capsule     Sig: Take 1 capsule (50 mg total) by mouth 2 (two) times a day Start with one tablet nightly for 5 nights and then increase to twice a day     Dispense:  60 capsule     Refill:  0       My impressions and treatment recommendations were discussed in detail with the patient, who verbalized understanding and had no further questions.      Follow-up is planned in six weeks time or sooner as warranted.  Discharge instructions were provided. I personally saw and examined the patient and I agree with the above discussed plan of care.    History of Present Illness:    Jeaneth Koenig is a 33 y.o. female who presents to [facility] for initial evaluation of the above stated pain complaints. The patient has a past medical and chronic pain history as outlined in the assessment section. She was referred by Suman Correia MD  [address] [place] [address].    Patient is a pleasant 33-year-old woman  who presents as a new patient visit for neck pain, headaches, mid back pain, bilateral hand pain and low back pain with left-sided radicular symptoms.  Of note patient does have an MRI of her pelvis From October 2023 which did demonstrate enlargement of the left S1 sacral neural foramina with a ovoid soft tissue mass measuring 3.6 x 2.2 x 2 cm with signal on T1 concerning for schwannoma.  SI joints at this time are normal.  Over the past month the intensity pain has been moderate to severe she rates pain currently is 8 out of 10 on numeric rating scale.  The pain does interfere with her daily activities.  The pain occurs constantly and there is no Pateman symptoms are better or worse.  She describes pain as cramping, shooting, pressure, dull aching some associated weakness in the left leg.  She does not use any assistive devices.  Activities that increase her pain include lying down, standing, sitting, walking, exercise, relaxation, menstruation.  Prior surgical history significant for 2 prior C-sections, laparoscopic surgery for endometriosis and sinus surgery.  Past medical history seen for fibromyalgia and reflux.  Prior pain treatments include he denies provides moderate relief, chiropractic and ablation provides no relief, physical therapy tries no relief and exercise therapy/no relief.  Current medications include Tylenol, aspirin and ibuprofen.  She tried Xanax and Flexeril in the past.  She also tried Elavil in the past, Imitrex and Topamax.    Review of Systems:    Review of Systems   Constitutional:  Negative for chills and fatigue.   HENT:  Negative for ear pain, mouth sores and sinus pressure.    Eyes:  Negative for pain, redness and visual disturbance.   Respiratory:  Negative for shortness of breath and wheezing.    Cardiovascular:  Negative for chest pain and palpitations.   Gastrointestinal:  Negative for abdominal pain and nausea.   Endocrine: Negative for polyphagia.   Genitourinary:  Positive for  frequency (urine).   Musculoskeletal:  Positive for back pain, gait problem, joint swelling (ankle), neck pain and neck stiffness. Negative for arthralgias.        Decreased ROM, joint and muscle pain   Skin:  Negative for wound.   Neurological:  Positive for weakness and headaches. Negative for seizures.   Psychiatric/Behavioral:  Positive for dysphoric mood and sleep disturbance.            Past Medical History:   Diagnosis Date   • Atopic dermatitis     Resolved: [id number]   • Bunion of great toe of right foot     Last Assessed: [id number]   • Chronic sinusitis     Resolved: [id number]   • Herpes labialis     Resolved: [id number]   • Kidney stone    • Ovarian cyst    • Peptic ulcer    • PONV (postoperative nausea and vomiting)        Past Surgical History:   Procedure Laterality Date   •  SECTION  2015   •  SECTION  10/20/2017   • EGD     • SINUS SURGERY     • WISDOM TOOTH EXTRACTION         Family History   Problem Relation Age of Onset   • Asthma Mother    • Hyperlipidemia Father    • Hypertension Father    • Stroke Maternal Grandmother    • Hypertension Maternal Grandmother    • Breast cancer Paternal Grandmother    • Breast cancer Paternal Aunt    • Substance Abuse Neg Hx    • Mental illness Neg Hx        Social History     Occupational History   • Not on file   Tobacco Use   • Smoking status: Never     Passive exposure: Never   • Smokeless tobacco: Never   Vaping Use   • Vaping status: Never Used   Substance and Sexual Activity   • Alcohol use: Yes     Comment: occasional   • Drug use: No   • Sexual activity: Yes     Birth control/protection: None         Current Outpatient Medications:   •  Acetylcarnitine HCl (Acetyl L-Carnitine) 500 MG CAPS, , Disp: , Rfl:   •  aspirin-acetaminophen-caffeine (Excedrin Extra Strength) 250-250-65 MG per tablet, , Disp: , Rfl:   •  aspirin-acetaminophen-caffeine (EXCEDRIN MIGRAINE) 250-250-65 MG per tablet, Take 2 tablets by mouth every 6 (six) hours  "as needed for headaches or moderate pain, Disp: , Rfl:   •  famotidine (PEPCID) 40 MG tablet, Take 1 tablet (40 mg total) by mouth daily, Disp: 90 tablet, Rfl: 0  •  fluticasone (FLONASE) 50 mcg/act nasal spray, 2 sprays into each nostril as needed , Disp: , Rfl: 0  •  Inositol-D Chiro-Inositol (Ovasitol) 2000-50 MG PACK, , Disp: , Rfl:   •  loratadine (CLARITIN) 10 mg tablet, Take 10 mg by mouth as needed for allergies, Disp: , Rfl:   •  omeprazole (PriLOSEC) 40 MG capsule, Take 1 capsule (40 mg total) by mouth daily before breakfast, Disp: 100 capsule, Rfl: 0  •  ondansetron (ZOFRAN) 4 mg tablet, Take 1 tablet (4 mg total) by mouth every 8 (eight) hours as needed for nausea or vomiting, Disp: 20 tablet, Rfl: 0  •  oxyCODONE-acetaminophen (PERCOCET) 5-325 mg per tablet, Take 1 tablet by mouth every 6 (six) hours as needed for moderate pain Max Daily Amount: 4 tablets, Disp: 30 tablet, Rfl: 0  •  pregabalin (LYRICA) 50 mg capsule, Take 1 capsule (50 mg total) by mouth 2 (two) times a day Start with one tablet nightly for 5 nights and then increase to twice a day, Disp: 60 capsule, Rfl: 0  •  tretinoin (RETIN-A) 0.01 % gel, Apply topically , Disp: , Rfl:   •  valACYclovir (VALTREX) 1,000 mg tablet, Take 1 tablet (1,000 mg total) by mouth 3 (three) times a day for 7 days (Patient taking differently: Take 1,000 mg by mouth if needed), Disp: 21 tablet, Rfl: 4    Allergies   Allergen Reactions   • Elavil [Amitriptyline] Drowsiness   • Prednisone GI Intolerance   • Tramadol Itching   • Vicodin [Hydrocodone-Acetaminophen] Itching   • Latex Hives       Physical Exam:    /80   Pulse 85   Ht 5' 5\" (1.651 m)   Wt 91.2 kg (201 lb)   SpO2 98%   BMI 33.45 kg/m²     Constitutional: normal, well developed, well nourished, alert, in no distress and non-toxic and no overt pain behavior.  Eyes: anicteric  HEENT: grossly intact  Neck: supple, symmetric, trachea midline and no masses   Pulmonary:even and " unlabored  Cardiovascular:No edema or pitting edema present  Skin:Normal without rashes or lesions and well hydrated  Psychiatric:Mood and affect appropriate  Neurologic:Cranial Nerves II-XII grossly intact  Musculoskeletal:normal gait.     Imaging        No orders to display       Orders Placed This Encounter   Procedures   • Ambulatory referral to Physical Therapy

## 2024-08-29 DIAGNOSIS — N94.6 DYSMENORRHEA: ICD-10-CM

## 2024-08-29 RX ORDER — OXYCODONE AND ACETAMINOPHEN 5; 325 MG/1; MG/1
1 TABLET ORAL EVERY 6 HOURS PRN
Qty: 30 TABLET | Refills: 0 | Status: SHIPPED | OUTPATIENT
Start: 2024-08-29

## 2024-09-17 DIAGNOSIS — K21.9 GASTROESOPHAGEAL REFLUX DISEASE, UNSPECIFIED WHETHER ESOPHAGITIS PRESENT: ICD-10-CM

## 2024-09-17 DIAGNOSIS — R10.13 DYSPEPSIA: ICD-10-CM

## 2024-09-18 RX ORDER — OMEPRAZOLE 40 MG/1
40 CAPSULE, DELAYED RELEASE ORAL
Qty: 100 CAPSULE | Refills: 1 | Status: SHIPPED | OUTPATIENT
Start: 2024-09-18

## 2024-09-18 RX ORDER — FAMOTIDINE 40 MG/1
40 TABLET, FILM COATED ORAL DAILY
Qty: 90 TABLET | Refills: 1 | Status: SHIPPED | OUTPATIENT
Start: 2024-09-18

## 2024-09-22 ENCOUNTER — NURSE TRIAGE (OUTPATIENT)
Dept: OTHER | Facility: OTHER | Age: 34
End: 2024-09-22

## 2024-09-22 NOTE — TELEPHONE ENCOUNTER
----- Message from Vickie Priest sent at 6/24/2017  2:49 PM CDT -----  Contact: self   [phone]  Pt is calling to speak with the nurse about name of procedure and information concerning procedure.    Thank you!   Per on call provider, pt is ok to take Percocet today.

## 2024-09-22 NOTE — TELEPHONE ENCOUNTER
"Reason for Disposition  • Caller has medicine question only, adult not sick, AND triager answers question    Answer Assessment - Initial Assessment Questions  1. NAME of MEDICINE: \"What medicine(s) are you calling about?\"  Pt hurt her back while on vacation. She went to the ED and was given Flexeril at 0830. Pt would like to know if she can take Percocet for her back pain, even tho she had the Flexeril this morning?     ED send a Flexeril script to the pharmacy but the pharmacy is closed until tomorrow.    (Pt is prescribed the Percocet for endometriosis)    Protocols used: Medication Question Call-Adult-AH    "

## 2024-09-27 ENCOUNTER — TELEPHONE (OUTPATIENT)
Age: 34
End: 2024-09-27

## 2024-09-27 NOTE — TELEPHONE ENCOUNTER
Patient called and stated she was seen in the ER on 9/22 for back pain.Patient was prescribed  cyclobenzaprine (FLEXERIL) 5 MG tablet Take 1 Tablet (5 mg) with minimal relieve. Patient also taking three Advil  every six hours. Tylenol 650 mg every six with minimal relieve. Patient is not in the area,will be back tomorrow. Patient stated the dexamethasone  injection did help minimize the pain. Patient called requesting additional medication to help with the pain. Lower back pain 5/10.Please advise.

## 2024-09-28 NOTE — TELEPHONE ENCOUNTER
HAVE CINTIA CONTINUE THE ADVIL , INCREASE FLEXERIL TO 10 MG 2 TABS THREE TIMES A DAY    IF NOT BETTER - SHOULD BE SEEN

## 2024-09-30 NOTE — TELEPHONE ENCOUNTER
Patient called and stated Cinthya from the office called.Warm transferred to the office to assist with patients needs,patients call got dropped office Clinical staff will return patients call.

## 2024-09-30 NOTE — TELEPHONE ENCOUNTER
Spoke to Cinthya.  She was relaying the message from  to Jeaneth.  She was in the process of documenting.    Spoke to Jeaneth and informed her that the message that Arabella gave her was what Cinthya was calling for.    No further action required    Selene Acosta  /[place]

## 2024-10-18 ENCOUNTER — TELEPHONE (OUTPATIENT)
Age: 34
End: 2024-10-18

## 2024-10-18 NOTE — TELEPHONE ENCOUNTER
[facility] of [facility] called asking if order can be placed for genetic testing. The patient is coming in at 10 AM today. She states she is not sure if the patient needs a referral, but is asking for a script for genetic testing. Their fax number is [phone]

## 2024-10-29 DIAGNOSIS — B37.31 YEAST VAGINITIS: ICD-10-CM

## 2024-10-29 NOTE — TELEPHONE ENCOUNTER
Requested medication(s) are due for refill today: Yes  Patient has already received a courtesy refill: No  Other reason request has been forwarded to provider: Medication not assigned to a protocol, review manually

## 2024-10-30 RX ORDER — FLUCONAZOLE 150 MG/1
TABLET ORAL
Qty: 1 TABLET | Refills: 1 | Status: SHIPPED | OUTPATIENT
Start: 2024-10-30 | End: 2024-10-31

## 2024-11-25 DIAGNOSIS — Z00.00 ROUTINE GENERAL MEDICAL EXAMINATION AT A HEALTH CARE FACILITY: Primary | ICD-10-CM

## 2024-11-25 DIAGNOSIS — Z11.59 ENCOUNTER FOR HEPATITIS C SCREENING TEST FOR LOW RISK PATIENT: ICD-10-CM

## 2024-11-25 DIAGNOSIS — R53.83 OTHER FATIGUE: ICD-10-CM

## 2024-11-25 DIAGNOSIS — Z13.29 SCREENING FOR THYROID DISORDER: ICD-10-CM

## 2024-11-25 DIAGNOSIS — E55.9 VITAMIN D DEFICIENCY: ICD-10-CM

## 2024-11-25 DIAGNOSIS — Z13.220 SCREENING FOR LIPID DISORDERS: ICD-10-CM

## 2024-11-25 DIAGNOSIS — D64.9 ANEMIA, UNSPECIFIED TYPE: ICD-10-CM

## 2024-11-25 DIAGNOSIS — E61.1 IRON DEFICIENCY: ICD-10-CM

## 2024-11-27 ENCOUNTER — VBI (OUTPATIENT)
Dept: ADMINISTRATIVE | Facility: OTHER | Age: 34
End: 2024-11-27

## 2024-11-27 DIAGNOSIS — N94.6 DYSMENORRHEA: ICD-10-CM

## 2024-11-27 DIAGNOSIS — K21.9 GASTROESOPHAGEAL REFLUX DISEASE, UNSPECIFIED WHETHER ESOPHAGITIS PRESENT: ICD-10-CM

## 2024-11-27 DIAGNOSIS — R10.13 DYSPEPSIA: ICD-10-CM

## 2024-11-27 RX ORDER — OMEPRAZOLE 40 MG/1
40 CAPSULE, DELAYED RELEASE ORAL
Qty: 100 CAPSULE | Refills: 1 | Status: SHIPPED | OUTPATIENT
Start: 2024-11-27

## 2024-11-27 RX ORDER — FAMOTIDINE 40 MG/1
40 TABLET, FILM COATED ORAL DAILY
Qty: 90 TABLET | Refills: 0 | Status: SHIPPED | OUTPATIENT
Start: 2024-11-27

## 2024-11-27 NOTE — TELEPHONE ENCOUNTER
11/27/24 11:05 AM     Chart reviewed for Pap Smear (HPV) aka Cervical Cancer Screening was/were submitted to the patient's insurance.     Juan Hutchinson MA   [facility]

## 2024-11-29 RX ORDER — OXYCODONE AND ACETAMINOPHEN 5; 325 MG/1; MG/1
1 TABLET ORAL EVERY 6 HOURS PRN
Qty: 30 TABLET | Refills: 0 | Status: SHIPPED | OUTPATIENT
Start: 2024-11-29

## 2024-12-13 LAB
25(OH)D3+25(OH)D2 SERPL-MCNC: 24.5 NG/ML (ref 30–100)
ALBUMIN SERPL-MCNC: 4.1 G/DL (ref 3.9–4.9)
ALP SERPL-CCNC: 105 IU/L (ref 44–121)
ALT SERPL-CCNC: 14 IU/L (ref 0–32)
AST SERPL-CCNC: 18 IU/L (ref 0–40)
BASOPHILS # BLD AUTO: 0 X10E3/UL (ref 0–0.2)
BASOPHILS NFR BLD AUTO: 1 %
BILIRUB SERPL-MCNC: <0.2 MG/DL (ref 0–1.2)
BUN SERPL-MCNC: 12 MG/DL (ref 6–20)
BUN/CREAT SERPL: 19 (ref 9–23)
CALCIUM SERPL-MCNC: 9.4 MG/DL (ref 8.7–10.2)
CHLORIDE SERPL-SCNC: 104 MMOL/L (ref 96–106)
CHOLEST SERPL-MCNC: 188 MG/DL (ref 100–199)
CHOLEST/HDLC SERPL: 5.2 RATIO (ref 0–4.4)
CO2 SERPL-SCNC: 20 MMOL/L (ref 20–29)
CREAT SERPL-MCNC: 0.64 MG/DL (ref 0.57–1)
EGFR: 120 ML/MIN/1.73
EOSINOPHIL # BLD AUTO: 0.1 X10E3/UL (ref 0–0.4)
EOSINOPHIL NFR BLD AUTO: 2 %
ERYTHROCYTE [DISTWIDTH] IN BLOOD BY AUTOMATED COUNT: 12.9 % (ref 11.7–15.4)
GLOBULIN SER-MCNC: 2.8 G/DL (ref 1.5–4.5)
GLUCOSE SERPL-MCNC: 98 MG/DL (ref 70–99)
HCT VFR BLD AUTO: 38.5 % (ref 34–46.6)
HCV AB S/CO SERPL IA: NON REACTIVE
HDLC SERPL-MCNC: 36 MG/DL
HGB BLD-MCNC: 11.5 G/DL (ref 11.1–15.9)
IMM GRANULOCYTES # BLD: 0 X10E3/UL (ref 0–0.1)
IMM GRANULOCYTES NFR BLD: 0 %
IRON SERPL-MCNC: 41 UG/DL (ref 27–159)
LDLC SERPL CALC-MCNC: 117 MG/DL (ref 0–99)
LYMPHOCYTES # BLD AUTO: 1.9 X10E3/UL (ref 0.7–3.1)
LYMPHOCYTES NFR BLD AUTO: 32 %
MCH RBC QN AUTO: 23.4 PG (ref 26.6–33)
MCHC RBC AUTO-ENTMCNC: 29.9 G/DL (ref 31.5–35.7)
MCV RBC AUTO: 78 FL (ref 79–97)
MONOCYTES # BLD AUTO: 0.5 X10E3/UL (ref 0.1–0.9)
MONOCYTES NFR BLD AUTO: 8 %
NEUTROPHILS # BLD AUTO: 3.4 X10E3/UL (ref 1.4–7)
NEUTROPHILS NFR BLD AUTO: 57 %
PLATELET # BLD AUTO: 356 X10E3/UL (ref 150–450)
POTASSIUM SERPL-SCNC: 4.6 MMOL/L (ref 3.5–5.2)
PROT SERPL-MCNC: 6.9 G/DL (ref 6–8.5)
RBC # BLD AUTO: 4.91 X10E6/UL (ref 3.77–5.28)
SL AMB VLDL CHOLESTEROL CALC: 35 MG/DL (ref 5–40)
SODIUM SERPL-SCNC: 141 MMOL/L (ref 134–144)
TRIGL SERPL-MCNC: 196 MG/DL (ref 0–149)
TSH SERPL DL<=0.005 MIU/L-ACNC: 1.76 UIU/ML (ref 0.45–4.5)
WBC # BLD AUTO: 6 X10E3/UL (ref 3.4–10.8)

## 2024-12-17 NOTE — PATIENT INSTRUCTIONS
REST  WARM COMPRESS TO LEG  TRIAL OF DEPOMEDROL  MAY NEED FURTHER IMAGING IN NOT BETTER    R BREAST US FOR SMALL CYST    CALL IN 2-3 WEEKS WITH UPDATE, SOONER PRN PCP

## 2024-12-19 PROBLEM — R53.83 FATIGUE: Status: RESOLVED | Noted: 2021-11-10 | Resolved: 2024-12-19

## 2024-12-19 PROBLEM — M25.50 ARTHRALGIA: Status: RESOLVED | Noted: 2021-11-10 | Resolved: 2024-12-19

## 2024-12-19 PROBLEM — R63.5 WEIGHT GAIN: Status: RESOLVED | Noted: 2023-03-29 | Resolved: 2024-12-19

## 2024-12-19 NOTE — PATIENT INSTRUCTIONS
DISCUSSED HEALTH ISSUES  HEALTHY DIET AND EXERCISE  BW WILL BE REVIEWED   RECOMMEND CALCIUM [id number] MG DAILY  VITAMIN D3  1000 IU DAILY  RV IN 1 YEAR FOR ANNUAL EXAM, SOONER IF NEEDED    RV 6M    Recent Results (from the past 4 weeks)   Comprehensive metabolic panel    Collection Time: 12/12/24  9:22 AM   Result Value Ref Range    Glucose, Random 98 70 - 99 mg/dL    BUN 12 6 - 20 mg/dL    Creatinine 0.64 0.57 - 1.00 mg/dL    eGFR 120 >59 mL/min/1.73    SL AMB BUN/CREATININE RATIO 19 9 - 23    Sodium 141 134 - 144 mmol/L    Potassium 4.6 3.5 - 5.2 mmol/L    Chloride 104 96 - 106 mmol/L    CO2 20 20 - 29 mmol/L    CALCIUM 9.4 8.7 - 10.2 mg/dL    Protein, Total 6.9 6.0 - 8.5 g/dL    Albumin 4.1 3.9 - 4.9 g/dL    Globulin, Total 2.8 1.5 - 4.5 g/dL    TOTAL BILIRUBIN <0.2 0.0 - 1.2 mg/dL    Alk Phos Isoenzymes 105 44 - 121 IU/L    AST 18 0 - 40 IU/L    ALT 14 0 - 32 IU/L   CBC and differential    Collection Time: 12/12/24  9:22 AM   Result Value Ref Range    White Blood Cell Count 6.0 3.4 - 10.8 x10E3/uL    Red Blood Cell Count 4.91 3.77 - 5.28 x10E6/uL    Hemoglobin 11.5 11.1 - 15.9 g/dL    HCT 38.5 34.0 - 46.6 %    MCV 78 (L) 79 - 97 fL    MCH 23.4 (L) 26.6 - 33.0 pg    MCHC 29.9 (L) 31.5 - 35.7 g/dL    RDW 12.9 11.7 - 15.4 %    Platelet Count 356 150 - 450 x10E3/uL    Neutrophils 57 Not Estab. %    Lymphocytes 32 Not Estab. %    Monocytes 8 Not Estab. %    Eosinophils 2 Not Estab. %    Basophils PCT 1 Not Estab. %    Neutrophils (Absolute) 3.4 1.4 - 7.0 x10E3/uL    Lymphocytes (Absolute) 1.9 0.7 - 3.1 x10E3/uL    Monocytes (Absolute) 0.5 0.1 - 0.9 x10E3/uL    Eosinophils (Absolute) 0.1 0.0 - 0.4 x10E3/uL    Basophils ABS 0.0 0.0 - 0.2 x10E3/uL    Immature Granulocytes 0 Not Estab. %    Immature Granulocytes (Absolute) 0.0 0.0 - 0.1 x10E3/uL   Lipid panel    Collection Time: 12/12/24  9:22 AM   Result Value Ref Range    Cholesterol, Total 188 100 - 199 mg/dL    Triglycerides 196 (H) 0 - 149 mg/dL    HDL 36 (L) >39  mg/dL    VLDL Cholesterol Calculated 35 5 - 40 mg/dL    LDL Calculated 117 (H) 0 - 99 mg/dL    T. Chol/HDL Ratio 5.2 (H) 0.0 - 4.4 ratio   TSH, 3rd generation    Collection Time: 12/12/24  9:22 AM   Result Value Ref Range    TSH 1.760 0.450 - 4.500 uIU/mL   Hepatitis C antibody    Collection Time: 12/12/24  9:22 AM   Result Value Ref Range    HEP C AB Non Reactive Non Reactive   Vitamin D 25 hydroxy    Collection Time: 12/12/24  9:22 AM   Result Value Ref Range    25-HYDROXY VIT D 24.5 (L) 30.0 - 100.0 ng/mL   Iron    Collection Time: 12/12/24  9:22 AM   Result Value Ref Range    Iron, Serum 41 27 - 159 ug/dL

## 2024-12-20 ENCOUNTER — OFFICE VISIT (OUTPATIENT)
Dept: FAMILY MEDICINE CLINIC | Facility: CLINIC | Age: 34
End: 2024-12-20
Payer: COMMERCIAL

## 2024-12-20 VITALS
RESPIRATION RATE: 18 BRPM | HEART RATE: 100 BPM | SYSTOLIC BLOOD PRESSURE: 132 MMHG | HEIGHT: 65 IN | OXYGEN SATURATION: 99 % | WEIGHT: 205 LBS | BODY MASS INDEX: 34.16 KG/M2 | DIASTOLIC BLOOD PRESSURE: 90 MMHG | TEMPERATURE: 96.5 F

## 2024-12-20 DIAGNOSIS — M46.1 SACROILIITIS (HCC): ICD-10-CM

## 2024-12-20 DIAGNOSIS — R10.13 DYSPEPSIA: ICD-10-CM

## 2024-12-20 DIAGNOSIS — I73.00 RAYNAUD'S DISEASE WITHOUT GANGRENE: ICD-10-CM

## 2024-12-20 DIAGNOSIS — G43.909 MIGRAINE WITHOUT STATUS MIGRAINOSUS, NOT INTRACTABLE, UNSPECIFIED MIGRAINE TYPE: Primary | ICD-10-CM

## 2024-12-20 DIAGNOSIS — Z00.00 ROUTINE GENERAL MEDICAL EXAMINATION AT A HEALTH CARE FACILITY: ICD-10-CM

## 2024-12-20 PROCEDURE — 99395 PREV VISIT EST AGE 18-39: CPT | Performed by: FAMILY MEDICINE

## 2024-12-20 NOTE — LETTER
CINTIA MOHAN      Current Outpatient Medications:     Acetylcarnitine HCl (Acetyl L-Carnitine) 500 MG CAPS, , Disp: , Rfl:     aspirin-acetaminophen-caffeine (Excedrin Extra Strength) 250-250-65 MG per tablet, , Disp: , Rfl:     aspirin-acetaminophen-caffeine (EXCEDRIN MIGRAINE) 250-250-65 MG per tablet, Take 2 tablets by mouth every 6 (six) hours as needed for headaches or moderate pain, Disp: , Rfl:     famotidine (PEPCID) 40 MG tablet, Take 1 tablet (40 mg total) by mouth daily, Disp: 90 tablet, Rfl: 0    fluticasone (FLONASE) 50 mcg/act nasal spray, 2 sprays into each nostril as needed , Disp: , Rfl: 0    Inositol-D Chiro-Inositol (Ovasitol) 2000-50 MG PACK, , Disp: , Rfl:     loratadine (CLARITIN) 10 mg tablet, Take 10 mg by mouth as needed for allergies, Disp: , Rfl:     omeprazole (PriLOSEC) 40 MG capsule, Take 1 capsule (40 mg total) by mouth daily before breakfast, Disp: 100 capsule, Rfl: 1    ondansetron (ZOFRAN) 4 mg tablet, Take 1 tablet (4 mg total) by mouth every 8 (eight) hours as needed for nausea or vomiting, Disp: 20 tablet, Rfl: 0    oxyCODONE-acetaminophen (PERCOCET) 5-325 mg per tablet, Take 1 tablet by mouth every 6 (six) hours as needed for moderate pain Max Daily Amount: 4 tablets, Disp: 30 tablet, Rfl: 0    tretinoin (RETIN-A) 0.01 % gel, Apply topically , Disp: , Rfl:     valACYclovir (VALTREX) 1,000 mg tablet, Take 1 tablet (1,000 mg total) by mouth 3 (three) times a day for 7 days (Patient taking differently: Take 1,000 mg by mouth if needed), Disp: 21 tablet, Rfl: 4      Recent Results (from the past 4 weeks)   Comprehensive metabolic panel    Collection Time: 12/12/24  9:22 AM   Result Value Ref Range    Glucose, Random 98 70 - 99 mg/dL    BUN 12 6 - 20 mg/dL    Creatinine 0.64 0.57 - 1.00 mg/dL    eGFR 120 >59 mL/min/1.73    SL AMB BUN/CREATININE RATIO 19 9 - 23    Sodium 141 134 - 144 mmol/L    Potassium 4.6 3.5 - 5.2 mmol/L    Chloride 104 96 - 106 mmol/L    CO2 20 20 - 29 mmol/L     CALCIUM 9.4 8.7 - 10.2 mg/dL    Protein, Total 6.9 6.0 - 8.5 g/dL    Albumin 4.1 3.9 - 4.9 g/dL    Globulin, Total 2.8 1.5 - 4.5 g/dL    TOTAL BILIRUBIN <0.2 0.0 - 1.2 mg/dL    Alk Phos Isoenzymes 105 44 - 121 IU/L    AST 18 0 - 40 IU/L    ALT 14 0 - 32 IU/L   CBC and differential    Collection Time: 12/12/24  9:22 AM   Result Value Ref Range    White Blood Cell Count 6.0 3.4 - 10.8 x10E3/uL    Red Blood Cell Count 4.91 3.77 - 5.28 x10E6/uL    Hemoglobin 11.5 11.1 - 15.9 g/dL    HCT 38.5 34.0 - 46.6 %    MCV 78 (L) 79 - 97 fL    MCH 23.4 (L) 26.6 - 33.0 pg    MCHC 29.9 (L) 31.5 - 35.7 g/dL    RDW 12.9 11.7 - 15.4 %    Platelet Count 356 150 - 450 x10E3/uL    Neutrophils 57 Not Estab. %    Lymphocytes 32 Not Estab. %    Monocytes 8 Not Estab. %    Eosinophils 2 Not Estab. %    Basophils PCT 1 Not Estab. %    Neutrophils (Absolute) 3.4 1.4 - 7.0 x10E3/uL    Lymphocytes (Absolute) 1.9 0.7 - 3.1 x10E3/uL    Monocytes (Absolute) 0.5 0.1 - 0.9 x10E3/uL    Eosinophils (Absolute) 0.1 0.0 - 0.4 x10E3/uL    Basophils ABS 0.0 0.0 - 0.2 x10E3/uL    Immature Granulocytes 0 Not Estab. %    Immature Granulocytes (Absolute) 0.0 0.0 - 0.1 x10E3/uL   Lipid panel    Collection Time: 12/12/24  9:22 AM   Result Value Ref Range    Cholesterol, Total 188 100 - 199 mg/dL    Triglycerides 196 (H) 0 - 149 mg/dL    HDL 36 (L) >39 mg/dL    VLDL Cholesterol Calculated 35 5 - 40 mg/dL    LDL Calculated 117 (H) 0 - 99 mg/dL    T. Chol/HDL Ratio 5.2 (H) 0.0 - 4.4 ratio   TSH, 3rd generation    Collection Time: 12/12/24  9:22 AM   Result Value Ref Range    TSH 1.760 0.450 - 4.500 uIU/mL   Hepatitis C antibody    Collection Time: 12/12/24  9:22 AM   Result Value Ref Range    HEP C AB Non Reactive Non Reactive   Vitamin D 25 hydroxy    Collection Time: 12/12/24  9:22 AM   Result Value Ref Range    25-HYDROXY VIT D 24.5 (L) 30.0 - 100.0 ng/mL   Iron    Collection Time: 12/12/24  9:22 AM   Result Value Ref Range    Iron, Serum 41 27 - 159 ug/dL

## 2024-12-20 NOTE — PROGRESS NOTES
Depression Screening and Follow-up Plan: Patient was screened for depression during today's encounter. They screened negative with a PHQ-2 score of 2.    FAMILY PRACTICE HEALTH MAINTENANCE OFFICE VISIT  [facility]YS[facility]    NAME: Jeaneth Koenig  AGE: 34 y.o. SEX: female  : 1990     DATE: 2024    Assessment and Plan     Problem List Items Addressed This Visit        Cardiovascular and Mediastinum    Raynauds syndrome    Migraine without status migrainosus, not intractable - Primary       Orthopedic/Musculoskeletal    Sacroiliitis (HCC)       Other    Routine general medical examination at a health care facility    Dyspepsia            Return in about 6 months (around 2025) for Recheck.        Chief Complaint     Chief Complaint   Patient presents with   • Annual Exam       History of Present Illness     DISCUSSED HEALTH ISSUES  REVIEWED MEDICAL RECORD  NO CONCERNS AT THIS TIME            Well Adult Physical   Patient here for a comprehensive physical exam.      Diet and Physical Activity  Diet: well balanced diet  Weight concerns: Patient has class 1 obesity (BMI 30-34.9)  Exercise: infrequently      Depression Screen  PHQ-2/9 Depression Screening    Little interest or pleasure in doing things: 1 - several days  Feeling down, depressed, or hopeless: 1 - several days  PHQ-2 Score: 2  PHQ-2 Interpretation: Negative depression screen          General Health  Hearing: Normal:  bilateral  Vision: no vision problems  Dental: regular dental visits    Reproductive Health          The following portions of the patient's history were reviewed and updated as appropriate: allergies, current medications, past family history, past medical history, past social history, past surgical history and problem list.    Review of Systems     Review of Systems   Constitutional:  Negative for chills, fatigue and fever.   HENT:  Negative for congestion, ear discharge, ear pain,  mouth sores, postnasal drip, sore throat and trouble swallowing.    Eyes:  Negative for pain, discharge and visual disturbance.   Respiratory:  Negative for cough, shortness of breath and wheezing.    Cardiovascular:  Negative for chest pain, palpitations and leg swelling.   Gastrointestinal:  Negative for abdominal distention, abdominal pain, blood in stool, diarrhea and nausea.   Endocrine: Negative for polydipsia, polyphagia and polyuria.   Genitourinary:  Negative for dysuria, frequency, hematuria and urgency.   Musculoskeletal:  Negative for arthralgias, gait problem and joint swelling.   Skin:  Negative for pallor and rash.   Neurological:  Negative for dizziness, syncope, speech difficulty, weakness, light-headedness, numbness and headaches.   Hematological:  Negative for adenopathy.   Psychiatric/Behavioral:  Negative for behavioral problems, confusion and sleep disturbance. The patient is not nervous/anxious.        Past Medical History     Past Medical History:   Diagnosis Date   • Atopic dermatitis     Resolved: [id number]   • Bunion of great toe of right foot     Last Assessed: [id number]   • Chronic sinusitis     Resolved: [id number]   • Herpes labialis     Resolved: [id number]   • Kidney stone    • Ovarian cyst    • Peptic ulcer    • PONV (postoperative nausea and vomiting)        Past Surgical History     Past Surgical History:   Procedure Laterality Date   •  SECTION  2015   •  SECTION  10/20/2017   • EGD     • SINUS SURGERY     • WISDOM TOOTH EXTRACTION         Social History     Social History     Socioeconomic History   • Marital status: /Civil Union     Spouse name: None   • Number of children: None   • Years of education: None   • Highest education level: None   Occupational History   • None   Tobacco Use   • Smoking status: Never     Passive exposure: Never   • Smokeless tobacco: Never   Vaping Use   • Vaping status: Never Used   Substance and Sexual Activity   •  Alcohol use: Yes     Comment: occasional   • Drug use: No   • Sexual activity: Yes     Birth control/protection: None   Other Topics Concern   • None   Social History Narrative    Caffeine use    Cultural backgroun: Non-    Dental care, regularly    Patient ingests cola containing caffeine    Primary spoken language English    Tea     Social Drivers of Health     Financial Resource Strain: Not on file   Food Insecurity: Not on file   Transportation Needs: Not on file   Physical Activity: Not on file   Stress: Not on file   Social Connections: Not on file   Intimate Partner Violence: Not on file   Housing Stability: Not on file       Family History     Family History   Problem Relation Age of Onset   • Asthma Mother    • Hyperlipidemia Father    • Hypertension Father    • Stroke Maternal Grandmother    • Hypertension Maternal Grandmother    • Breast cancer Paternal Grandmother    • Breast cancer Paternal Aunt    • Substance Abuse Neg Hx    • Mental illness Neg Hx        Current Medications       Current Outpatient Medications:   •  Acetylcarnitine HCl (Acetyl L-Carnitine) 500 MG CAPS, , Disp: , Rfl:   •  aspirin-acetaminophen-caffeine (EXCEDRIN MIGRAINE) 250-250-65 MG per tablet, Take 2 tablets by mouth every 6 (six) hours as needed for headaches or moderate pain, Disp: , Rfl:   •  famotidine (PEPCID) 40 MG tablet, Take 1 tablet (40 mg total) by mouth daily, Disp: 90 tablet, Rfl: 0  •  fluticasone (FLONASE) 50 mcg/act nasal spray, 2 sprays into each nostril as needed , Disp: , Rfl: 0  •  loratadine (CLARITIN) 10 mg tablet, Take 10 mg by mouth as needed for allergies, Disp: , Rfl:   •  omeprazole (PriLOSEC) 40 MG capsule, Take 1 capsule (40 mg total) by mouth daily before breakfast, Disp: 100 capsule, Rfl: 1  •  ondansetron (ZOFRAN) 4 mg tablet, Take 1 tablet (4 mg total) by mouth every 8 (eight) hours as needed for nausea or vomiting, Disp: 20 tablet, Rfl: 0  •  oxyCODONE-acetaminophen (PERCOCET) 5-325 mg per  "tablet, Take 1 tablet by mouth every 6 (six) hours as needed for moderate pain Max Daily Amount: 4 tablets, Disp: 30 tablet, Rfl: 0  •  tretinoin (RETIN-A) 0.01 % gel, Apply topically , Disp: , Rfl:   •  valACYclovir (VALTREX) 1,000 mg tablet, Take 1 tablet (1,000 mg total) by mouth 3 (three) times a day for 7 days, Disp: 21 tablet, Rfl: 4     Allergies     Allergies   Allergen Reactions   • Elavil [Amitriptyline] Drowsiness   • Peanut Oil - Food Allergy Other (See Comments)     Had allergy testing   • Prednisone GI Intolerance   • Tramadol Itching   • Vicodin [Hydrocodone-Acetaminophen] Itching   • Latex Hives       Objective     /90 (BP Location: Left arm, Patient Position: Sitting, Cuff Size: Large)   Pulse 100   Temp (!) 96.5 °F (35.8 °C) (Temporal)   Resp 18   Ht 5' 4.5\" (1.638 m)   Wt 93 kg (205 lb)   LMP 12/02/2024 (Approximate)   SpO2 99%   BMI 34.64 kg/m²      Physical Exam  Vitals reviewed.   Constitutional:       General: She is not in acute distress.     Appearance: Normal appearance. She is well-developed. She is obese. She is not ill-appearing.   HENT:      Head: Normocephalic and atraumatic.      Right Ear: Tympanic membrane and external ear normal.      Left Ear: Tympanic membrane and external ear normal.      Nose: Nose normal.      Mouth/Throat:      Mouth: Mucous membranes are moist.   Eyes:      General:         Right eye: No discharge.         Left eye: No discharge.      Conjunctiva/sclera: Conjunctivae normal.      Pupils: Pupils are equal, round, and reactive to light.   Neck:      Thyroid: No thyromegaly.   Cardiovascular:      Rate and Rhythm: Normal rate and regular rhythm.      Heart sounds: Normal heart sounds. No murmur heard.  Pulmonary:      Effort: Pulmonary effort is normal.      Breath sounds: Normal breath sounds. No wheezing or rales.   Abdominal:      General: Bowel sounds are normal. There is no distension.      Palpations: Abdomen is soft. There is no mass.      " Tenderness: There is no abdominal tenderness. There is no guarding or rebound.   Musculoskeletal:         General: No tenderness or deformity. Normal range of motion.      Cervical back: Normal range of motion and neck supple.   Lymphadenopathy:      Cervical: No cervical adenopathy.   Skin:     General: Skin is warm and dry.      Findings: No erythema or rash.   Neurological:      General: No focal deficit present.      Mental Status: She is alert and oriented to person, place, and time.      Cranial Nerves: No cranial nerve deficit.      Sensory: No sensory deficit.      Motor: No weakness or abnormal muscle tone.      Coordination: Coordination normal.      Gait: Gait normal.      Deep Tendon Reflexes: Reflexes are normal and symmetric. Reflexes normal.   Psychiatric:         Mood and Affect: Mood normal.         Behavior: Behavior normal.         Thought Content: Thought content normal.         Judgment: Judgment normal.           No results found.    Health Maintenance     Health Maintenance   Topic Date Due   • Influenza Vaccine (1) 09/01/2024   • COVID-19 Vaccine (4 - 2024-25 season) 09/01/2024   • Annual Physical  12/19/2024   • Depression Screening  12/20/2025   • DTaP,Tdap,and Td Vaccines (7 - Td or Tdap) 07/28/2026   • Cervical Cancer Screening  05/26/2027   • Zoster Vaccine (1 of 2) 12/08/2042   • RSV Vaccine Age 60+ Years (1 - 1-dose 75+ series) 12/08/2067   • Hepatitis C Screening  Completed   • HIB Vaccine  Completed   • IPV Vaccine  Completed   • HIV Screening  Addressed   • Meningococcal B Vaccine  Aged Out   • RSV Vaccine age 0-20 Months  Aged Out   • Pneumococcal Vaccine: Pediatrics (0 to 5 Years) and At-Risk Patients (6 to 64 Years)  Aged Out   • Hepatitis A Vaccine  Aged Out   • Meningococcal ACWY Vaccine  Aged Out   • HPV Vaccine  Aged Out     Immunization History   Administered Date(s) Administered   • COVID-19 PFIZER VACCINE 0.3 ML IM 04/15/2021, 05/06/2021, 01/08/2022   • DTP 02/08/1993,  04/07/1993, 06/28/1993, 06/14/1994, 08/25/1998   • Hib (PRP-OMP) 02/08/1993, 04/07/1993, 12/10/1993   • INFLUENZA 10/22/2018   • IPV 02/08/1993, 04/07/1993, 06/14/1994, 08/25/1998   • Influenza, injectable, quadrivalent, preservative free 0.5 mL 10/22/2018, 11/14/2019, 10/14/2020   • MMR 03/17/1994, 08/25/1998   • Meningococcal, Unknown Serogroups 05/04/2010   • Td (adult), adsorbed 06/21/2007   • Tdap 07/28/2016   • Tuberculin Skin Test 09/09/1993   • Tuberculin Skin Test-PPD Intradermal 12/04/2014       Suman Correia MD  [facility]

## 2025-01-15 DIAGNOSIS — N39.0 URINARY TRACT INFECTION WITHOUT HEMATURIA, SITE UNSPECIFIED: Primary | ICD-10-CM

## 2025-01-15 RX ORDER — LEVOFLOXACIN 250 MG/1
250 TABLET, FILM COATED ORAL DAILY
Qty: 10 TABLET | Refills: 0 | Status: SHIPPED | OUTPATIENT
Start: 2025-01-15 | End: 2025-01-25

## 2025-01-19 PROBLEM — Z00.00 ROUTINE GENERAL MEDICAL EXAMINATION AT A HEALTH CARE FACILITY: Status: RESOLVED | Noted: 2018-07-24 | Resolved: 2025-01-19

## 2025-02-04 DIAGNOSIS — R10.13 DYSPEPSIA: ICD-10-CM

## 2025-02-04 DIAGNOSIS — K21.9 GASTROESOPHAGEAL REFLUX DISEASE, UNSPECIFIED WHETHER ESOPHAGITIS PRESENT: ICD-10-CM

## 2025-02-04 RX ORDER — OMEPRAZOLE 40 MG/1
40 CAPSULE, DELAYED RELEASE ORAL
Qty: 100 CAPSULE | Refills: 0 | Status: SHIPPED | OUTPATIENT
Start: 2025-02-04

## 2025-02-05 RX ORDER — FAMOTIDINE 40 MG/1
40 TABLET, FILM COATED ORAL DAILY
Qty: 90 TABLET | Refills: 1 | Status: SHIPPED | OUTPATIENT
Start: 2025-02-05

## 2025-02-28 DIAGNOSIS — N94.6 DYSMENORRHEA: ICD-10-CM

## 2025-02-28 RX ORDER — OXYCODONE AND ACETAMINOPHEN 5; 325 MG/1; MG/1
1 TABLET ORAL EVERY 6 HOURS PRN
Qty: 30 TABLET | Refills: 0 | Status: SHIPPED | OUTPATIENT
Start: 2025-02-28

## 2025-03-05 DIAGNOSIS — M40.203 KYPHOSIS OF CERVICOTHORACIC REGION, UNSPECIFIED KYPHOSIS TYPE: Primary | ICD-10-CM

## 2025-03-29 DIAGNOSIS — E66.811 CLASS 1 OBESITY DUE TO EXCESS CALORIES WITH SERIOUS COMORBIDITY AND BODY MASS INDEX (BMI) OF 34.0 TO 34.9 IN ADULT: Primary | ICD-10-CM

## 2025-03-29 DIAGNOSIS — E66.09 CLASS 1 OBESITY DUE TO EXCESS CALORIES WITH SERIOUS COMORBIDITY AND BODY MASS INDEX (BMI) OF 34.0 TO 34.9 IN ADULT: Primary | ICD-10-CM

## 2025-03-29 DIAGNOSIS — E28.2 PCOS (POLYCYSTIC OVARIAN SYNDROME): ICD-10-CM

## 2025-03-29 RX ORDER — TIRZEPATIDE 2.5 MG/.5ML
2.5 INJECTION, SOLUTION SUBCUTANEOUS WEEKLY
Qty: 2 ML | Refills: 0 | Status: SHIPPED | OUTPATIENT
Start: 2025-03-29 | End: 2025-04-26

## 2025-04-04 ENCOUNTER — TELEPHONE (OUTPATIENT)
Dept: FAMILY MEDICINE CLINIC | Facility: CLINIC | Age: 35
End: 2025-04-04

## 2025-04-04 NOTE — TELEPHONE ENCOUNTER
Jeaneth's insurance declined her Zepbound.  I'm not surprised.  She has [place] [employer].    I can try an appeal however there has to be a good reason.  Cardiac issues or sleep apnea?    Does she have any condition that I can try to appeal?

## 2025-04-04 NOTE — TELEPHONE ENCOUNTER
Sent the following note to Jeaneth through my chart.    Jarocho Eric     I spoke to Dr. Correia.  There is no other medical reason to appeal.  You can go on the website for wegovy or zepbound to see if they have an assistance program.     As mentioned before you can also discuss phentermine with Dr. Correia.  That is very affordable to pay out of pocket.

## 2025-04-07 NOTE — TELEPHONE ENCOUNTER
Patient called asking to speak to Selene. Reviewed chart and recent  message . She did call the patient assistance program for Wegovy, still not cost effective being 300 dollars a month . Can not take Phentermine due to a contra-indication with another medication she takes. Asking if Dr Correia has any other recommendations? Would a telemedicine visit be an option to discuss further?

## 2025-04-09 NOTE — TELEPHONE ENCOUNTER
Left a message.  Informed her that she can make an appt with [facility], but there really is nothing he can do.  It is her insurance company that is not covering it.  She has [place] [facility] and they usually just do not cover this RX.     has already stated that there are no other health issues that would qualify for an appeal,

## 2025-04-24 DIAGNOSIS — B00.9 HERPES SIMPLEX: ICD-10-CM

## 2025-04-24 DIAGNOSIS — B37.31 YEAST VAGINITIS: ICD-10-CM

## 2025-04-24 RX ORDER — VALACYCLOVIR HYDROCHLORIDE 1 G/1
1000 TABLET, FILM COATED ORAL 3 TIMES DAILY
Qty: 21 TABLET | Refills: 4 | Status: SHIPPED | OUTPATIENT
Start: 2025-04-24 | End: 2025-05-01

## 2025-04-24 NOTE — TELEPHONE ENCOUNTER
Requested medication(s) are due for refill today: No  Patient has already received a courtesy refill: No  Other reason request has been forwarded to provider:  Medication not assigned to a protocol, review manually.

## 2025-04-26 RX ORDER — FLUCONAZOLE 150 MG/1
TABLET ORAL
Qty: 1 TABLET | Refills: 1 | Status: SHIPPED | OUTPATIENT
Start: 2025-04-26 | End: 2025-04-27

## 2025-05-21 DIAGNOSIS — Z13.6 SCREENING FOR HYPERTENSION: ICD-10-CM

## 2025-05-21 DIAGNOSIS — Z13.220 SCREENING FOR LIPID DISORDERS: Primary | ICD-10-CM

## 2025-05-23 ENCOUNTER — TELEPHONE (OUTPATIENT)
Age: 35
End: 2025-05-23

## 2025-05-23 NOTE — TELEPHONE ENCOUNTER
Patient has upcoming appointment on Tuesday with  DR Ilan Nicole Requesting last office visit note faxed to [phone]

## 2025-05-28 ENCOUNTER — TELEPHONE (OUTPATIENT)
Age: 35
End: 2025-05-28

## 2025-05-28 NOTE — TELEPHONE ENCOUNTER
Patient had a CT scan with contrast yesterday and ever since then has had a headache, body aches, is very tired and her chest hurts a little bit. Asked if this was a normal reaction to that type of scan.

## 2025-05-30 DIAGNOSIS — K21.9 GASTROESOPHAGEAL REFLUX DISEASE, UNSPECIFIED WHETHER ESOPHAGITIS PRESENT: ICD-10-CM

## 2025-05-30 DIAGNOSIS — N94.6 DYSMENORRHEA: ICD-10-CM

## 2025-05-30 RX ORDER — OXYCODONE AND ACETAMINOPHEN 5; 325 MG/1; MG/1
1 TABLET ORAL EVERY 6 HOURS PRN
Qty: 30 TABLET | Refills: 0 | Status: SHIPPED | OUTPATIENT
Start: 2025-05-30

## 2025-05-30 RX ORDER — OMEPRAZOLE 40 MG/1
40 CAPSULE, DELAYED RELEASE ORAL
Qty: 100 CAPSULE | Refills: 0 | Status: SHIPPED | OUTPATIENT
Start: 2025-05-30

## 2025-05-30 NOTE — TELEPHONE ENCOUNTER
Requested medication(s) are due for refill today: Yes  Patient has already received a courtesy refill: No  Other reason request has been forwarded to provider:    This refill cannot be delegated    Valid encounter within last 3 months - Pt has an appt scheduled 6/20

## 2025-05-30 NOTE — TELEPHONE ENCOUNTER
I lmom for pt to please call back to schedule a 1yr f/u office visit for further medication refills. Will call again if do not hear back from pt.

## 2025-07-10 ENCOUNTER — TELEPHONE (OUTPATIENT)
Age: 35
End: 2025-07-10

## 2025-07-10 NOTE — TELEPHONE ENCOUNTER
The patient called to inform that she received a call from the Sleep study department and they request     The last visit note and insurance information     Fax number : [phone]

## 2025-07-17 ENCOUNTER — TELEPHONE (OUTPATIENT)
Age: 35
End: 2025-07-17

## 2025-07-17 NOTE — TELEPHONE ENCOUNTER
Jeaneth called and said her insurance company told her to call the doctor's office again regarding whether sleep study would be paid for. She has been going in a [place] with this. She provided the procedure code of 612[address] per [facility]. I think she may need an ambulatory referral (I did not see one) and then an insurance pre-authorization. She wasn't sure because the insurance company was not helpful. Please assist the patient with the referrals.

## 2025-07-17 NOTE — TELEPHONE ENCOUNTER
I Informed Jeaneth to call [facility] back and ask for the procedure code they use for a home sleep study then call her insurance co and give them the procedure code along with the diagnosis codes of snoring and fatigue which are on her hand written rx by dr Correia.  Jeaneth agreed and said she would keep us posted if she needs anything else.

## 2025-07-17 NOTE — TELEPHONE ENCOUNTER
Please advise  Patient called her Insurance as advised by  [facility] to see if home sleep study is covered, her Insurance told her to call her Primary Care Provider for they should know if approved or needs a PA.

## 2025-07-18 NOTE — TELEPHONE ENCOUNTER
Dr Correia Jeaneth needs a new order for a home sleep study generated in the computer.  They wont accept the written script pad order you already did, because it doesn't have any codes on it.  I verbally gave Jeaneth the codes, but that's also not being accepted.  I informed Jeaneth the order generated in the computer would be for [facility]'s, but she would be able to also use it at [facility]] if she wants.  Jeaneth has an appt with you on 7/30 and can be discussed more at that time.

## 2025-07-20 DIAGNOSIS — B37.31 YEAST VAGINITIS: ICD-10-CM

## 2025-07-21 DIAGNOSIS — E66.09 CLASS 1 OBESITY DUE TO EXCESS CALORIES WITH SERIOUS COMORBIDITY AND BODY MASS INDEX (BMI) OF 34.0 TO 34.9 IN ADULT: Primary | ICD-10-CM

## 2025-07-21 DIAGNOSIS — R53.83 OTHER FATIGUE: ICD-10-CM

## 2025-07-21 DIAGNOSIS — E66.811 CLASS 1 OBESITY DUE TO EXCESS CALORIES WITH SERIOUS COMORBIDITY AND BODY MASS INDEX (BMI) OF 34.0 TO 34.9 IN ADULT: Primary | ICD-10-CM

## 2025-07-21 NOTE — TELEPHONE ENCOUNTER
Left message on patients voicemail informing her of sleep study referral placed.  Questions,  please call our office. Thank You.  Temo/VOLODYMYR

## 2025-07-21 NOTE — TELEPHONE ENCOUNTER
Patient called in and would like an update on the at home Sleep Study request.     Advised patient of last message documented and she would like to know if another provider covering for Dr. Correia could place the order in her chart so she can try and have it done before 7/30 appointment. Please advise. Thank you.

## 2025-07-21 NOTE — TELEPHONE ENCOUNTER
FYI: Pt requested to speak with staff member Glenys in regards to a sleep study. Pt was told that staff member left for the day and Glenys will get bk to her as soon as possible.

## 2025-07-22 ENCOUNTER — TELEPHONE (OUTPATIENT)
Dept: FAMILY MEDICINE CLINIC | Facility: CLINIC | Age: 35
End: 2025-07-22

## 2025-07-22 RX ORDER — FLUCONAZOLE 150 MG/1
TABLET ORAL
Qty: 1 TABLET | Refills: 1 | Status: SHIPPED | OUTPATIENT
Start: 2025-07-22 | End: 2025-07-23

## 2025-07-22 NOTE — TELEPHONE ENCOUNTER
I spoke with Jeaneth,  she stated she did call her insurance company and they told her yes,  she does need a Prior Auth.  Temo/VOLODYMYR

## 2025-07-22 NOTE — TELEPHONE ENCOUNTER
Jeaneth called back and stated that she called [facility] and they told her they already received approval for her to have the sleep study.

## 2025-07-22 NOTE — TELEPHONE ENCOUNTER
7-20-25    Jeaneth called asking she needs a prior auth.  I told her to contact her insurance and inquire.      If they do, once received, I advised the office would submit and get back to her.    Glenys Connell [facility]   [facility]

## 2025-07-22 NOTE — TELEPHONE ENCOUNTER
Spoke with Jeaneth.  I advised that we have never done a prior authorization for sleep studies.  That is usually done by the sleep study department.    I advised for Jeaneth to call [facility] and ask them about doing the PA.

## 2025-07-25 ENCOUNTER — TELEPHONE (OUTPATIENT)
Dept: SLEEP CENTER | Facility: CLINIC | Age: 35
End: 2025-07-25

## 2025-07-25 NOTE — TELEPHONE ENCOUNTER
Referral placed for a home sleep study. Patient needs to be evaluated with a face to face consultation within past 6 months discussing symptoms listed on referral.      Please place new referral for a sleep consultation.      Ordering and co-signing providers notified.

## 2025-07-30 ENCOUNTER — OFFICE VISIT (OUTPATIENT)
Dept: FAMILY MEDICINE CLINIC | Facility: CLINIC | Age: 35
End: 2025-07-30
Payer: COMMERCIAL

## 2025-07-30 VITALS
WEIGHT: 207.8 LBS | DIASTOLIC BLOOD PRESSURE: 70 MMHG | TEMPERATURE: 97.8 F | BODY MASS INDEX: 34.62 KG/M2 | HEIGHT: 65 IN | RESPIRATION RATE: 18 BRPM | HEART RATE: 88 BPM | SYSTOLIC BLOOD PRESSURE: 128 MMHG | OXYGEN SATURATION: 98 %

## 2025-07-30 DIAGNOSIS — G89.29 CHRONIC BILATERAL THORACIC BACK PAIN: Primary | ICD-10-CM

## 2025-07-30 DIAGNOSIS — G43.909 MIGRAINE WITHOUT STATUS MIGRAINOSUS, NOT INTRACTABLE, UNSPECIFIED MIGRAINE TYPE: ICD-10-CM

## 2025-07-30 DIAGNOSIS — M54.6 CHRONIC BILATERAL THORACIC BACK PAIN: Primary | ICD-10-CM

## 2025-07-30 DIAGNOSIS — M46.1 SACROILIITIS (HCC): ICD-10-CM

## 2025-07-30 DIAGNOSIS — G47.33 OBSTRUCTIVE SLEEP APNEA SYNDROME: ICD-10-CM

## 2025-07-30 DIAGNOSIS — F41.9 ANXIETY: ICD-10-CM

## 2025-07-30 PROCEDURE — 99214 OFFICE O/P EST MOD 30 MIN: CPT | Performed by: FAMILY MEDICINE

## 2025-08-05 ENCOUNTER — TELEPHONE (OUTPATIENT)
Age: 35
End: 2025-08-05